# Patient Record
Sex: FEMALE | Race: WHITE | Employment: PART TIME | URBAN - NONMETROPOLITAN AREA
[De-identification: names, ages, dates, MRNs, and addresses within clinical notes are randomized per-mention and may not be internally consistent; named-entity substitution may affect disease eponyms.]

---

## 2019-07-13 ENCOUNTER — HOSPITAL ENCOUNTER (EMERGENCY)
Age: 39
Discharge: HOME | End: 2019-07-13
Payer: COMMERCIAL

## 2019-07-13 VITALS
DIASTOLIC BLOOD PRESSURE: 71 MMHG | TEMPERATURE: 99 F | SYSTOLIC BLOOD PRESSURE: 122 MMHG | HEART RATE: 81 BPM | RESPIRATION RATE: 18 BRPM | OXYGEN SATURATION: 97 %

## 2019-07-13 DIAGNOSIS — T07.XXXA ABRASIONS OF MULTIPLE SITES: Primary | ICD-10-CM

## 2019-07-13 PROCEDURE — 6370000000 HC RX 637 (ALT 250 FOR IP): Performed by: PHYSICIAN ASSISTANT

## 2019-07-13 PROCEDURE — 90715 TDAP VACCINE 7 YRS/> IM: CPT | Performed by: PHYSICIAN ASSISTANT

## 2019-07-13 PROCEDURE — 90471 IMMUNIZATION ADMIN: CPT | Performed by: PHYSICIAN ASSISTANT

## 2019-07-13 PROCEDURE — 99282 EMERGENCY DEPT VISIT SF MDM: CPT

## 2019-07-13 PROCEDURE — 6360000002 HC RX W HCPCS: Performed by: PHYSICIAN ASSISTANT

## 2019-07-13 RX ORDER — BACITRACIN, NEOMYCIN, POLYMYXIN B 400; 3.5; 5 [USP'U]/G; MG/G; [USP'U]/G
OINTMENT TOPICAL ONCE
Status: COMPLETED | OUTPATIENT
Start: 2019-07-13 | End: 2019-07-13

## 2019-07-13 RX ADMIN — TETANUS TOXOID, REDUCED DIPHTHERIA TOXOID AND ACELLULAR PERTUSSIS VACCINE, ADSORBED 0.5 ML: 5; 2.5; 8; 8; 2.5 SUSPENSION INTRAMUSCULAR at 18:55

## 2019-07-13 RX ADMIN — BACITRACIN, NEOMYCIN, POLYMYXIN B 1 G: 400; 3.5; 5 OINTMENT TOPICAL at 18:56

## 2019-07-13 ASSESSMENT — PAIN DESCRIPTION - ORIENTATION: ORIENTATION: RIGHT;LEFT

## 2019-07-13 ASSESSMENT — PAIN DESCRIPTION - PAIN TYPE: TYPE: ACUTE PAIN

## 2019-07-13 ASSESSMENT — PAIN SCALES - GENERAL: PAINLEVEL_OUTOF10: 2

## 2019-07-13 ASSESSMENT — PAIN DESCRIPTION - LOCATION: LOCATION: ARM

## 2019-07-13 ASSESSMENT — PAIN DESCRIPTION - DESCRIPTORS: DESCRIPTORS: BURNING;DISCOMFORT

## 2019-07-13 ASSESSMENT — PAIN DESCRIPTION - FREQUENCY: FREQUENCY: CONTINUOUS

## 2019-07-13 NOTE — ED NOTES
This writer had patient wash scratches with soap and water at the sink.   Pt states that she did not wash them earlier as this happened around noon     Maddy Will, [address] [facility]  07/13/19 7348

## 2019-07-27 ENCOUNTER — HOSPITAL ENCOUNTER (EMERGENCY)
Age: 39
Discharge: HOME | End: 2019-07-27
Payer: COMMERCIAL

## 2019-07-27 ENCOUNTER — APPOINTMENT (OUTPATIENT)
Dept: GENERAL RADIOLOGY | Age: 39
End: 2019-07-27
Payer: COMMERCIAL

## 2019-07-27 VITALS
WEIGHT: 215 LBS | TEMPERATURE: 98.2 F | SYSTOLIC BLOOD PRESSURE: 115 MMHG | DIASTOLIC BLOOD PRESSURE: 75 MMHG | HEART RATE: 62 BPM | BODY MASS INDEX: 33.74 KG/M2 | HEIGHT: 67 IN | RESPIRATION RATE: 20 BRPM | OXYGEN SATURATION: 100 %

## 2019-07-27 DIAGNOSIS — S63.641A SPRAIN OF METACARPOPHALANGEAL (MCP) JOINT OF RIGHT THUMB, INITIAL ENCOUNTER: Primary | ICD-10-CM

## 2019-07-27 PROCEDURE — 73140 X-RAY EXAM OF FINGER(S): CPT

## 2019-07-27 PROCEDURE — 99283 EMERGENCY DEPT VISIT LOW MDM: CPT

## 2019-07-27 RX ORDER — NAPROXEN 500 MG/1
500 TABLET ORAL 2 TIMES DAILY
Qty: 14 TABLET | Refills: 0 | Status: SHIPPED | OUTPATIENT
Start: 2019-07-27 | End: 2020-06-04

## 2019-07-27 SDOH — HEALTH STABILITY: MENTAL HEALTH: HOW OFTEN DO YOU HAVE A DRINK CONTAINING ALCOHOL?: NEVER

## 2019-07-27 ASSESSMENT — PAIN SCALES - GENERAL: PAINLEVEL_OUTOF10: 1

## 2019-07-27 ASSESSMENT — PAIN DESCRIPTION - PAIN TYPE: TYPE: ACUTE PAIN

## 2019-07-27 NOTE — ED PROVIDER NOTES
[facility] ED  EMERGENCY DEPARTMENT ENCOUNTER      Pt Name: Blanca Cortes  MRN: [id number]  [place] 1980  Date of evaluation: 7/27/2019  Provider: Amarjit Lombardi PA-C    CHIEF COMPLAINT       Chief Complaint   Patient presents with    Hand Injury     Right thumb injury at work today. HISTORY OF PRESENT ILLNESS    Blanca Cortes is a 35 y.o. female who presents to the emergency department from home planes of pain in her right thumb after injury at work today. 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 Denies distal or tingling. Triage notes and Nursing notes were reviewed by myself. Any discrepancies are addressed above. PAST MEDICAL HISTORY     Past Medical History:   Diagnosis Date    Acute relapsing multiple sclerosis ([facility] [address]        SURGICAL HISTORY     History reviewed. No pertinent surgical history. CURRENT MEDICATIONS       Discharge Medication List as of 7/27/2019  4:24 PM          ALLERGIES     Patient has no known allergies. FAMILY HISTORY     History reviewed. No pertinent family history.      SOCIAL HISTORY       Social History     Socioeconomic History    Marital status:      Spouse name: None    Number of children: None    Years of education: None    Highest education level: None   Occupational History    None   Social Needs    Financial resource strain: None    Food insecurity:     Worry: None     Inability: None    Transportation needs:     Medical: None     Non-medical: None   Tobacco Use    Smoking status: Never Smoker    Smokeless tobacco: Never Used   Substance and Sexual Activity    Alcohol use: Not Currently     Frequency: Never    Drug use: None    Sexual activity: None   Lifestyle    Physical activity:     Days per week: None     Minutes per session: None    Stress: None

## 2019-10-25 ENCOUNTER — HOSPITAL ENCOUNTER (OUTPATIENT)
Dept: OCCUPATIONAL THERAPY | Age: 39
Setting detail: THERAPIES SERIES
Discharge: HOME | End: 2019-10-25
Payer: COMMERCIAL

## 2019-10-25 PROCEDURE — 97165 OT EVAL LOW COMPLEX 30 MIN: CPT

## 2019-10-25 PROCEDURE — 97018 PARAFFIN BATH THERAPY: CPT

## 2019-10-25 PROCEDURE — G0283 ELEC STIM OTHER THAN WOUND: HCPCS

## 2019-10-25 PROCEDURE — 97110 THERAPEUTIC EXERCISES: CPT

## 2019-10-28 ENCOUNTER — HOSPITAL ENCOUNTER (OUTPATIENT)
Dept: OCCUPATIONAL THERAPY | Age: 39
Setting detail: THERAPIES SERIES
Discharge: HOME | End: 2019-10-28
Payer: COMMERCIAL

## 2019-10-28 PROCEDURE — 97035 APP MDLTY 1+ULTRASOUND EA 15: CPT

## 2019-10-28 PROCEDURE — 97140 MANUAL THERAPY 1/> REGIONS: CPT

## 2019-10-28 PROCEDURE — 97018 PARAFFIN BATH THERAPY: CPT

## 2019-10-30 ENCOUNTER — HOSPITAL ENCOUNTER (OUTPATIENT)
Dept: OCCUPATIONAL THERAPY | Age: 39
Setting detail: THERAPIES SERIES
Discharge: HOME | End: 2019-10-30
Payer: COMMERCIAL

## 2019-10-30 PROCEDURE — 97140 MANUAL THERAPY 1/> REGIONS: CPT

## 2019-10-30 PROCEDURE — 97018 PARAFFIN BATH THERAPY: CPT

## 2019-10-30 PROCEDURE — 97032 APPL MODALITY 1+ESTIM EA 15: CPT

## 2019-11-04 ENCOUNTER — HOSPITAL ENCOUNTER (OUTPATIENT)
Dept: OCCUPATIONAL THERAPY | Age: 39
Setting detail: THERAPIES SERIES
Discharge: HOME | End: 2019-11-04
Payer: COMMERCIAL

## 2019-11-04 PROCEDURE — 97035 APP MDLTY 1+ULTRASOUND EA 15: CPT

## 2019-11-04 PROCEDURE — 97018 PARAFFIN BATH THERAPY: CPT

## 2019-11-04 PROCEDURE — 97140 MANUAL THERAPY 1/> REGIONS: CPT

## 2019-11-06 ENCOUNTER — HOSPITAL ENCOUNTER (OUTPATIENT)
Dept: OCCUPATIONAL THERAPY | Age: 39
Setting detail: THERAPIES SERIES
Discharge: HOME | End: 2019-11-06
Payer: COMMERCIAL

## 2019-11-06 PROCEDURE — 97035 APP MDLTY 1+ULTRASOUND EA 15: CPT

## 2019-11-06 PROCEDURE — 97018 PARAFFIN BATH THERAPY: CPT

## 2019-11-06 PROCEDURE — 97110 THERAPEUTIC EXERCISES: CPT

## 2019-11-13 ENCOUNTER — HOSPITAL ENCOUNTER (OUTPATIENT)
Dept: OCCUPATIONAL THERAPY | Age: 39
Setting detail: THERAPIES SERIES
Discharge: HOME | End: 2019-11-13
Payer: COMMERCIAL

## 2019-11-15 ENCOUNTER — APPOINTMENT (OUTPATIENT)
Dept: OCCUPATIONAL THERAPY | Age: 39
End: 2019-11-15
Payer: COMMERCIAL

## 2019-11-18 ENCOUNTER — APPOINTMENT (OUTPATIENT)
Dept: OCCUPATIONAL THERAPY | Age: 39
End: 2019-11-18
Payer: COMMERCIAL

## 2019-11-20 ENCOUNTER — APPOINTMENT (OUTPATIENT)
Dept: OCCUPATIONAL THERAPY | Age: 39
End: 2019-11-20
Payer: COMMERCIAL

## 2019-12-13 ENCOUNTER — OFFICE VISIT (OUTPATIENT)
Dept: NEUROLOGY | Age: 39
End: 2019-12-13
Payer: COMMERCIAL

## 2019-12-13 VITALS
HEIGHT: 67 IN | BODY MASS INDEX: 35.09 KG/M2 | HEART RATE: 96 BPM | DIASTOLIC BLOOD PRESSURE: 80 MMHG | WEIGHT: 223.6 LBS | SYSTOLIC BLOOD PRESSURE: 116 MMHG

## 2019-12-13 DIAGNOSIS — H53.9 VISUAL DISTURBANCE: ICD-10-CM

## 2019-12-13 DIAGNOSIS — R20.0 RIGHT SIDED NUMBNESS: ICD-10-CM

## 2019-12-13 DIAGNOSIS — G35 MULTIPLE SCLEROSIS (HCC): Primary | ICD-10-CM

## 2019-12-13 PROCEDURE — G8484 FLU IMMUNIZE NO ADMIN: HCPCS | Performed by: PSYCHIATRY & NEUROLOGY

## 2019-12-13 PROCEDURE — G8427 DOCREV CUR MEDS BY ELIG CLIN: HCPCS | Performed by: PSYCHIATRY & NEUROLOGY

## 2019-12-13 PROCEDURE — G8417 CALC BMI ABV UP PARAM F/U: HCPCS | Performed by: PSYCHIATRY & NEUROLOGY

## 2019-12-13 PROCEDURE — 99204 OFFICE O/P NEW MOD 45 MIN: CPT | Performed by: PSYCHIATRY & NEUROLOGY

## 2019-12-13 PROCEDURE — 1036F TOBACCO NON-USER: CPT | Performed by: PSYCHIATRY & NEUROLOGY

## 2019-12-13 RX ORDER — PREDNISONE 20 MG/1
TABLET ORAL
Qty: 18 TABLET | Refills: 0 | Status: SHIPPED | OUTPATIENT
Start: 2019-12-13 | End: 2019-12-23

## 2019-12-13 RX ORDER — METHYLPREDNISOLONE SODIUM SUCCINATE 500 MG/8ML
INJECTION INTRAMUSCULAR; INTRAVENOUS
Qty: 1000 MG | Refills: 0 | Status: SHIPPED | OUTPATIENT
Start: 2019-12-13 | End: 2020-06-04

## 2019-12-13 ASSESSMENT — ENCOUNTER SYMPTOMS
GASTROINTESTINAL NEGATIVE: 1
ALLERGIC/IMMUNOLOGIC NEGATIVE: 1
EYES NEGATIVE: 1

## 2019-12-16 ENCOUNTER — HOSPITAL ENCOUNTER (OUTPATIENT)
Dept: INFUSION THERAPY | Age: 39
End: 2019-12-16
Payer: COMMERCIAL

## 2019-12-16 ENCOUNTER — TELEPHONE (OUTPATIENT)
Dept: NEUROLOGY | Age: 39
End: 2019-12-16

## 2019-12-16 DIAGNOSIS — G35 MULTIPLE SCLEROSIS (HCC): ICD-10-CM

## 2019-12-16 RX ORDER — SODIUM CHLORIDE 0.9 % (FLUSH) 0.9 %
10 SYRINGE (ML) INJECTION PRN
Status: CANCELLED | OUTPATIENT
Start: 2019-12-16

## 2019-12-16 RX ORDER — SODIUM CHLORIDE 0.9 % (FLUSH) 0.9 %
5 SYRINGE (ML) INJECTION PRN
Status: CANCELLED | OUTPATIENT
Start: 2019-12-16

## 2019-12-16 RX ORDER — SODIUM CHLORIDE 9 MG/ML
INJECTION, SOLUTION INTRAVENOUS CONTINUOUS
Status: CANCELLED
Start: 2019-12-16

## 2019-12-17 ENCOUNTER — TELEPHONE (OUTPATIENT)
Dept: NEUROLOGY | Age: 39
End: 2019-12-17

## 2019-12-17 ENCOUNTER — HOSPITAL ENCOUNTER (OUTPATIENT)
Dept: INFUSION THERAPY | Age: 39
End: 2019-12-17

## 2019-12-18 ENCOUNTER — HOSPITAL ENCOUNTER (OUTPATIENT)
Dept: INFUSION THERAPY | Age: 39
Discharge: HOME | End: 2019-12-18
Payer: COMMERCIAL

## 2019-12-18 VITALS
SYSTOLIC BLOOD PRESSURE: 109 MMHG | TEMPERATURE: 97.2 F | HEART RATE: 73 BPM | RESPIRATION RATE: 18 BRPM | DIASTOLIC BLOOD PRESSURE: 70 MMHG

## 2019-12-18 DIAGNOSIS — G35 MULTIPLE SCLEROSIS (HCC): Primary | ICD-10-CM

## 2019-12-18 PROCEDURE — 96365 THER/PROPH/DIAG IV INF INIT: CPT

## 2019-12-18 PROCEDURE — 2580000003 HC RX 258: Performed by: PSYCHIATRY & NEUROLOGY

## 2019-12-18 PROCEDURE — 6360000002 HC RX W HCPCS: Performed by: PSYCHIATRY & NEUROLOGY

## 2019-12-18 RX ORDER — SODIUM CHLORIDE 9 MG/ML
INJECTION, SOLUTION INTRAVENOUS CONTINUOUS
Status: DISCONTINUED | OUTPATIENT
Start: 2019-12-18 | End: 2019-12-19 | Stop reason: HOSPADM

## 2019-12-18 RX ORDER — SODIUM CHLORIDE 9 MG/ML
INJECTION, SOLUTION INTRAVENOUS CONTINUOUS
Status: CANCELLED
Start: 2019-12-19

## 2019-12-18 RX ORDER — SODIUM CHLORIDE 0.9 % (FLUSH) 0.9 %
10 SYRINGE (ML) INJECTION PRN
Status: DISCONTINUED | OUTPATIENT
Start: 2019-12-18 | End: 2019-12-19 | Stop reason: HOSPADM

## 2019-12-18 RX ORDER — SODIUM CHLORIDE 0.9 % (FLUSH) 0.9 %
5 SYRINGE (ML) INJECTION PRN
Status: CANCELLED | OUTPATIENT
Start: 2019-12-19

## 2019-12-18 RX ORDER — SODIUM CHLORIDE 0.9 % (FLUSH) 0.9 %
10 SYRINGE (ML) INJECTION PRN
Status: CANCELLED | OUTPATIENT
Start: 2019-12-19

## 2019-12-18 RX ORDER — ACETAMINOPHEN 500 MG
1000 TABLET ORAL EVERY 6 HOURS PRN
COMMUNITY

## 2019-12-18 RX ADMIN — Medication 10 ML: at 14:05

## 2019-12-18 RX ADMIN — Medication 10 ML: at 12:45

## 2019-12-18 RX ADMIN — METHYLPREDNISOLONE SODIUM SUCCINATE 1000 MG: 1 INJECTION, POWDER, LYOPHILIZED, FOR SOLUTION INTRAMUSCULAR; INTRAVENOUS at 12:47

## 2019-12-18 RX ADMIN — SODIUM CHLORIDE: 9 INJECTION, SOLUTION INTRAVENOUS at 12:45

## 2019-12-18 ASSESSMENT — PAIN DESCRIPTION - DESCRIPTORS: DESCRIPTORS: ACHING

## 2019-12-18 ASSESSMENT — PAIN SCALES - GENERAL: PAINLEVEL_OUTOF10: 3

## 2019-12-18 ASSESSMENT — PAIN DESCRIPTION - LOCATION: LOCATION: HEAD

## 2019-12-19 ENCOUNTER — HOSPITAL ENCOUNTER (OUTPATIENT)
Dept: INFUSION THERAPY | Age: 39
Discharge: HOME | End: 2019-12-19
Payer: COMMERCIAL

## 2019-12-19 VITALS
HEART RATE: 71 BPM | TEMPERATURE: 98.9 F | DIASTOLIC BLOOD PRESSURE: 60 MMHG | RESPIRATION RATE: 16 BRPM | SYSTOLIC BLOOD PRESSURE: 104 MMHG

## 2019-12-19 DIAGNOSIS — G35 MULTIPLE SCLEROSIS (HCC): Primary | ICD-10-CM

## 2019-12-19 PROCEDURE — 96365 THER/PROPH/DIAG IV INF INIT: CPT

## 2019-12-19 PROCEDURE — 2580000003 HC RX 258: Performed by: PSYCHIATRY & NEUROLOGY

## 2019-12-19 PROCEDURE — 6360000002 HC RX W HCPCS: Performed by: PSYCHIATRY & NEUROLOGY

## 2019-12-19 RX ORDER — SODIUM CHLORIDE 9 MG/ML
INJECTION, SOLUTION INTRAVENOUS CONTINUOUS
Status: DISCONTINUED | OUTPATIENT
Start: 2019-12-19 | End: 2019-12-20 | Stop reason: HOSPADM

## 2019-12-19 RX ORDER — SODIUM CHLORIDE 0.9 % (FLUSH) 0.9 %
10 SYRINGE (ML) INJECTION PRN
Status: CANCELLED | OUTPATIENT
Start: 2019-12-20

## 2019-12-19 RX ORDER — SODIUM CHLORIDE 9 MG/ML
INJECTION, SOLUTION INTRAVENOUS CONTINUOUS
Status: CANCELLED
Start: 2019-12-20

## 2019-12-19 RX ORDER — SODIUM CHLORIDE 0.9 % (FLUSH) 0.9 %
5 SYRINGE (ML) INJECTION PRN
Status: CANCELLED | OUTPATIENT
Start: 2019-12-20

## 2019-12-19 RX ORDER — SODIUM CHLORIDE 0.9 % (FLUSH) 0.9 %
10 SYRINGE (ML) INJECTION PRN
Status: DISCONTINUED | OUTPATIENT
Start: 2019-12-19 | End: 2019-12-20 | Stop reason: HOSPADM

## 2019-12-19 RX ADMIN — Medication 10 ML: at 14:12

## 2019-12-19 RX ADMIN — SODIUM CHLORIDE 1000 MG: 9 INJECTION, SOLUTION INTRAVENOUS at 12:57

## 2019-12-19 RX ADMIN — SODIUM CHLORIDE: 9 INJECTION, SOLUTION INTRAVENOUS at 12:53

## 2019-12-19 RX ADMIN — Medication 10 ML: at 12:35

## 2019-12-19 NOTE — PROGRESS NOTES
1410 Verbally reviewed discharge instructions for care and follow up. Previous print out of these instructions were given with prior treatment. Patient verbalized understanding of these instructions.

## 2019-12-20 ENCOUNTER — HOSPITAL ENCOUNTER (OUTPATIENT)
Dept: INFUSION THERAPY | Age: 39
Discharge: HOME | End: 2019-12-20
Payer: COMMERCIAL

## 2019-12-20 VITALS
HEART RATE: 60 BPM | SYSTOLIC BLOOD PRESSURE: 126 MMHG | RESPIRATION RATE: 20 BRPM | DIASTOLIC BLOOD PRESSURE: 80 MMHG | TEMPERATURE: 97.9 F

## 2019-12-20 DIAGNOSIS — G35 MULTIPLE SCLEROSIS (HCC): Primary | ICD-10-CM

## 2019-12-20 PROCEDURE — 6360000002 HC RX W HCPCS: Performed by: PSYCHIATRY & NEUROLOGY

## 2019-12-20 PROCEDURE — 96365 THER/PROPH/DIAG IV INF INIT: CPT

## 2019-12-20 PROCEDURE — 2580000003 HC RX 258: Performed by: PSYCHIATRY & NEUROLOGY

## 2019-12-20 RX ORDER — SODIUM CHLORIDE 9 MG/ML
INJECTION, SOLUTION INTRAVENOUS CONTINUOUS
Status: CANCELLED
Start: 2019-12-20

## 2019-12-20 RX ORDER — SODIUM CHLORIDE 0.9 % (FLUSH) 0.9 %
10 SYRINGE (ML) INJECTION PRN
Status: DISCONTINUED | OUTPATIENT
Start: 2019-12-20 | End: 2019-12-21 | Stop reason: HOSPADM

## 2019-12-20 RX ORDER — SODIUM CHLORIDE 9 MG/ML
INJECTION, SOLUTION INTRAVENOUS CONTINUOUS
Status: DISCONTINUED | OUTPATIENT
Start: 2019-12-20 | End: 2019-12-21 | Stop reason: HOSPADM

## 2019-12-20 RX ORDER — SODIUM CHLORIDE 0.9 % (FLUSH) 0.9 %
5 SYRINGE (ML) INJECTION PRN
Status: CANCELLED | OUTPATIENT
Start: 2019-12-20

## 2019-12-20 RX ORDER — SODIUM CHLORIDE 0.9 % (FLUSH) 0.9 %
10 SYRINGE (ML) INJECTION PRN
Status: CANCELLED | OUTPATIENT
Start: 2019-12-20

## 2019-12-20 RX ADMIN — SODIUM CHLORIDE: 9 INJECTION, SOLUTION INTRAVENOUS at 12:20

## 2019-12-20 RX ADMIN — Medication 10 ML: at 12:18

## 2019-12-20 RX ADMIN — SODIUM CHLORIDE 1000 MG: 9 INJECTION, SOLUTION INTRAVENOUS at 12:37

## 2020-01-24 ENCOUNTER — OFFICE VISIT (OUTPATIENT)
Dept: NEUROLOGY | Age: 40
End: 2020-01-24
Payer: COMMERCIAL

## 2020-01-24 VITALS
DIASTOLIC BLOOD PRESSURE: 92 MMHG | BODY MASS INDEX: 35.31 KG/M2 | HEART RATE: 104 BPM | WEIGHT: 225 LBS | HEIGHT: 67 IN | SYSTOLIC BLOOD PRESSURE: 128 MMHG

## 2020-01-24 PROCEDURE — G8417 CALC BMI ABV UP PARAM F/U: HCPCS | Performed by: PSYCHIATRY & NEUROLOGY

## 2020-01-24 PROCEDURE — 1036F TOBACCO NON-USER: CPT | Performed by: PSYCHIATRY & NEUROLOGY

## 2020-01-24 PROCEDURE — 99214 OFFICE O/P EST MOD 30 MIN: CPT | Performed by: PSYCHIATRY & NEUROLOGY

## 2020-01-24 PROCEDURE — G8484 FLU IMMUNIZE NO ADMIN: HCPCS | Performed by: PSYCHIATRY & NEUROLOGY

## 2020-01-24 PROCEDURE — G8427 DOCREV CUR MEDS BY ELIG CLIN: HCPCS | Performed by: PSYCHIATRY & NEUROLOGY

## 2020-01-24 RX ORDER — IBUPROFEN 200 MG
200 TABLET ORAL EVERY 6 HOURS PRN
COMMUNITY

## 2020-01-24 ASSESSMENT — ENCOUNTER SYMPTOMS
ALLERGIC/IMMUNOLOGIC NEGATIVE: 1
GASTROINTESTINAL NEGATIVE: 1
EYES NEGATIVE: 1
BACK PAIN: 1

## 2020-01-24 NOTE — PROGRESS NOTES
Spouse name: None    Number of children: None    Years of education: None    Highest education level: None   Occupational History    None   Social Needs    Financial resource strain: None    Food insecurity:     Worry: None     Inability: None    Transportation needs:     Medical: None     Non-medical: None   Tobacco Use    Smoking status: Former Smoker     Years: 15.00     Types: Cigarettes     Last attempt to quit: 2011     Years since quittin.1    Smokeless tobacco: Never Used   Substance and Sexual Activity    Alcohol use: Yes     Frequency: Never     Comment: occasionally    Drug use: Never    Sexual activity: None   Lifestyle    Physical activity:     Days per week: None     Minutes per session: None    Stress: None   Relationships    Social connections:     Talks on phone: None     Gets together: None     Attends Spiritism service: None     Active member of club or organization: None     Attends meetings of clubs or organizations: None     Relationship status: None    Intimate partner violence:     Fear of current or ex partner: None     Emotionally abused: None     Physically abused: None     Forced sexual activity: None   Other Topics Concern    None   Social History Narrative    None       Current Outpatient Medications   Medication Sig Dispense Refill    ibuprofen (ADVIL;MOTRIN) 200 MG tablet Take 200 mg by mouth every 6 hours as needed for Pain      acetaminophen (TYLENOL) 500 MG tablet Take 1,000 mg by mouth every 6 hours as needed for Pain      VITAMIN D PO Take 10,000 Units by mouth daily      Meloxicam (MOBIC PO) Take 25 mg by mouth daily      methylPREDNISolone sodium (SOLU-MEDROL) 500 MG injection 1 gram IVPB qd x 3 days (Patient not taking: Reported on 2020) 1000 mg 0    naproxen (NAPROSYN) 500 MG tablet Take 1 tablet by mouth 2 times daily (Patient not taking: Reported on 2019) 14 tablet 0     No current facility-administered medications for this Orders   1. Multiple sclerosis ([facility] [address]  MRI Brain W WO Contrast    MRI Cervical Spine W WO Contrast    Vitamin B12    Sedimentation rate, automated    Folate    TSH without Reflex    MASON Preston MD, Neurology, [place]   2. Right sided numbness  MRI Brain W WO Contrast    MRI Cervical Spine W WO Contrast   3. Visual disturbance  MRI Brain W WO Contrast    MRI Cervical Spine W WO Contrast   Patient has had improvement of right side numbness with IV solumedrol therapy.  Will make referral to [address] [facility] run by Dr Joanna Mcghee to undergo MRI of Head and cervical spine and bloodwork        Plan:      Orders Placed This Encounter   Procedures    MRI Brain W WO Contrast     Standing Status:   Future     Standing Expiration Date:   1/23/2021    MRI Cervical Spine W WO Contrast     Standing Status:   Future     Standing Expiration Date:   1/23/2021    Vitamin B12     Standing Status:   Future     Standing Expiration Date:   1/23/2021    Sedimentation rate, automated     Standing Status:   Future     Standing Expiration Date:   1/23/2021    Folate     Standing Status:   Future     Standing Expiration Date:   1/23/2021    TSH without Reflex     Standing Status:   Future     Standing Expiration Date:   1/23/2021    MASON     Standing Status:   Future     Standing Expiration Date:   1/23/2021   Neena Preston MD, Neurology, [place]     Referral Priority:   Routine     Referral Type:   Eval and Treat     Referral Reason:   Specialty Services Required     Referred to Provider:   Enid Roca MD     Requested Specialty:   Neurology     Number of Visits Requested:   1           Ruddy Dahl MD

## 2020-01-26 NOTE — COMMUNICATION BODY
Subjective:      Patient ID: Suzie Sams is a 38 y.o. female. HPI    Active problem  multiple sclerosis on no immunomodulating medication with excacerbation with left eye blurriness and right hemisensory loss to undergo MRI of Head and cervical spine , bloodwork along with proceed with IV solumedrol pulse therapy followed by prednisone taper . The condition is she reports there has been improvement of blurriness of left eye with mild residual . There has been also improvement in right side numbness with no more right arm numbness although has residual numbness from right trunk down right leg . There is no weakness or imbalance or bladder complaints . She did not have MRI studies or bloodwork . Patient has multiple sclerosis for the past 21 years recently moving to [place] from [facility] . MS presented with right hemisensory loss with mild weakness and dizziness diagnosed by MRI of not having solumedrol pulse therapy started on betaseron. She reports several exacerbations over 6 months manifesting as numbness staying on betaseron for 2 years until she became pregnant . 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 She was placed on avonex unable to tolerate do to flu symptoms changed to copaxone taking this for 3 to 4 months stopping this do to loss of insurance . She reports at that time 3 to 4 episodes of numbness at that time lasting from 1 to 4 weeks . She has been off since then immunodulating medication . Past Medical History:   Diagnosis Date    Acute relapsing multiple sclerosis ([facility] [address]        Past Surgical History:   Procedure Laterality Date     SECTION      times 3    KNEE SURGERY Left     cyst removal       History reviewed. No pertinent family history.     Social History     Socioeconomic History    Marital status:

## 2020-01-30 ENCOUNTER — HOSPITAL ENCOUNTER (OUTPATIENT)
Dept: MRI IMAGING | Age: 40
Discharge: HOME | End: 2020-02-01
Payer: COMMERCIAL

## 2020-01-30 ENCOUNTER — HOSPITAL ENCOUNTER (OUTPATIENT)
Dept: LAB | Age: 40
Discharge: HOME | End: 2020-01-30
Payer: COMMERCIAL

## 2020-01-30 LAB
SEDIMENTATION RATE, ERYTHROCYTE: 12 MM (ref 0–20)
TSH SERPL DL<=0.05 MIU/L-ACNC: 1.12 MIU/L (ref 0.3–5)

## 2020-01-30 PROCEDURE — 82746 ASSAY OF FOLIC ACID SERUM: CPT

## 2020-01-30 PROCEDURE — 36415 COLL VENOUS BLD VENIPUNCTURE: CPT

## 2020-01-30 PROCEDURE — A9579 GAD-BASE MR CONTRAST NOS,1ML: HCPCS | Performed by: PSYCHIATRY & NEUROLOGY

## 2020-01-30 PROCEDURE — 70553 MRI BRAIN STEM W/O & W/DYE: CPT

## 2020-01-30 PROCEDURE — 6360000004 HC RX CONTRAST MEDICATION: Performed by: PSYCHIATRY & NEUROLOGY

## 2020-01-30 PROCEDURE — 84443 ASSAY THYROID STIM HORMONE: CPT

## 2020-01-30 PROCEDURE — 86038 ANTINUCLEAR ANTIBODIES: CPT

## 2020-01-30 PROCEDURE — 85651 RBC SED RATE NONAUTOMATED: CPT

## 2020-01-30 PROCEDURE — 82607 VITAMIN B-12: CPT

## 2020-01-30 PROCEDURE — 72156 MRI NECK SPINE W/O & W/DYE: CPT

## 2020-01-30 RX ADMIN — GADOTERIDOL 19 ML: 279.3 INJECTION, SOLUTION INTRAVENOUS at 15:02

## 2020-01-31 LAB
ANTI-NUCLEAR ANTIBODY (ANA): NEGATIVE
FOLATE: 11 NG/ML
VITAMIN B-12: 625 PG/ML (ref 232–1245)

## 2020-02-10 NOTE — TELEPHONE ENCOUNTER
Kong Snyder called the office and left a message on my voicemail. She states that she has had nonstop headaches for the past few months and she is wondering if the MRI's showed anything. This was discussed with Dr. Stephania Basilio. He asked that Karissa Welsh know that her MRI brain looked the same, showing no difference. MRI cervical spine did show some arthritic changes but he felt this is of no consequence. Lab work looks good. He is suggesting a prednisone taper as well as starting on Amitriptyline 25 mg q hs. Call placed to Kong Snyder this morning and this information was given. Patient is agreeable to try medication. Pharmacy was confirmed. I asked that she call if there were any problems.

## 2020-02-11 RX ORDER — AMITRIPTYLINE HYDROCHLORIDE 25 MG/1
25 TABLET, FILM COATED ORAL NIGHTLY
Qty: 30 TABLET | Refills: 1 | Status: SHIPPED | OUTPATIENT
Start: 2020-02-11 | End: 2020-03-31

## 2020-02-11 RX ORDER — PREDNISONE 20 MG/1
TABLET ORAL
Qty: 18 TABLET | Refills: 0 | Status: SHIPPED | OUTPATIENT
Start: 2020-02-11 | End: 2020-06-04

## 2020-03-30 RX ORDER — CETIRIZINE HYDROCHLORIDE 10 MG/1
10 TABLET ORAL DAILY
COMMUNITY
End: 2020-08-06

## 2020-03-30 RX ORDER — BENZONATATE 100 MG/1
100 CAPSULE ORAL 3 TIMES DAILY PRN
COMMUNITY
End: 2020-06-04

## 2020-03-30 NOTE — PROGRESS NOTES
[facility]  Offices: [address], [facility], [address] [facility]  [address], [address], [address] [facility]  [address] [place] [address] [place], [address]  Phone: [phone]  Fax: [phone]    MD Jose Stafford MD Ahmed B. Ottie Oyster, MD Norvell Finely, MD Marsa Bushman, MD Harrison Roots, CNP    TELEHEALTH VISIT        3/30/2020      HISTORY OF PRESENT ILLNESS:       I had the pleasure of seeing Kevin Noyola, who returns for continuing neurologic care for relapsing remitting multiple sclerosis. This patient was previously seen by Dr. Radha Ocasio, I reviewed his prior notes in detail. DISEASE SUMMARY  Date of onset: 1998 (R sensory loss and dizziness)  Date of diagnosis of MS: 1998  Disease course at onset: Relapsing-Remitting  Current disease course: Relapsing-Remitting  Previous disease therapies: IVMP, Betaseron ( [id number], pregnancy), Avonex ( 2009, took for 3-4 mos, flu-like symptoms)  Current disease therapy: none  CSF: N/A  JCV serology result and date:  Vitamin D:  Smoking status:    Patient reports she was diagnosed in [address] [place] [address] after presenting with R hemisensory loss. She underwent an MRI which reportedly confimed a diagnosis of MS, did not have an LP. She was started on Betaseron but stopped it after getting pregnant. She was symptom free until 2009, and was not on any DMT until she experienced complete loss of vision on the R eye. She was diagnosed with ON, and treated with IV steroids, with partial resolution of symptoms. She was then started on Avonex but only took it for 3-4 mos due to severe flu-like symptoms. She never resumed any DMT and was relapse free until Dec 2019 when she experienced blurring of vision in both eyes and recurrence of R sided numbness in her leg. She then saw Dr. Radha Ocasoi, MRI brain and C spine showed nurmerous supratentorial white matter lesions with T1 black holes, no enhancement.  No prior imaging symmetry                                                             VIII - intact hearing                                                                             IX, X - symmetrical palate                                                                  XI - symmetrical shoulder shrug                                                       XII - tongue midline without atrophy      Motor function  Normal muscle bulk. Strength at least 4+/5 on all 4 extremities, no pronator drift      Sensory function Unable to test      Cerebellar Intact fine motor movement. No involuntary movements or tremors. No ataxia or dysmetria on finger to nose or heel to shin testing      Reflex function Unable to test      Gait                   normal base and arm swing              ASSESSMENT AND PLAN:       This is a 38 y.o. female who comes in for follow up for a history of RRMS that was diagnosed in 1998. She was on platforms therapies in the past, has not been on any DMT since 2009. She presented in December with bilateral BOV and R leg numbness, recent MRI brain and C spine showed no enhancement, no prior imaging available for comparison (patient recently moved her from [place]). 1. Discussed risks and benefit of restarting DMT with the patient. I think one of the orals would be a good fit, possibly Mayzent or Vumerity. Will mail out patient info packets for these, as well as AUbagio, for patient to review  2. Will check CBC, CMP, HIV, vit D, etc and other baseline screening labs today  3. She will also need an MRI of the T spine at baseline  4. Will stop amitriptyline as it has not helped with her headaches, and made her fatigue worse  5. Follow up in 2 weeks    At least 25 minutes was spent in patient care, care coordination, counseling and review of records for this patient. This is a telehealth visit that was performed with the originating site at Patient Location: home and Provider Location of [place], [place].  Verbal consent to participate in video visit was obtained. Pursuant to the emergency declaration under the [facility]note 2[facility], [facility] waiver authority and the [employer] Act, this Virtual Visit was conducted, with patient's consent, to reduce the patient's risk of exposure to COVID-19 and provide continuity of care for an established/new patient. Services were provided through a video synchronous discussion virtually to substitute for in-person clinic visit. I discussed with the patient the nature of our telehealth visits via interactive/real-time audio/video that:  - I would evaluate the patient and recommend diagnostics and treatments based on my assessment  - Our sessions are not being recorded and that personal health information is protected  - Our team would provide follow up care in person if/when the patient needs it. Marcos Currie MD  Neurology and Sleep Medicine  [facility].    Please note that this chart was generated using voice recognition Dragon dictation software. Although every effort was made to ensure the accuracy of this automated transcription, some errors in transcription may have occurred.

## 2020-03-31 ENCOUNTER — TELEMEDICINE (OUTPATIENT)
Dept: NEUROLOGY | Age: 40
End: 2020-03-31
Payer: COMMERCIAL

## 2020-03-31 PROCEDURE — G8427 DOCREV CUR MEDS BY ELIG CLIN: HCPCS | Performed by: PSYCHIATRY & NEUROLOGY

## 2020-03-31 PROCEDURE — 99214 OFFICE O/P EST MOD 30 MIN: CPT | Performed by: PSYCHIATRY & NEUROLOGY

## 2020-04-07 ENCOUNTER — TELEPHONE (OUTPATIENT)
Dept: NEUROLOGY | Age: 40
End: 2020-04-07

## 2020-04-22 RX ORDER — AZELASTINE 1 MG/ML
SPRAY, METERED NASAL
COMMUNITY
Start: 2020-03-31 | End: 2020-06-04

## 2020-04-22 RX ORDER — AMITRIPTYLINE HYDROCHLORIDE 10 MG/1
TABLET, FILM COATED ORAL
COMMUNITY
Start: 2020-03-06 | End: 2020-08-06

## 2020-04-22 NOTE — PROGRESS NOTES
on 1/24/2020) 1000 mg 0    naproxen (NAPROSYN) 500 MG tablet Take 1 tablet by mouth 2 times daily (Patient not taking: Reported on 3/30/2020) 14 tablet 0     No current facility-administered medications for this visit. ALLERGIES:   No Known Allergies                          REVIEW OF SYSTEMS  10 point Review of Systems was performed and was negative other than for those mentioned above. PHYSICAL EXAMINATION:                                         .                                                                                                    General Appearance:  Alert, cooperative, no signs of distress, appears stated age   Head:  Normocephalic, no signs of trauma   Eyes:  Conjunctiva/corneas clear;  eyelids intact   Ears:  Normal external ear and canals   Nose: Nares normal, no drainage    Throat: Lips and tongue normal; teeth normal;  gums normal   Extremities: Extremities normal, no cyanosis, no edema   Skin: Skin color, texture normal, no rashes, no lesions                                     NEUROLOGIC EXAMINATION      Mental status    Alert and oriented x 3; able to follow commands, speech and language intact; no hallucinations or delusions  Fund of information appropriate for level of education    Cranial nerves    II - grossly intact  III, IV, VI - extra-ocular muscles full: no nystagmus, no ptosis   V - normal facial sensation                                                               VII - normal facial symmetry                                                             VIII - intact hearing                                                                             IX, X - symmetrical palate                                                                  XI - symmetrical shoulder shrug                                                       XII - tongue midline without atrophy      Motor function  Normal muscle bulk.  Strength at least 4+/5 on all 4 extremities, no pronator drift Sensory function Unable to test      Cerebellar Intact fine motor movement. No involuntary movements or tremors. No ataxia or dysmetria on finger to nose or heel to shin testing      Reflex function Unable to test      Gait                   normal base and arm swing              ASSESSMENT AND PLAN:       This is a 38 y.o. female who comes in for follow up for a history of RRMS that was diagnosed in 1998. She was on platforms therapies in the past, has not been on any DMT since 2009. She presented in December with bilateral BOV and R leg numbness, recent MRI brain and C spine showed no enhancement. 1.  We will schedule her screening blood work and EKG at [facility] so she can start Mayzent  2. MRI T-spine currently pending, not being scheduled to the [facility] currently  3. Continue amitriptyline 25 mg at bedtime for now. Advised patient she can try going off it again once her ear infection resolves. 4.  Advised patient that maintenance. Increased risk of infection, will we will follow her lymphocyte counts very closely. Also advised her about the importance of social distancing, frequent handwashing and wearing a mask out of pocket. She expressed understanding. 5.  Follow-up in 10 to 12 weeks. Patient ID verified by me prior to start of this visit    This is a telehealth visit that was performed with the originating site at Patient Location: home and Provider Location of [facility]. Verbal consent to participate in video visit was obtained. Pursuant to the emergency declaration under the [facility] waiver authority and the [employer] and [employer] Act, this Virtual Visit was conducted, with patient's consent, to reduce the patient's risk of exposure to COVID-19 and provide continuity of care for an established/new patient.  Services were provided through a video synchronous discussion virtually to substitute for in-person clinic visit. I discussed with the patient the nature of our telehealth visits via interactive/real-time audio/video that:  - I would evaluate the patient and recommend diagnostics and treatments based on my assessment  - Our sessions are not being recorded and that personal health information is protected  - Our team would provide follow up care in person if/when the patient needs it. Becky Avila MD  Neurology and Sleep Medicine  [facility]    Please note that this chart was generated using voice recognition Dragon dictation software. Although every effort was made to ensure the accuracy of this automated transcription, some errors in transcription may have occurred.

## 2020-04-23 ENCOUNTER — TELEMEDICINE (OUTPATIENT)
Dept: NEUROLOGY | Age: 40
End: 2020-04-23
Payer: COMMERCIAL

## 2020-04-23 ENCOUNTER — TELEPHONE (OUTPATIENT)
Dept: NEUROLOGY | Age: 40
End: 2020-04-23

## 2020-04-23 PROCEDURE — 99214 OFFICE O/P EST MOD 30 MIN: CPT | Performed by: PSYCHIATRY & NEUROLOGY

## 2020-04-23 PROCEDURE — G8427 DOCREV CUR MEDS BY ELIG CLIN: HCPCS | Performed by: PSYCHIATRY & NEUROLOGY

## 2020-04-23 NOTE — TELEPHONE ENCOUNTER
Lisa Watson: needs labs and ekg done at [place], and [facility] start form filled out. Patients email needs to be on the form so they can do electronic consent. Her email is [email or web address]. This note was taken from a Skype message from Dr. Duncan Blow after a VV with patient Libby Ramirez on 4/23/2020.

## 2020-04-27 NOTE — TELEPHONE ENCOUNTER
Can you update me if Mayzent start form has been sent?  Thanks You can access the FollowMyHealth Patient Portal offered by [facility] by registering at the following website: [email or web address]. By joining [employer]’s FollowMyHealth portal, you will also be able to view your health information using other applications (apps) compatible with our system.

## 2020-04-29 NOTE — TELEPHONE ENCOUNTER
Dr. Anuel Choi start form is ready, it does need to be signed by you. Are you able to stop by to do this?

## 2020-06-04 ENCOUNTER — HOSPITAL ENCOUNTER (EMERGENCY)
Age: 40
Discharge: HOME | End: 2020-06-04
Attending: EMERGENCY MEDICINE
Payer: COMMERCIAL

## 2020-06-04 VITALS
OXYGEN SATURATION: 100 % | TEMPERATURE: 98 F | HEART RATE: 64 BPM | SYSTOLIC BLOOD PRESSURE: 113 MMHG | RESPIRATION RATE: 16 BRPM | DIASTOLIC BLOOD PRESSURE: 65 MMHG

## 2020-06-04 LAB
HBV SURFACE AB TITR SER: <3.5 MIU/ML
HEPATITIS B SURFACE ANTIGEN: NONREACTIVE
HEPATITIS C ANTIBODY: NONREACTIVE
HIV AG/AB: NONREACTIVE

## 2020-06-04 PROCEDURE — 86803 HEPATITIS C AB TEST: CPT

## 2020-06-04 PROCEDURE — 86317 IMMUNOASSAY INFECTIOUS AGENT: CPT

## 2020-06-04 PROCEDURE — 90471 IMMUNIZATION ADMIN: CPT | Performed by: EMERGENCY MEDICINE

## 2020-06-04 PROCEDURE — 99283 EMERGENCY DEPT VISIT LOW MDM: CPT

## 2020-06-04 PROCEDURE — 90715 TDAP VACCINE 7 YRS/> IM: CPT | Performed by: EMERGENCY MEDICINE

## 2020-06-04 PROCEDURE — 87340 HEPATITIS B SURFACE AG IA: CPT

## 2020-06-04 PROCEDURE — 87389 HIV-1 AG W/HIV-1&-2 AB AG IA: CPT

## 2020-06-04 PROCEDURE — 6360000002 HC RX W HCPCS: Performed by: EMERGENCY MEDICINE

## 2020-06-04 PROCEDURE — 6370000000 HC RX 637 (ALT 250 FOR IP): Performed by: EMERGENCY MEDICINE

## 2020-06-04 PROCEDURE — 36415 COLL VENOUS BLD VENIPUNCTURE: CPT

## 2020-06-04 RX ORDER — ACETAMINOPHEN 500 MG
1000 TABLET ORAL ONCE
Status: COMPLETED | OUTPATIENT
Start: 2020-06-04 | End: 2020-06-04

## 2020-06-04 RX ORDER — NAPROXEN 375 MG/1
375 TABLET ORAL 2 TIMES DAILY WITH MEALS
Qty: 30 TABLET | Refills: 0 | Status: SHIPPED | OUTPATIENT
Start: 2020-06-04 | End: 2020-08-06

## 2020-06-04 RX ORDER — CYCLOBENZAPRINE HCL 10 MG
10 TABLET ORAL NIGHTLY PRN
Qty: 10 TABLET | Refills: 0 | Status: SHIPPED | OUTPATIENT
Start: 2020-06-04 | End: 2020-06-14

## 2020-06-04 RX ADMIN — TETANUS TOXOID, REDUCED DIPHTHERIA TOXOID AND ACELLULAR PERTUSSIS VACCINE, ADSORBED 0.5 ML: 5; 2.5; 8; 8; 2.5 SUSPENSION INTRAMUSCULAR at 16:50

## 2020-06-04 RX ADMIN — ACETAMINOPHEN 1000 MG: 500 TABLET, FILM COATED ORAL at 16:51

## 2020-06-04 ASSESSMENT — PAIN DESCRIPTION - PROGRESSION: CLINICAL_PROGRESSION: GRADUALLY WORSENING

## 2020-06-04 ASSESSMENT — PAIN SCALES - GENERAL
PAINLEVEL_OUTOF10: 2
PAINLEVEL_OUTOF10: 5
PAINLEVEL_OUTOF10: 4

## 2020-06-04 ASSESSMENT — PAIN DESCRIPTION - LOCATION: LOCATION: HEAD

## 2020-06-04 ASSESSMENT — PAIN DESCRIPTION - ORIENTATION: ORIENTATION: RIGHT

## 2020-06-04 ASSESSMENT — PAIN DESCRIPTION - PAIN TYPE: TYPE: ACUTE PAIN

## 2020-06-04 ASSESSMENT — PAIN DESCRIPTION - ONSET: ONSET: GRADUAL

## 2020-06-04 ASSESSMENT — PAIN DESCRIPTION - FREQUENCY: FREQUENCY: CONTINUOUS

## 2020-06-04 ASSESSMENT — PAIN DESCRIPTION - DESCRIPTORS: DESCRIPTORS: THROBBING
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
## 2020-06-16 ENCOUNTER — TELEPHONE (OUTPATIENT)
Dept: NEUROLOGY | Age: 40
End: 2020-06-16

## 2020-06-16 NOTE — TELEPHONE ENCOUNTER
Dr. Bernadine Arora has recommended pt to go on [address] [facility] for her MS.  the pt. was given orders for EKG and blood work and the start form was faxed in [place], [place]. Pt has not had the labs done as of yet or the EKG. Thanh needs a PA done for her insurance.

## 2020-06-22 ENCOUNTER — TELEPHONE (OUTPATIENT)
Dept: NEUROLOGY | Age: 40
End: 2020-06-22

## 2020-07-07 ENCOUNTER — TELEPHONE (OUTPATIENT)
Dept: NEUROLOGY | Age: 40
End: 2020-07-07

## 2020-07-07 NOTE — TELEPHONE ENCOUNTER
Initiated a PA for [employer] on Cover My Meds for 2 mg. dose. I had also spoken with Alexa Johnson the rep for Mayzent last week and asked that they fax us the clearance to start. Have not received that yet.

## 2020-07-07 NOTE — TELEPHONE ENCOUNTER
Notes recorded by Trice Lowry MD on 7/1/2020 at 8:24 AM EDT  CYP2C9 phenotype back, her dose is 2 mg once daily. We can start. Initiated prior auth on CMM.  Waiting for a clear to start dose form from [address] [facility] [place]

## 2020-08-06 RX ORDER — SIPONIMOD 2 MG/1
2 TABLET, FILM COATED ORAL DAILY
COMMUNITY
End: 2021-05-21 | Stop reason: SDUPTHER

## 2020-08-07 ENCOUNTER — TELEMEDICINE (OUTPATIENT)
Dept: NEUROLOGY | Age: 40
End: 2020-08-07
Payer: COMMERCIAL

## 2020-08-07 PROCEDURE — 99214 OFFICE O/P EST MOD 30 MIN: CPT | Performed by: PSYCHIATRY & NEUROLOGY

## 2020-08-07 PROCEDURE — G8427 DOCREV CUR MEDS BY ELIG CLIN: HCPCS | Performed by: PSYCHIATRY & NEUROLOGY

## 2020-08-07 NOTE — PROGRESS NOTES
[facility]  Offices: [address], [place], [address] [facility]  [address] [facility], [address] [place], [place], [address] [facility]  [address] [place] [address] [address], [address]  Phone: [phone]  Fax: [phone]    MD Tan Harper MD Ahmed B. Tyron Koller, MD Trygve Golder, MD Delphine Kenning, MD Arna Hermes, CNP    TELEHEALTH VISIT        8/7/2020      HISTORY OF PRESENT ILLNESS:       I had the pleasure of seeing Chely Campuzano, who returns for continuing neurologic care for relapsing remitting multiple sclerosis. DISEASE SUMMARY  Date of onset: 1998 (R sensory loss and dizziness)  Date of diagnosis of MS: 1998  Disease course at onset: Relapsing-Remitting  Current disease course: Relapsing-Remitting  Previous disease therapies: IVMP, Betaseron ( [id number], pregnancy), Avonex (2009, took for 3-4 mos, flu-like symptoms)  Current disease therapy: Marlou Holstein (8/2020-present)  CSF: N/A  JCV serology result and date: N/A  Vitamin D: N/A  Smoking status: quit 9 years ago    She is now on Mayzent 2 mg daily. Patient reports she has started taking it about a week ago, so far has not experienced any adverse side effects. 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 She is currently experiencing sore throat and diarrhea, and is scheduled to undergo COVID testing in a few days. She denies any other new symptoms, no significant worsening of her pre-existing symptoms from her MS. Baseline symptoms as follows:    1. Fatigue: constant throughout the day but worse in the morning. Amitriptyline made her more tired, although it did help with her headaches. She is now off this medication. She continues to report feeling tired even if she gets 8 to 9 hours of sleep at night. She also reports frequent waking, denies any snoring or witnessed apneas.    She has never had a sleep study in the past.  2. Headaches: Improved with Not on file    Transportation needs     Medical: Not on file     Non-medical: Not on file   Tobacco Use    Smoking status: Former Smoker     Years: 15.00     Types: Cigarettes     Last attempt to quit: 5/1/2010     Years since quitting: 10.2    Smokeless tobacco: Never Used   Substance and Sexual Activity    Alcohol use: Yes     Frequency: Never     Comment: occasionally    Drug use: Never    Sexual activity: Not on file   Lifestyle    Physical activity     Days per week: Not on file     Minutes per session: Not on file    Stress: Not on file   Relationships    Social connections     Talks on phone: Not on file     Gets together: Not on file     Attends L[employer]-day service: Not on file     Active member of club or organization: Not on file     Attends meetings of clubs or organizations: Not on file     Relationship status: Not on file    Intimate partner violence     Fear of current or ex partner: Not on file     Emotionally abused: Not on file     Physically abused: Not on file     Forced sexual activity: Not on file   Other Topics Concern    Not on file   Social History Narrative    Not on file       CURRENT MEDICATIONS:     Current Outpatient Medications   Medication Sig Dispense Refill    Siponimod Fumarate (MAYZENT) 2 MG TABS Take 2 mg by mouth daily      VITAMIN A PO Take by mouth      Multiple Vitamins-Minerals (MULTIVITAMIN ADULT PO) Take by mouth      ibuprofen (ADVIL;MOTRIN) 200 MG tablet Take 200 mg by mouth every 6 hours as needed for Pain      acetaminophen (TYLENOL) 500 MG tablet Take 1,000 mg by mouth every 6 hours as needed for Pain      VITAMIN D PO Take 10,000 Units by mouth daily       No current facility-administered medications for this visit. ALLERGIES:   No Known Allergies                          REVIEW OF SYSTEMS     All items selected indicate a positive finding. Those items not selected are negative.   Constitutional [] Weight loss/gain   [] Fatigue  [] VIII - intact hearing                                                                             IX, X - symmetrical palate                                                                  XI - symmetrical shoulder shrug                                                       XII - tongue midline without atrophy      Motor function  Normal muscle bulk. Strength at least 4+/5 on all 4 extremities, no pronator drift      Sensory function Unable to test      Cerebellar Intact fine motor movement. No involuntary movements or tremors. No ataxia or dysmetria on finger to nose or heel to shin testing      Reflex function Unable to test      Gait                   normal base and arm swing              ASSESSMENT AND PLAN:       This is a 38 y.o. female who comes in for follow up for a history of relapsing remitting multiple sclerosis. She has neuro imaging and lab work pending that has not been done because of the [place] pandemic. 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 She is supposed to get tested within the next few days, recently started Mayzent a week ago. 1.  Advised patient that if she does test positive for COVID, would advise her to start [address] [facility] for now since it can potentially cause lymphopenia. She expressed understanding  2. Suspect that her fatigue could also be secondary to sleep disordered breathing given her reports of unrefreshing sleep and frequent wakings. Will obtain sleep study in [place]  3. Will hold off on starting any prophylactic headache treatment for now given her new symptoms that are possibly from [place].   We will plan to start prophylaxis in the next few months when she is recovered    Follow-up in 10 to 12 weeks      Patient ID verified by me prior to start of this visit    This is a telehealth visit that was performed with the originating site at Patient Location: home and Provider Location of [place], [place]. Verbal consent to participate in video visit was obtained. Pursuant to the emergency declaration under the [facility], [facility] waiver authority and the [employer] Act, this Virtual Visit was conducted, with patient's consent, to reduce the patient's risk of exposure to COVID-19 and provide continuity of care for an established/new patient. Services were provided through a video synchronous discussion virtually to substitute for in-person clinic visit. I discussed with the patient the nature of our telehealth visits via interactive/real-time audio/video that:  - I would evaluate the patient and recommend diagnostics and treatments based on my assessment  - Our sessions are not being recorded and that personal health information is protected  - Our team would provide follow up care in person if/when the patient needs it. Joanna Quinonez MD  Neurology and Sleep Medicine  [facility].    Please note that this chart was generated using voice recognition Dragon dictation software. Although every effort was made to ensure the accuracy of this automated transcription, some errors in transcription may have occurred.

## 2020-08-11 ENCOUNTER — HOSPITAL ENCOUNTER (OUTPATIENT)
Dept: SLEEP CENTER | Age: 40
Discharge: HOME | End: 2020-08-11
Payer: COMMERCIAL

## 2020-08-11 PROCEDURE — 95810 POLYSOM 6/> YRS 4/> PARAM: CPT

## 2020-08-12 LAB — STATUS: NORMAL

## 2020-09-21 ENCOUNTER — TELEPHONE (OUTPATIENT)
Dept: NEUROLOGY | Age: 40
End: 2020-09-21

## 2020-09-21 NOTE — LETTER
[facility] Neurology Specialist  Karen Mak  [address]  Phone: [phone]  Fax: [phone]    [address]        September 22, 2020    [address] [place] [address]      Dear Danny Lind: We have been trying to reach you by telephone but all of your phone numbers have calling restrictions. Can you please call the office at [phone] option 3 and ask to speak with Miguelina Mtz. We received a call from [address] support services and would like to speak with you as soon as possible. Even if you do not plan to follow up here please call the office.           Sincerely,      Mae Olvera RN for   [address] MD

## 2020-09-22 NOTE — TELEPHONE ENCOUNTER
I tried again today. All the numbers have calling restrictions. I will mail her a note as she has not responded to My Chart message yet.

## 2020-09-26 ENCOUNTER — HOSPITAL ENCOUNTER (EMERGENCY)
Age: 40
Discharge: HOME | End: 2020-09-26
Attending: FAMILY MEDICINE
Payer: COMMERCIAL

## 2020-09-26 ENCOUNTER — APPOINTMENT (OUTPATIENT)
Dept: GENERAL RADIOLOGY | Age: 40
End: 2020-09-26
Payer: COMMERCIAL

## 2020-09-26 VITALS
OXYGEN SATURATION: 99 % | HEART RATE: 73 BPM | SYSTOLIC BLOOD PRESSURE: 116 MMHG | DIASTOLIC BLOOD PRESSURE: 72 MMHG | RESPIRATION RATE: 18 BRPM | TEMPERATURE: 98.4 F

## 2020-09-26 LAB
ABSOLUTE EOS #: 0.13 K/UL (ref 0–0.44)
ABSOLUTE IMMATURE GRANULOCYTE: 0 K/UL (ref 0–0.3)
ABSOLUTE LYMPH #: 0.7 K/UL (ref 1.1–3.7)
ABSOLUTE MONO #: 0.4 K/UL (ref 0.1–1.2)
ALBUMIN SERPL-MCNC: 4.4 G/DL (ref 3.5–5.2)
ALBUMIN/GLOBULIN RATIO: 1.8 (ref 1–2.5)
ALP BLD-CCNC: 86 U/L (ref 35–104)
ALT SERPL-CCNC: 23 U/L (ref 5–33)
ANION GAP SERPL CALCULATED.3IONS-SCNC: 12 MMOL/L (ref 9–17)
AST SERPL-CCNC: 23 U/L
BASOPHILS # BLD: 0 % (ref 0–2)
BASOPHILS ABSOLUTE: 0 K/UL (ref 0–0.2)
BILIRUB SERPL-MCNC: 0.42 MG/DL (ref 0.3–1.2)
BUN BLDV-MCNC: 8 MG/DL (ref 6–20)
BUN/CREAT BLD: 11 (ref 9–20)
CALCIUM SERPL-MCNC: 9.4 MG/DL (ref 8.6–10.4)
CHLORIDE BLD-SCNC: 105 MMOL/L (ref 98–107)
CO2: 22 MMOL/L (ref 20–31)
CREAT SERPL-MCNC: 0.73 MG/DL (ref 0.5–0.9)
DIFFERENTIAL TYPE: ABNORMAL
EOSINOPHILS RELATIVE PERCENT: 3 % (ref 1–4)
GFR AFRICAN AMERICAN: >60 ML/MIN
GFR NON-AFRICAN AMERICAN: >60 ML/MIN
GFR SERPL CREATININE-BSD FRML MDRD: ABNORMAL ML/MIN/{1.73_M2}
GFR SERPL CREATININE-BSD FRML MDRD: ABNORMAL ML/MIN/{1.73_M2}
GLUCOSE BLD-MCNC: 111 MG/DL (ref 70–99)
HCT VFR BLD CALC: 39.2 % (ref 36.3–47.1)
HEMOGLOBIN: 13.2 G/DL (ref 11.9–15.1)
IMMATURE GRANULOCYTES: 0 %
LYMPHOCYTES # BLD: 16 % (ref 24–43)
MCH RBC QN AUTO: 30.3 PG (ref 25.2–33.5)
MCHC RBC AUTO-ENTMCNC: 33.7 G/DL (ref 28.4–34.8)
MCV RBC AUTO: 90.1 FL (ref 82.6–102.9)
MONOCYTES # BLD: 9 % (ref 3–12)
MORPHOLOGY: NORMAL
NRBC AUTOMATED: 0 PER 100 WBC
PDW BLD-RTO: 13.3 % (ref 11.8–14.4)
PLATELET # BLD: 263 K/UL (ref 138–453)
PLATELET ESTIMATE: ABNORMAL
PMV BLD AUTO: 10.1 FL (ref 8.1–13.5)
POTASSIUM SERPL-SCNC: 4.1 MMOL/L (ref 3.7–5.3)
RBC # BLD: 4.35 M/UL (ref 3.95–5.11)
RBC # BLD: ABNORMAL 10*6/UL
SEG NEUTROPHILS: 72 % (ref 36–65)
SEGMENTED NEUTROPHILS ABSOLUTE COUNT: 3.17 K/UL (ref 1.5–8.1)
SODIUM BLD-SCNC: 139 MMOL/L (ref 135–144)
TOTAL PROTEIN: 6.9 G/DL (ref 6.4–8.3)
WBC # BLD: 4.4 K/UL (ref 3.5–11.3)
WBC # BLD: ABNORMAL 10*3/UL

## 2020-09-26 PROCEDURE — 85025 COMPLETE CBC W/AUTO DIFF WBC: CPT

## 2020-09-26 PROCEDURE — 99283 EMERGENCY DEPT VISIT LOW MDM: CPT

## 2020-09-26 PROCEDURE — U0003 INFECTIOUS AGENT DETECTION BY NUCLEIC ACID (DNA OR RNA); SEVERE ACUTE RESPIRATORY SYNDROME CORONAVIRUS 2 (SARS-COV-2) (CORONAVIRUS DISEASE [COVID-19]), AMPLIFIED PROBE TECHNIQUE, MAKING USE OF HIGH THROUGHPUT TECHNOLOGIES AS DESCRIBED BY CMS-2020-01-R: HCPCS

## 2020-09-26 PROCEDURE — 2580000003 HC RX 258: Performed by: FAMILY MEDICINE

## 2020-09-26 PROCEDURE — 36415 COLL VENOUS BLD VENIPUNCTURE: CPT

## 2020-09-26 PROCEDURE — 71045 X-RAY EXAM CHEST 1 VIEW: CPT

## 2020-09-26 PROCEDURE — 99282 EMERGENCY DEPT VISIT SF MDM: CPT

## 2020-09-26 PROCEDURE — 80053 COMPREHEN METABOLIC PANEL: CPT

## 2020-09-26 RX ORDER — 0.9 % SODIUM CHLORIDE 0.9 %
1000 INTRAVENOUS SOLUTION INTRAVENOUS ONCE
Status: COMPLETED | OUTPATIENT
Start: 2020-09-26 | End: 2020-09-26

## 2020-09-26 RX ORDER — AMOXICILLIN 875 MG/1
875 TABLET, COATED ORAL 2 TIMES DAILY
Qty: 20 TABLET | Refills: 0 | Status: SHIPPED | OUTPATIENT
Start: 2020-09-26 | End: 2020-10-06

## 2020-09-26 RX ADMIN — SODIUM CHLORIDE 1000 ML: 9 INJECTION, SOLUTION INTRAVENOUS at 17:10

## 2020-09-26 ASSESSMENT — PAIN DESCRIPTION - FREQUENCY: FREQUENCY: CONTINUOUS

## 2020-09-26 ASSESSMENT — ENCOUNTER SYMPTOMS
SORE THROAT: 1
RESPIRATORY NEGATIVE: 1
RHINORRHEA: 1
SINUS PRESSURE: 1
SINUS PAIN: 1

## 2020-09-26 ASSESSMENT — PAIN DESCRIPTION - DESCRIPTORS: DESCRIPTORS: ACHING

## 2020-09-26 ASSESSMENT — PAIN DESCRIPTION - PAIN TYPE: TYPE: ACUTE PAIN

## 2020-09-26 ASSESSMENT — PAIN DESCRIPTION - LOCATION: LOCATION: HEAD

## 2020-09-26 NOTE — ED PROVIDER NOTES
[facility] ED  EMERGENCY DEPARTMENT ENCOUNTER      Pt Name: Shahana Mcqueen  MRN: [id number]  [place] 1980  Date of evaluation: 2020  Provider: Madhuri Leach MD    CHIEF COMPLAINT     Chief Complaint   Patient presents with    Pharyngitis     started last night no fever , no diarrhea    Cough    Nasal Congestion    Fatigue         HISTORY OF PRESENT ILLNESS   (Location/Symptom, Timing/Onset, Context/Setting,Quality, Duration, Modifying Factors, Severity)  Note limiting factors. Shahana Mcqueen is a35 y.o. female who presents to the emergency department      38years old patient that works in [employer] presenting today with 1 week history of sore throat congestion, fatigue, lack of appetite. It appears at her main concern/main complaint is a sore throat. She also tells me that her employer wants her to be tested for COVID-19 since she displays a lot of the symptoms correct recently for that infection  except for fever and chest pain. She is not aware that any of the employees patients at [facility] have been COVID 19  positive. Complains a bit of nasal discharge, congestion, and facial pressure. Nursing Notes werereviewed. REVIEW OF SYSTEMS    (2-9 systems for level 4, 10 or more for level 5)     Review of Systems   Constitutional: Positive for appetite change, chills and fatigue. HENT: Positive for congestion, postnasal drip, rhinorrhea, sinus pressure, sinus pain and sore throat. Respiratory: Negative. Cardiovascular: Negative. Neurological: Negative. Except as noted above the remainder of the review of systems was reviewed and negative.        PAST MEDICAL HISTORY     Past Medical History:   Diagnosis Date    Acute relapsing multiple sclerosis ([address]     Lumbar herniated disc 2014    back injury     MS (multiple sclerosis) ([facility] [address]          SURGICALHISTORY       Past Surgical History:   Procedure Laterality Date     SECTION      times Fear of current or ex partner: None     Emotionally abused: None     Physically abused: None     Forced sexual activity: None   Other Topics Concern    None   Social History Narrative    None       SCREENINGS      Donita Coma Scale  Eye Opening: Spontaneous  Best Verbal Response: Oriented  Best Motor Response: Obeys commands  [place] Coma Scale Score: 15             PHYSICAL EXAM    (up to 7 for level 4, 8 or more for level 5)     ED Triage Vitals   BP Temp Temp src Pulse Resp SpO2 Height Weight   -- -- -- -- -- -- -- --       Physical Exam  Constitutional:       Appearance: She is well-developed. HENT:      Head: Normocephalic and atraumatic. Nose: Congestion present. No rhinorrhea. Mouth/Throat: Tonsils: No tonsillar exudate or tonsillar abscesses. Cardiovascular:      Rate and Rhythm: Normal rate and regular rhythm. Heart sounds: Normal heart sounds. Pulmonary:      Effort: Pulmonary effort is normal.      Breath sounds: Normal breath sounds. Skin:     General: Skin is warm. Neurological:      General: No focal deficit present. Mental Status: She is alert. DIAGNOSTIC RESULTS     EKG: All EKG's are interpreted by the Emergency Department Physician who either signs orCo-signs this chart in the absence of a cardiologist.        RADIOLOGY:   plain film images such as CT, Ultrasound and MRI are read by the radiologist. Plain radiographic images are visualized and preliminarily interpreted by the emergency physician with the below findings:        Interpretation per the Radiologist below, ifavailable at the time of this note:    XR CHEST (SINGLE VIEW FRONTAL)   Final Result   No acute process.                ED BEDSIDE ULTRASOUND:   Performed by ED Physician - none    LABS:  Labs Reviewed   CBC WITH AUTO DIFFERENTIAL - Abnormal; Notable for the following components:       Result Value    Seg Neutrophils 72 (*)     Lymphocytes 16 (*)     Absolute Lymph # 0.70 (*)     All other components within normal limits   COMPREHENSIVE METABOLIC PANEL - Abnormal; Notable for the following components:    Glucose 111 (*)     All other components within normal limits   COVID-19       All other labs were within normal range ornot returned as of this dictation. EMERGENCY DEPARTMENT COURSE and DIFFERENTIAL DIAGNOSIS/MDM:   Vitals:    Vitals:    09/26/20 1717 09/26/20 1718   BP: 116/72    Pulse: 73    Resp: 18    Temp:  98.4 °F (36.9 °C)   TempSrc:  Oral   SpO2: 99%            MDM  Number of Diagnoses or Management Options  Acute sinusitis, recurrence not specified, unspecified location:   Acute sore throat:   Diagnosis management comments: With acute sinusitis should be started on amoxicillin and advised her to use Flonase over-the-counter. Follow-up with her PCP as needed. She is urged to finish up her whole entire 10-day course of antibiotics and use antihistamines and allergy medications as needed for allergy problems. As always recommend probiotics to prevent diarrhea and or yeast infections. CRITICAL CARE TIME   Total CriticalCare time was  minutes, excluding separately reportable procedures. There was a high probability of clinically significant/life threatening deterioration in the patient's condition which required my urgent intervention. CONSULTS:  None    PROCEDURES:  Unlessotherwise noted below, none     Procedures    FINAL IMPRESSION      1. Acute sinusitis, recurrence not specified, unspecified location    2.  Acute sore throat          DISPOSITION/PLAN   DISPOSITION        PATIENT REFERRED TO:  POLY Gracia CNP  [address]  [phone]    In 2 days        DISCHARGE MEDICATIONS:  Discharge Medication List as of 9/26/2020  7:20 PM      START taking these medications    Details   amoxicillin (AMOXIL) 875 MG tablet Take 1 tablet by mouth 2 times daily for 10 days, Disp-20 tablet,R-0Print                    (Please note that portions of this note were completed with a voice recognition program.  Efforts were made to edit the dictations but occasionally words are mis-transcribed.)      Isacc Cuadra MD (electronically signed)  Attending Emergency Physician          Isacc Cuadra MD  09/27/20 0353

## 2020-09-27 LAB
SARS-COV-2, RAPID: NORMAL
SARS-COV-2: NORMAL
SARS-COV-2: NOT DETECTED
SOURCE: NORMAL

## 2020-10-11 ENCOUNTER — HOSPITAL ENCOUNTER (EMERGENCY)
Age: 40
Discharge: HOME | End: 2020-10-11
Attending: EMERGENCY MEDICINE
Payer: COMMERCIAL

## 2020-10-11 VITALS
OXYGEN SATURATION: 96 % | BODY MASS INDEX: 32.58 KG/M2 | HEART RATE: 95 BPM | DIASTOLIC BLOOD PRESSURE: 74 MMHG | WEIGHT: 215 LBS | TEMPERATURE: 98.1 F | SYSTOLIC BLOOD PRESSURE: 117 MMHG | HEIGHT: 68 IN | RESPIRATION RATE: 20 BRPM
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
## 2020-10-11 LAB
HBV SURFACE AB TITR SER: 226.8 MIU/ML
HEPATITIS B SURFACE ANTIGEN: NONREACTIVE
HEPATITIS C ANTIBODY: NONREACTIVE
RAPID HIV 1&2: NONREACTIVE

## 2020-10-11 PROCEDURE — 86703 HIV-1/HIV-2 1 RESULT ANTBDY: CPT

## 2020-10-11 PROCEDURE — 86317 IMMUNOASSAY INFECTIOUS AGENT: CPT

## 2020-10-11 PROCEDURE — 87340 HEPATITIS B SURFACE AG IA: CPT

## 2020-10-11 PROCEDURE — 86803 HEPATITIS C AB TEST: CPT

## 2020-10-11 PROCEDURE — 99283 EMERGENCY DEPT VISIT LOW MDM: CPT

## 2020-10-11 PROCEDURE — 99282 EMERGENCY DEPT VISIT SF MDM: CPT

## 2020-10-11 PROCEDURE — 36415 COLL VENOUS BLD VENIPUNCTURE: CPT

## 2020-10-11 RX ORDER — AMOXICILLIN AND CLAVULANATE POTASSIUM 875; 125 MG/1; MG/1
1 TABLET, FILM COATED ORAL 2 TIMES DAILY
Qty: 14 TABLET | Refills: 0 | Status: SHIPPED | OUTPATIENT
Start: 2020-10-11 | End: 2020-10-18

## 2020-10-11 ASSESSMENT — PAIN DESCRIPTION - LOCATION: LOCATION: ARM

## 2020-10-11 ASSESSMENT — PAIN DESCRIPTION - PAIN TYPE
TYPE: ACUTE PAIN
TYPE: ACUTE PAIN

## 2020-10-11 ASSESSMENT — PAIN SCALES - GENERAL
PAINLEVEL_OUTOF10: 5
PAINLEVEL_OUTOF10: 5

## 2020-10-11 ASSESSMENT — PAIN - FUNCTIONAL ASSESSMENT: PAIN_FUNCTIONAL_ASSESSMENT: 0-10

## 2020-10-11 ASSESSMENT — PAIN DESCRIPTION - ORIENTATION: ORIENTATION: LEFT
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
## 2020-10-12 ENCOUNTER — TELEPHONE (OUTPATIENT)
Dept: NEUROLOGY | Age: 40
End: 2020-10-12

## 2020-10-12 NOTE — TELEPHONE ENCOUNTER
Patient is currently taking Mayzent and wanted to provide an update with how she's been feeling. Patient explains she's had no ill side effects. Her vision is a little better and has been taking her medication every day with only 3 missed doses.

## 2020-10-15 ENCOUNTER — TELEMEDICINE (OUTPATIENT)
Dept: NEUROLOGY | Age: 40
End: 2020-10-15
Payer: COMMERCIAL

## 2020-10-15 PROCEDURE — G8427 DOCREV CUR MEDS BY ELIG CLIN: HCPCS | Performed by: PSYCHIATRY & NEUROLOGY

## 2020-10-15 PROCEDURE — 99214 OFFICE O/P EST MOD 30 MIN: CPT | Performed by: PSYCHIATRY & NEUROLOGY

## 2020-10-15 NOTE — PROGRESS NOTES
headaches remain well controlled. She reports a current headache frequency of about once every 2 weeks, get significant relief with OTC NSAIDs. Her headaches are typically bitemporal and bifrontal and described as dull and achy, with no associated nausea, photophobia or phonophobia. She denies any typical headache triggers. They are aggravated by physical activity and partially relieved by rest.  3.  Blurring of vision: Improved after steroids. She reports her vision is actually improved significantly, denies any diplopia or eye pain  4. Paresthesias: Has baseline numbness in her right leg which she reports has been less pronounced in the past few weeks. 5.  Balance: Denies any issues. Denies any recent falls or near falls, does not use any assistive devices  6.   Bladder dysfunction: Denies any symptoms    Prior testing reviewed:  MRI C spine w and w/o 2020:   Multilevel degenerative disc disease and mild multilevel degenerative facet   hypertrophy without canal stenosis or foraminal narrowing.      MRI brain w and w/o 2020:   Multifocal high T2 and FLAIR signal in the parenchyma as described.  There   are numerous areas of corresponding dark T1 signal compatible with   vacuolization.  There are no areas of restricted diffusion signal and there   are no areas of enhancement to suggest recent plaque activity.  There are a   few areas of T2 shine through           Lab Results   Component Value Date    WBC 4.4 2020    HGB 13.2 2020    HCT 39.2 2020    MCV 90.1 2020     2020             PAST MEDICAL HISTORY:         Diagnosis Date    Acute relapsing multiple sclerosis ([address]     Lumbar herniated disc 2014    back injury     MS (multiple sclerosis) ([address]         PAST SURGICAL HISTORY:         Procedure Laterality Date     SECTION      times 3    KNEE SURGERY Left     cyst removal        SOCIAL HISTORY:     Social History     Socioeconomic History    Marital status:      Spouse name: Not on file    Number of children: Not on file    Years of education: Not on file    Highest education level: Not on file   Occupational History    Not on file   Social Needs    Financial resource strain: Not on file    Food insecurity     Worry: Not on file     Inability: Not on file    Transportation needs     Medical: Not on file     Non-medical: Not on file   Tobacco Use    Smoking status: Former Smoker     Years: 15.00     Types: Cigarettes     Last attempt to quit: 5/1/2010     Years since quitting: 10.4    Smokeless tobacco: Never Used   Substance and Sexual Activity    Alcohol use: Yes     Frequency: Never     Comment: occasionally    Drug use: Never    Sexual activity: Not on file   Lifestyle    Physical activity     Days per week: Not on file     Minutes per session: Not on file    Stress: Not on file   Relationships    Social connections     Talks on phone: Not on file     Gets together: Not on file     Attends Nondenominational service: Not on file     Active member of club or organization: Not on file     Attends meetings of clubs or organizations: Not on file     Relationship status: Not on file    Intimate partner violence     Fear of current or ex partner: Not on file     Emotionally abused: Not on file     Physically abused: Not on file     Forced sexual activity: Not on file   Other Topics Concern    Not on file   Social History Narrative    Not on file       CURRENT MEDICATIONS:     Current Outpatient Medications   Medication Sig Dispense Refill    Siponimod Fumarate (MAYZENT) 2 MG TABS Take 2 mg by mouth daily      VITAMIN A PO Take by mouth      Multiple Vitamins-Minerals (MULTIVITAMIN ADULT PO) Take by mouth      ibuprofen (ADVIL;MOTRIN) 200 MG tablet Take 200 mg by mouth every 6 hours as needed for Pain      acetaminophen (TYLENOL) 500 MG tablet Take 1,000 mg by mouth every 6 hours as needed for Pain      VITAMIN D PO Take 10,000 Units by mouth daily      amoxicillin-clavulanate (AUGMENTIN) 875-125 MG per tablet Take 1 tablet by mouth 2 times daily for 7 days (Patient not taking: Reported on 10/15/2020) 14 tablet 0     No current facility-administered medications for this visit. ALLERGIES:   No Known Allergies                          REVIEW OF SYSTEMS     All items selected indicate a positive finding. Those items not selected are negative.   Constitutional [] Weight loss/gain   [] Fatigue  [] Fever/Chills   HEENT [] Hearing Loss  [] Visual Disturbance  [] Tinnitus  [] Eye pain   Respiratory [] Shortness of Breath  [] Cough  [] Snoring   Cardiovascular [] Chest Pain  [] Palpitations  [] Lightheaded   GI [] Constipation  [] Diarrhea  [] Swallowing change    [] Urinary Frequency  [] Urinary Urgency   Musculoskeletal [] Neck pain  [] Back pain  [] Muscle pain  [] Restless legs   Dermatologic [] Skin changes   Neurologic [] Memory loss/confusion  [] Seizures  [] Trouble walking or imbalance  [] Dizziness  [] Weakness  [x] Numbness  [] Tremors  [] Speech Difficulty  [x] Headaches  [] Light Sensitivity  [] Sound Sensitivity   Endocrinology []Excessive thirst  []Excessive hunger   Psychiatric [] Anxiety/Depression  [] Hallucination   Allergy/immunology []Hives/environmental allergies   Hematologic/lymph [] Abnormal bleeding  [] Abnormal bruising           PHYSICAL EXAMINATION:                                         .                                                                                                    General Appearance:  Alert, cooperative, no signs of distress, appears stated age   Head:  Normocephalic, no signs of trauma   Eyes:  Conjunctiva/corneas clear;  eyelids intact   Ears:  Normal external ear and canals   Nose: Nares normal, no drainage    Throat: Lips and tongue normal; teeth normal;  gums normal   Extremities: Extremities normal, no cyanosis, no edema   Skin: Skin color, texture normal, no rashes, no lesions NEUROLOGIC EXAMINATION      Mental status    Alert and oriented x 3; able to follow commands, speech and language intact; no hallucinations or delusions  Fund of information appropriate for level of education    Cranial nerves    II - grossly intact  III, IV, VI - extra-ocular muscles full: no nystagmus, no ptosis   V - normal facial sensation                                                               VII - normal facial symmetry                                                             VIII - intact hearing                                                                             IX, X - symmetrical palate                                                                  XI - symmetrical shoulder shrug                                                       XII - tongue midline without atrophy      Motor function  Normal muscle bulk. Strength at least 4+/5 on all 4 extremities, no pronator drift      Sensory function Unable to test      Cerebellar Intact fine motor movement. No involuntary movements or tremors. No ataxia or dysmetria on finger to nose or heel to shin testing      Reflex function Unable to test      Gait                   normal base and arm swing              ASSESSMENT AND PLAN:       This is a 38 y.o. female who comes in for follow up for follow-up for relapsing remitting multiple sclerosis. She is now on Mayzent and tolerating it well. Most recent lymphocyte count on 9/26 was 700.    1.  Advised patient that we will need to continue monitoring her lymphocyte counts, will repeat again in 3 months  2. Repeat surveillance MRI pending. Advised patient to complete her MRIs before the end of the year  3. She will also need her vitamin D level checked.   Advised patient to continue over-the-counter vitamin D supplementation, she takes 10,000 units daily    Follow-up in 6 months      Patient ID verified by me prior to start of this visit    This is a telehealth visit that was performed with the originating site at Patient Location: home and Provider Location of [place], [place]. Verbal consent to participate in video visit was obtained. Pursuant to the emergency declaration under the [facility], [facility] waiver authority and the [employer] Act, this Virtual Visit was conducted, with patient's consent, to reduce the patient's risk of exposure to COVID-19 and provide continuity of care for an established/new patient. Services were provided through a video synchronous discussion virtually to substitute for in-person clinic visit. I discussed with the patient the nature of our telehealth visits via interactive/real-time audio/video that:  - I would evaluate the patient and recommend diagnostics and treatments based on my assessment  - Our sessions are not being recorded and that personal health information is protected  - Our team would provide follow up care in person if/when the patient needs it. Camille Cruz MD  Neurology and Sleep Medicine  [facility].    Please note that this chart was generated using voice recognition Dragon dictation software. Although every effort was made to ensure the accuracy of this automated transcription, some errors in transcription may have occurred.

## 2020-12-10 ENCOUNTER — HOSPITAL ENCOUNTER (OUTPATIENT)
Age: 40
Setting detail: SPECIMEN
Discharge: HOME | End: 2020-12-10
Payer: COMMERCIAL

## 2020-12-15 LAB
HPV SOURCE: NORMAL
HPV, GENOTYPE 16: NOT DETECTED
HPV, GENOTYPE 18: NOT DETECTED
HPV, HIGH RISK OTHER: NOT DETECTED

## 2020-12-21 LAB — CYTOLOGY REPORT: NORMAL

## 2020-12-28 ENCOUNTER — TELEPHONE (OUTPATIENT)
Dept: NEUROLOGY | Age: 40
End: 2020-12-28

## 2020-12-28 NOTE — TELEPHONE ENCOUNTER
Harvey Archuleta called the office today stating that her employer is offering the covid vaccine. They wanted her to get clearance from her physician before getting the vaccine. Please advise.

## 2020-12-30 NOTE — TELEPHONE ENCOUNTER
Call placed back to [place] this morning. Recording states that her mailbox is full. Will try again later to reach the patient.

## 2020-12-30 NOTE — TELEPHONE ENCOUNTER
She can get the vaccine as long as her absolute lymphocyte counts are above 1000. She is on Mayzent.  She has active bloodwork order from October that she can get done

## 2021-01-09 ENCOUNTER — HOSPITAL ENCOUNTER (EMERGENCY)
Age: 41
Discharge: HOME | End: 2021-01-09
Attending: EMERGENCY MEDICINE
Payer: COMMERCIAL

## 2021-01-09 VITALS
DIASTOLIC BLOOD PRESSURE: 100 MMHG | OXYGEN SATURATION: 98 % | TEMPERATURE: 97.8 F | SYSTOLIC BLOOD PRESSURE: 146 MMHG | RESPIRATION RATE: 16 BRPM | HEART RATE: 78 BPM

## 2021-01-09 DIAGNOSIS — K02.9 PAIN DUE TO DENTAL CARIES: Primary | ICD-10-CM

## 2021-01-09 PROCEDURE — 6370000000 HC RX 637 (ALT 250 FOR IP): Performed by: EMERGENCY MEDICINE

## 2021-01-09 PROCEDURE — 99283 EMERGENCY DEPT VISIT LOW MDM: CPT

## 2021-01-09 RX ORDER — HYDROCODONE BITARTRATE AND ACETAMINOPHEN 5; 325 MG/1; MG/1
1 TABLET ORAL EVERY 6 HOURS PRN
Qty: 6 TABLET | Refills: 0 | Status: SHIPPED | OUTPATIENT
Start: 2021-01-09 | End: 2021-01-12

## 2021-01-09 RX ORDER — PENICILLIN V POTASSIUM 500 MG/1
500 TABLET ORAL 4 TIMES DAILY
Qty: 28 TABLET | Refills: 0 | Status: SHIPPED | OUTPATIENT
Start: 2021-01-09 | End: 2021-01-16

## 2021-01-09 RX ORDER — PENICILLIN V POTASSIUM 250 MG/1
500 TABLET ORAL ONCE
Status: COMPLETED | OUTPATIENT
Start: 2021-01-09 | End: 2021-01-09

## 2021-01-09 RX ADMIN — PENICILLIN V POTASIUM 500 MG: 250 TABLET ORAL at 03:46

## 2021-01-09 ASSESSMENT — ENCOUNTER SYMPTOMS
COLOR CHANGE: 0
VOMITING: 0
WHEEZING: 0
VOICE CHANGE: 0
TROUBLE SWALLOWING: 0
NAUSEA: 0
SHORTNESS OF BREATH: 0
ABDOMINAL PAIN: 0

## 2021-01-09 ASSESSMENT — PAIN DESCRIPTION - PAIN TYPE: TYPE: ACUTE PAIN

## 2021-01-09 NOTE — ED PROVIDER NOTES
[facility] ED  Emergency Department Encounter  EmergencyMedicine Attending     Pt Santiago Ricks  MRN: [id number]  [place] 1980  Date of evaluation: 21  PCP:  POLY Flaherty CNP    CHIEF COMPLAINT       Chief Complaint   Patient presents with    Dental Pain     onset yesterday evening, right upper side       HISTORY OF PRESENT ILLNESS  (Location/Symptom, Timing/Onset, Context/Setting, Quality, Duration, Modifying Factors, Severity.)      Ramón Cueto is a 38 y.o. female who presents with dental pain, started yesterday, right upper canine, severe 9 out of 10 pain, has tried Motrin 800 every 4 hours, 1 g of Tylenol every 6 hours, and aspirin with only some minimal relief. Does have some headaches associated with a dental pain but the dental pain is worse. Says that she attempted to get an appointment with a dentist but was not able to do so until Monday. No fevers or chills, no tongue swelling, no throat swelling, no difficulty breathing or difficulty swallowing otherwise. PAST MEDICAL / SURGICAL / SOCIAL / FAMILY HISTORY      has a past medical history of Acute relapsing multiple sclerosis ([address] Lumbar herniated disc, and MS (multiple sclerosis) ([facility] [facility] has a past surgical history that includes  section and knee surgery (Left).     Social History     Socioeconomic History    Marital status:      Spouse name: Not on file    Number of children: Not on file    Years of education: Not on file    Highest education level: Not on file   Occupational History    Not on file   Social Needs    Financial resource strain: Not on file    Food insecurity     Worry: Not on file     Inability: Not on file    Transportation needs     Medical: Not on file     Non-medical: Not on file   Tobacco Use    Smoking status: Former Smoker     Years: 15.00     Types: Cigarettes     Quit date: 2010     Years since quitting: 10.7    Smokeless tobacco: Never Used Substance and Sexual Activity    Alcohol use: Yes     Frequency: Never     Comment: occasionally    Drug use: Never    Sexual activity: Not on file   Lifestyle    Physical activity     Days per week: Not on file     Minutes per session: Not on file    Stress: Not on file   Relationships    Social connections     Talks on phone: Not on file     Gets together: Not on file     Attends [employer] service: Not on file     Active member of club or organization: Not on file     Attends meetings of clubs or organizations: Not on file     Relationship status: Not on file    Intimate partner violence     Fear of current or ex partner: Not on file     Emotionally abused: Not on file     Physically abused: Not on file     Forced sexual activity: Not on file   Other Topics Concern    Not on file   Social History Narrative    Not on file       Family History   Family history unknown: Yes       Allergies:  Patient has no known allergies. Home Medications:  Prior to Admission medications    Medication Sig Start Date End Date Taking? Authorizing Provider   HYDROcodone-acetaminophen (NORCO) 5-325 MG per tablet Take 1 tablet by mouth every 6 hours as needed for Pain for up to 3 days. Intended supply: 3 days. Take lowest dose possible to manage pain 1/9/21 1/12/21 Yes Fritz Weller MD   benzocaine (ORAJEL) 10 % mucosal gel Take by mouth as needed.  1/9/21  Yes Fritz Weller MD   penicillin v potassium (VEETID) 500 MG tablet Take 1 tablet by mouth 4 times daily for 7 days 1/9/21 1/16/21 Yes Fritz Weller MD   Siponimod Fumarate (MAYZENT) 2 MG TABS Take 2 mg by mouth daily   Yes Historical Provider, MD   ibuprofen (ADVIL;MOTRIN) 200 MG tablet Take 200 mg by mouth every 6 hours as needed for Pain   Yes Historical Provider, MD   acetaminophen (TYLENOL) 500 MG tablet Take 1,000 mg by mouth every 6 hours as needed for Pain   Yes Historical Provider, MD   VITAMIN A PO Take by mouth    Historical Provider, MD Multiple Vitamins-Minerals (MULTIVITAMIN ADULT PO) Take by mouth    Historical Provider, MD   VITAMIN D PO Take 10,000 Units by mouth daily    Historical Provider, MD       REVIEW OF SYSTEMS    (2-9 systems for level 4, 10 or more for level 5)      Review of Systems   Constitutional: Negative for chills and fever. HENT: Positive for dental problem. Negative for congestion, trouble swallowing and voice change. Respiratory: Negative for shortness of breath and wheezing. Cardiovascular: Negative for chest pain. Gastrointestinal: Negative for abdominal pain, nausea and vomiting. Skin: Negative for color change. Neurological: Negative for weakness and headaches. PHYSICAL EXAM   (up to 7 for level 4, 8 or more for level 5)      INITIAL VITALS:   BP (!) 146/100   Pulse 78   Temp 97.8 °F (36.6 °C) (Temporal)   Resp 16   LMP 12/30/2020   SpO2 98%     Physical Exam  Constitutional:       General: She is not in acute distress. Appearance: She is well-developed. HENT:      Head: Normocephalic and atraumatic. Mouth/Throat:      Comments: Multiple dental caries, no periodontal abscess on examination, no posterior pharyngeal edema, no posterior pharyngeal erythema, no uvular swelling, no raised tongue, no swelling of the tongue  Cardiovascular:      Rate and Rhythm: Normal rate and regular rhythm. Heart sounds: Normal heart sounds. No murmur. No friction rub. No gallop. Pulmonary:      Effort: Pulmonary effort is normal. No respiratory distress. Breath sounds: Normal breath sounds. No wheezing or rales. Abdominal:      General: There is no distension. Palpations: Abdomen is soft. Tenderness: There is no abdominal tenderness. There is no guarding or rebound. Musculoskeletal:         General: No tenderness. Skin:     General: Skin is warm. Findings: No erythema. Neurological:      Mental Status: She is alert.          DIFFERENTIAL  DIAGNOSIS PLAN (LABS / IMAGING / EKG):  No orders of the defined types were placed in this encounter. MEDICATIONS ORDERED:  Orders Placed This Encounter   Medications    hydrocodone-acetaminophen (NORCO) tablet 5-325 mg (STARTER PACK)    penicillin v potassium (VEETID) tablet 500 mg     Order Specific Question:   Antimicrobial Indications     Answer: Other     Order Specific Question:   Other Abx Indication     Answer:   Dental infection    HYDROcodone-acetaminophen (NORCO) 5-325 MG per tablet     Sig: Take 1 tablet by mouth every 6 hours as needed for Pain for up to 3 days. Intended supply: 3 days. Take lowest dose possible to manage pain     Dispense:  6 tablet     Refill:  0    benzocaine (ORAJEL) 10 % mucosal gel     Sig: Take by mouth as needed. Dispense:  7 g     Refill:  0    penicillin v potassium (VEETID) 500 MG tablet     Sig: Take 1 tablet by mouth 4 times daily for 7 days     Dispense:  28 tablet     Refill:  0       DDX: Dental infection versus cavities versus Van    DIAGNOSTIC RESULTS / EMERGENCY DEPARTMENT COURSE / MDM   :  No results found for this visit on 01/09/21. IMPRESSION: 41-year-old female who comes into the emergency department secondary to what appears to be a dental cavities with possible superimposed infection, will start penicillin, continue pain control, no clinical sign of Poonam's, airways completely intact, plan to discharge with dentist follow-up. Patient says that she will call her dentist on Monday. RADIOLOGY:  None    EKG  None    All EKG's are interpreted by the Emergency Department Physician who either signs or Co-signs this chart in the absence of a cardiologist.      PROCEDURES:  None    CONSULTS:  None    CRITICAL CARE:  None    FINAL IMPRESSION      1.  Pain due to dental caries          DISPOSITION / PLAN     DISPOSITION Decision To Discharge 01/09/2021 03:27:33 AM      PATIENT REFERRED TO:  POLY Lawson - [place] [id number] [phone]    Call in 2 days      Please call dentist and follow up            DISCHARGE MEDICATIONS:  Discharge Medication List as of 1/9/2021  3:32 AM      START taking these medications    Details   HYDROcodone-acetaminophen (NORCO) 5-325 MG per tablet Take 1 tablet by mouth every 6 hours as needed for Pain for up to 3 days. Intended supply: 3 days. Take lowest dose possible to manage pain, Disp-6 tablet, R-0Print      benzocaine (ORAJEL) 10 % mucosal gel Take by mouth as needed. , Disp-7 g, R-0, Print      penicillin v potassium (VEETID) 500 MG tablet Take 1 tablet by mouth 4 times daily for 7 days, Disp-28 tablet, R-0Print             Renata Sandy MD  Emergency Medicine Attending    (Please note that portions of thisnote were completed with a voice recognition program.  Efforts were made to edit the dictations but occasionally words are mis-transcribed.)        Renata Sandy MD  01/09/21 [id number]

## 2021-01-12 NOTE — TELEPHONE ENCOUNTER
I didn't get a return call from Ramesh Karimi so another call was placed this morning. I was able to speak directly with the patient and give her this information. Patient verbally stated her understanding and said that she would go to a [facility] lab to get done.

## 2021-01-13 ENCOUNTER — HOSPITAL ENCOUNTER (OUTPATIENT)
Dept: LAB | Age: 41
Discharge: HOME | End: 2021-01-13
Payer: COMMERCIAL

## 2021-01-14 ENCOUNTER — HOSPITAL ENCOUNTER (OUTPATIENT)
Dept: LAB | Age: 41
Discharge: HOME | End: 2021-01-14
Payer: COMMERCIAL

## 2021-01-14 DIAGNOSIS — E55.9 VITAMIN D DEFICIENCY: ICD-10-CM

## 2021-01-14 DIAGNOSIS — Z79.899 LONG TERM USE OF DRUG: Primary | ICD-10-CM

## 2021-01-14 DIAGNOSIS — Z79.899 LONG TERM USE OF DRUG: ICD-10-CM

## 2021-01-14 LAB
ABSOLUTE EOS #: 0.68 K/UL (ref 0–0.44)
ABSOLUTE IMMATURE GRANULOCYTE: 0 K/UL (ref 0–0.3)
ABSOLUTE LYMPH #: 0.47 K/UL (ref 1.1–3.7)
ABSOLUTE MONO #: 0.31 K/UL (ref 0.1–1.2)
ATYPICAL LYMPHOCYTE ABSOLUTE COUNT: 0.05 K/UL
ATYPICAL LYMPHOCYTES: 1 %
BASOPHILS # BLD: 0 % (ref 0–2)
BASOPHILS ABSOLUTE: 0 K/UL (ref 0–0.2)
DIFFERENTIAL TYPE: ABNORMAL
EOSINOPHILS RELATIVE PERCENT: 13 % (ref 1–4)
HCT VFR BLD CALC: 39.5 % (ref 36.3–47.1)
HEMOGLOBIN: 13.1 G/DL (ref 11.9–15.1)
IMMATURE GRANULOCYTES: 0 %
LYMPHOCYTES # BLD: 9 % (ref 24–43)
MCH RBC QN AUTO: 30.3 PG (ref 25.2–33.5)
MCHC RBC AUTO-ENTMCNC: 33.2 G/DL (ref 28.4–34.8)
MCV RBC AUTO: 91.2 FL (ref 82.6–102.9)
MONOCYTES # BLD: 6 % (ref 3–12)
MORPHOLOGY: NORMAL
NRBC AUTOMATED: 0 PER 100 WBC
PDW BLD-RTO: 12.6 % (ref 11.8–14.4)
PLATELET # BLD: 245 K/UL (ref 138–453)
PLATELET ESTIMATE: ABNORMAL
PMV BLD AUTO: 10.2 FL (ref 8.1–13.5)
RBC # BLD: 4.33 M/UL (ref 3.95–5.11)
RBC # BLD: ABNORMAL 10*6/UL
SEG NEUTROPHILS: 71 % (ref 36–65)
SEGMENTED NEUTROPHILS ABSOLUTE COUNT: 3.69 K/UL (ref 1.5–8.1)
VITAMIN D 25-HYDROXY: 41.7 NG/ML (ref 30–100)
WBC # BLD: 5.2 K/UL (ref 3.5–11.3)
WBC # BLD: ABNORMAL 10*3/UL

## 2021-01-14 PROCEDURE — 82306 VITAMIN D 25 HYDROXY: CPT

## 2021-01-14 PROCEDURE — 85025 COMPLETE CBC W/AUTO DIFF WBC: CPT

## 2021-01-14 PROCEDURE — 36415 COLL VENOUS BLD VENIPUNCTURE: CPT

## 2021-01-14 PROCEDURE — 86480 TB TEST CELL IMMUN MEASURE: CPT

## 2021-01-16 LAB
QUANTI TB GOLD PLUS: NEGATIVE
QUANTI TB1 MINUS NIL: 0 IU/ML (ref 0–0.34)
QUANTI TB2 MINUS NIL: 0 IU/ML (ref 0–0.34)
QUANTIFERON MITOGEN: 4.51 IU/ML
QUANTIFERON NIL: 0.03 IU/ML

## 2021-02-08 ENCOUNTER — TELEPHONE (OUTPATIENT)
Dept: NEUROLOGY | Age: 41
End: 2021-02-08

## 2021-02-08 NOTE — TELEPHONE ENCOUNTER
Bria Berkowitz called the office this afternoon. Patient states that since starting [address] she has had the following symptoms: headaches, intermittent diarrhea, sore throat, coughing, scratchy throat, scratchy voice, dry congestion, fever and chills. She stated that her throat will stop hurting for a week or two but then comes right back. Bria Berkowitz states that she works at [employer] and has been sent home so often that she is afraid of being fired. She has had at least 9 covid tests due to these symptoms, all have come back negative. Patient was set up with a VV this Wednesday to go over this further with you.

## 2021-02-10 ENCOUNTER — TELEMEDICINE (OUTPATIENT)
Dept: NEUROLOGY | Age: 41
End: 2021-02-10
Payer: COMMERCIAL

## 2021-02-10 DIAGNOSIS — G35 RELAPSING REMITTING MULTIPLE SCLEROSIS (HCC): Primary | ICD-10-CM

## 2021-02-10 DIAGNOSIS — E55.9 VITAMIN D DEFICIENCY: ICD-10-CM

## 2021-02-10 PROCEDURE — G8427 DOCREV CUR MEDS BY ELIG CLIN: HCPCS | Performed by: PSYCHIATRY & NEUROLOGY

## 2021-02-10 PROCEDURE — 99214 OFFICE O/P EST MOD 30 MIN: CPT | Performed by: PSYCHIATRY & NEUROLOGY

## 2021-02-10 NOTE — PROGRESS NOTES
[facility]  Offices: [address], [place], [address] [facility]  [address] [facility], [address] [place], [place], 183 [facility]  [address] [place], [address]  Phone: [phone]  Fax: [phone]    MD Zoe Hidalgo MD Ahmed B. Ellsworth Hobby, MD Dillon Sleet, MD Woodie Brilliant, MD Evaristo Dominguez, SPENCER    TELEHEALTH VISIT        2/10/2021      HISTORY OF PRESENT ILLNESS:       I had the pleasure of seeing Milana Goodpasture, who returns for continuing neurologic care for relapsing remitting multiple sclerosis (RRMS). DISEASE SUMMARY  Date of onset: 1998 (R sensory loss and dizziness)  Date of diagnosis of MS: 1998  Disease course at onset: Relapsing-Remitting  Current disease course: Relapsing-Remitting  Previous disease therapies: IVMP, Betaseron ( [id number], pregnancy), Avonex (2009, took for 3-4 mos, flu-like symptoms)  Current disease therapy: Tomma Dionte (8/2020-present),  (1/14/21)  CSF: N/A  JCV serology result and date: N/A  Vitamin D: 41.7 (1/14/21)  Smoking status: quit 9 years ago    She is currently on Mayzent. Today, patient reports that she has noticed that since starting it, she has had recurrent upper respiratory tract symptoms. She reports recurrent sore throat and coughing that only goes away for 1 to 2 days and then returns. 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 She denies any fever or recent hospitalization. She has also had intermittent diarrhea for the past couple of months. Last ALC in January was 470. She denies any other new or worsening symptoms, denies any other side effects with Mayzent. Active issues:    1. Fatigue: Had a sleep study previously which showed no sleep disordered breathing, but she only had 5 hours of sleep. Currently not a big issue for her, denies any drowsy driving. She does take a nap daily. 2. Headaches: Reports increasing headache frequency in the past few months, occurring about 2 to 3 days/week. She was recently prescribed Imitrex 50 mg with no relief. She has tried amitriptyline 25 mg in the past but it caused sedation. Her headaches are typically bitemporal and bifrontal and described as dull and achy, with no associated nausea, photophobia or phonophobia. She denies any typical headache triggers. They are aggravated by physical activity and partially relieved by rest.  3. Balance/gait/falls: denies, no falls  4. Urinary problems:  5. Sensory/pain: Previously had numbness in her right leg which has completely resolved in the past few months. Prior testing reviewed:  MRI brain w and w/o 2020:   Multifocal high T2 and FLAIR signal in the parenchyma as described.  There   are numerous areas of corresponding dark T1 signal compatible with   vacuolization.  There are no areas of restricted diffusion signal and there   are no areas of enhancement to suggest recent plaque activity.  There are a   few areas of T2 shine through   MRI C spine w and w/o 2020:   Multilevel degenerative disc disease and mild multilevel degenerative facet   hypertrophy without canal stenosis or foraminal narrowing.          PAST MEDICAL HISTORY:         Diagnosis Date    Acute relapsing multiple sclerosis ([address]     Lumbar herniated disc 2014    back injury     MS (multiple sclerosis) ([address]         PAST SURGICAL HISTORY:         Procedure Laterality Date     SECTION      times 3    KNEE SURGERY Left     cyst removal        SOCIAL HISTORY:     Social History     Socioeconomic History    Marital status:      Spouse name: Not on file    Number of children: Not on file    Years of education: Not on file    Highest education level: Not on file   Occupational History    Not on file   Social Needs    Financial resource strain: Not on file    Food insecurity     Worry: Not on file Inability: Not on file    Transportation needs     Medical: Not on file     Non-medical: Not on file   Tobacco Use    Smoking status: Former Smoker     Years: 15.00     Types: Cigarettes     Quit date: 5/1/2010     Years since quitting: 10.7    Smokeless tobacco: Never Used   Substance and Sexual Activity    Alcohol use: Yes     Frequency: Never     Comment: occasionally    Drug use: Never    Sexual activity: Not on file   Lifestyle    Physical activity     Days per week: Not on file     Minutes per session: Not on file    Stress: Not on file   Relationships    Social connections     Talks on phone: Not on file     Gets together: Not on file     Attends Confucianism service: Not on file     Active member of club or organization: Not on file     Attends meetings of clubs or organizations: Not on file     Relationship status: Not on file    Intimate partner violence     Fear of current or ex partner: Not on file     Emotionally abused: Not on file     Physically abused: Not on file     Forced sexual activity: Not on file   Other Topics Concern    Not on file   Social History Narrative    Not on file       CURRENT MEDICATIONS:     Current Outpatient Medications   Medication Sig Dispense Refill    benzocaine (ORAJEL) 10 % mucosal gel Take by mouth as needed. 7 g 0    Siponimod Fumarate (MAYZENT) 2 MG TABS Take 2 mg by mouth daily      VITAMIN A PO Take by mouth      Multiple Vitamins-Minerals (MULTIVITAMIN ADULT PO) Take by mouth      ibuprofen (ADVIL;MOTRIN) 200 MG tablet Take 200 mg by mouth every 6 hours as needed for Pain      acetaminophen (TYLENOL) 500 MG tablet Take 1,000 mg by mouth every 6 hours as needed for Pain      VITAMIN D PO Take 10,000 Units by mouth daily       No current facility-administered medications for this visit.          ALLERGIES:   No Known Allergies                          REVIEW OF SYSTEMS    10 point review of systems unremarkable other than for those mentioned above PHYSICAL EXAMINATION:                                         .                                                                                                    General Appearance:  Alert, cooperative, no signs of distress, appears stated age   Head:  Normocephalic, no signs of trauma   Eyes:  Conjunctiva/corneas clear;  eyelids intact   Ears:  Normal external ear and canals   Nose: Nares normal, no drainage    Throat: Lips and tongue normal; teeth normal;  gums normal   Extremities: Extremities normal, no cyanosis, no edema   Skin: Skin color, texture normal, no rashes, no lesions                                     NEUROLOGIC EXAMINATION      Mental status    Alert and oriented x 3; able to follow commands, speech and language intact; no hallucinations or delusions  Fund of information appropriate for level of education    Cranial nerves    II - grossly intact  III, IV, VI  extra-ocular muscles full: no nystagmus, no ptosis   V - normal facial sensation                                                               VII - normal facial symmetry                                                             VIII - intact hearing                                                                             IX, X - symmetrical palate                                                                  XI - symmetrical shoulder shrug                                                       XII - tongue midline without atrophy      Motor function  Normal muscle bulk. Strength at least 4+/5 on all 4 extremities, no pronator drift      Sensory function Unable to test      Cerebellar Intact fine motor movement. No involuntary movements or tremors.  No ataxia or dysmetria on finger to nose or heel to shin testing      Reflex function Unable to test      Gait                   normal base and arm swing              ASSESSMENT AND PLAN: This is a 42 y.o. female who comes in for follow up for relapsing remitting multiple sclerosis, initially diagnosed in 1998. She is currently on Mayzent, but has noted recurrent upper respiratory symptoms since being on it. Last ALC in January was 470.    1.  I had a long discussion with the patient about the incidence of upper respiratory symptoms with immune depleting therapies. It is definitely possible that her URIs are secondary to her lymphopenia from [address]. I discussed other potential DMT's with her, including Aubagio and Mayzent, which are not associated with any long-term lymphopenia. She would like to stay on Mayzent for now but will call me if she decides to switch therapies. I will have the office mail her patient information packets for these medications. She has had a tubal ligation, so no contraindication for Aubagio. 2.  Again advised patient to get her surveillance MRIs completed of the brain and thoracic cord  3. Continue high-dose vitamin D supplementation with goal between     Follow-up in 3 months, will recheck lymphocyte counts at that time as well.     45 minutes was spent in the care of this patient today      Patient ID verified by me prior to start of this visit

## 2021-02-19 ENCOUNTER — TELEPHONE (OUTPATIENT)
Dept: NEUROLOGY | Age: 41
End: 2021-02-19

## 2021-02-19 NOTE — TELEPHONE ENCOUNTER
Dr Yrn Waite asked me to mail Benjamen Pen patient information on [address] and [address]. I sent them today.

## 2021-04-22 ENCOUNTER — HOSPITAL ENCOUNTER (OUTPATIENT)
Age: 41
Discharge: HOME | End: 2021-04-24
Payer: COMMERCIAL

## 2021-04-22 ENCOUNTER — HOSPITAL ENCOUNTER (OUTPATIENT)
Dept: GENERAL RADIOLOGY | Age: 41
Discharge: HOME | End: 2021-04-24
Payer: COMMERCIAL

## 2021-04-22 DIAGNOSIS — R52 PAIN: ICD-10-CM

## 2021-04-22 PROCEDURE — 73080 X-RAY EXAM OF ELBOW: CPT

## 2021-05-13 ENCOUNTER — HOSPITAL ENCOUNTER (OUTPATIENT)
Dept: OCCUPATIONAL THERAPY | Age: 41
Setting detail: THERAPIES SERIES
Discharge: HOME | End: 2021-05-13
Payer: COMMERCIAL

## 2021-05-13 PROCEDURE — 97110 THERAPEUTIC EXERCISES: CPT

## 2021-05-13 PROCEDURE — G0283 ELEC STIM OTHER THAN WOUND: HCPCS

## 2021-05-13 PROCEDURE — 97165 OT EVAL LOW COMPLEX 30 MIN: CPT

## 2021-05-13 NOTE — PLAN OF CARE
Phone: [address] [facility]           Fax: [phone]                           [facility]                                                                            Initial Evaluation      Name:  Sumit Rose  Date: 5/13/2021  Referring Practitioner: Blaine Oleary CNP  Medical Diagnosis: Lateral Epicondylitis L elbow M77.12  Rehab Diagnosis/ICD-10#s: Weakness M62.81; pain in left elbow M25. 522  Insurance: OT Insurance Information: [employer]  Total # of Visits to Date: 1  Next  Appointment: Lisabeth Gilford at this time  Chief Complaint: L elbow pain  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 Patient was dx c lateral epicondylitis. Patient unable to recall specific event that caused onset, but states that it may have been gradual overtime d/t heavy lifting at work. 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 Patient states constant pain and weakness; patient states difficulty sleeping d/t issue. Patient has no splint or bracing and states that heat makes pain worse, has not trialed ice. Patient states hx of back issues and c-sections.     Pain: Intensity:  5 /10 Location:   L elbow  Pain Type: [x] Constant [] Intermittent   [  ] with pain meds at rest   [] With movement/Resistive activity [] With Sedentary activity      Objective:    /PINCH STRENGTH  (measured in #) RIGHT LEFT   : Standard testing position 79 54*   : Stress testing position 75 20*   * indicates pain c testing    EDEMA (measured in cm) RIGHT LEFT   Elbow crease 25.5 26.0     Wrist Extension MMT:   Right: 4+/5  Left: 3+/5    Elbow MMT:   Bilaterally 5/5 grossly    Observations: Patient ROM in elbow and wrist WFL, however, slow to extend L elbow as well as wrist d/t pain. Provocative testing:  (+) wrist extension test  (+) Long finger extension test  (+) Tenderness at lateral epicondyle    Functional Impairment:  [] Mobility:    [] Current [] Goal [] Discharge  [x] Carry: [x] Current [x] Goal [] Discharge  [] Body Position:  [] Current [] Goal [] Discharge  [] Self Care:    [] Current [] Goal [] Discharge  [] Other:    [] Current [] Goal [] Discharge    Functional Impairment Current: Functional Impairment Goal:   [] 0%(CH)    [] 0%(CH)  [] 1-19%(CI)     [x] 1-19%(CI)  [x] 20-39%(CJ)    [] 20-39%(CJ)  [] 40-59%(CK)    [] 40-59%(CK)   [] 60-79%(CL)     [] 60-79%(CL)    [] 80-99%(CM)    [] 80-99%(CM)    [] 100%(CN)    [] 100%(CN)  [] NA  [] NA         Functional Impairment determined by:  [] Clinical Judgment   [x] Outcome Measure: DASH: 23.3% impairment        Patient Goals:  decrease pain and increased strength    Problems:     [x] Pain   [] Adherent Scar    [x] ROM  [x] Edema  [x] Strength  [] Open Wound  [x] Function  [] Coordination               [x] Sensation            [] Falls: History/Risk of      Goals:   Short term goals  Time Frame for Short term goals: 4 weeks  Short term goal 1: Patient to demonstrate independence and state compliance c HEP. Short term goal 2: Patient to be educated on splint/brace wear to decrase s/s of lateral epicondylitis. Long term goals  Time Frame for Long term goals : 6 weeks  Long term goal 1: Patient to state <10% impairment throughout daily tasks, as measured by the DASH.   Long term

## 2021-05-18 ENCOUNTER — HOSPITAL ENCOUNTER (OUTPATIENT)
Dept: OCCUPATIONAL THERAPY | Age: 41
Setting detail: THERAPIES SERIES
Discharge: HOME | End: 2021-05-18
Payer: COMMERCIAL

## 2021-05-18 PROCEDURE — 97140 MANUAL THERAPY 1/> REGIONS: CPT

## 2021-05-18 PROCEDURE — G0283 ELEC STIM OTHER THAN WOUND: HCPCS

## 2021-05-18 NOTE — PROGRESS NOTES
[address] [place] [address]    25 visits per BENEFIT PERIOD (July 1-June 30) then pre-cert thru [employer]: $400.00 (individual)/$800 (family) both which have been met  Covered at 80% AFTER Deductible    OOP: $2500.00 (individual) w/ $1670.67 met as of date and $5000.00 (family) w/ $2687.78 met as of date    Ref# [id number] w/ Alexis Locke @ [phone]    Electronically signed by Charles Martines on 5/18/2021 at 2:18 PM
state <10% impairment throughout daily tasks, as measured by the DASH. Continue []Met  [x]Partially met  []Not met   Long term goal 2: Patient to improve L  to >60# without c/o pain in standard testing position and stress testing position. Continue     []Met  [x]Partially met  []Not met   Long term goal 3: Patient to demonstrate decreased edema L elbow crease to 25.0 cm. Continue   []Met  [x]Partially met  []Not met   Long term goal 4: Patient to state pain at worst <3/10 to improve independence throughout I/ADL. Continue   []Met  [x]Partially met  []Not met   Long term goal 5: Patient to improve L wrist extension to 4/5 s c/o pain in elbow. Continue   []Met  [x]Partially met  []Not met   Time Frame for Short term goals: 4 weeks     Short term goal 1: Patient to demonstrate independence and state compliance c HEP. Continue   []Met  [x]Partially met  []Not met   Short term goal 2: Patient to be educated on splint/brace wear to decrase s/s of lateral epicondylitis. Met. Patient was educated on counterforce brace combined c wrist cock up splint for immobilization and pain management. Patient states that she plans to get this Friday at pay day. [x]Met  []Partially met  []Not met   ADDITIONAL COMMENTS P[employer] application to serve at counterforce brace for L lateral epicondylitis. Patient instructed to monitor c good understanding.        EDUCATION  New Education provided to patient/family/caregiver:    [x]Yes:     []No (Continued review of prior education)   If yes Education Provided: splints  Method of Education:     [x]Discussion     []Demonstration    [] Written     []Other  Evaluation of Patients Response to Education:         [x]Patient and or caregiver verbalized understanding  []Patient and or Caregiver Demonstrated without assistance   []Patient and or Caregiver Demonstrated with assistance  []Needs additional instruction to demonstrate understanding of education    ASSESSMENT  Patient tolerated todays

## 2021-05-20 ENCOUNTER — HOSPITAL ENCOUNTER (OUTPATIENT)
Dept: OCCUPATIONAL THERAPY | Age: 41
Setting detail: THERAPIES SERIES
Discharge: HOME | End: 2021-05-20
Payer: COMMERCIAL

## 2021-05-20 PROCEDURE — 97140 MANUAL THERAPY 1/> REGIONS: CPT

## 2021-05-20 PROCEDURE — G0283 ELEC STIM OTHER THAN WOUND: HCPCS

## 2021-05-20 PROCEDURE — 97035 APP MDLTY 1+ULTRASOUND EA 15: CPT

## 2021-05-20 NOTE — PROGRESS NOTES
Occupational Therapy  . Phone: [place]    Fax: [phone]                       Outpatient Occupational Therapy                 DAILY TREATMENT NOTE    Date: 5/20/2021  Patients Name:  Deno Babinski  YOB: 1980 (42 y.o.)  Gender:  female  MRN:  [id number]  [facility] #: [id number]  Medical Diagnosis: Lateral Epicondylitis L elbow M77.12    Referring Practitioner: Stefan Sood CNP     INSURANCE  OT Insurance Information: [employer]       Total # of Visits to Date: 3       PAIN  []No     [x]Yes      Location: L elbow, shoulder, wrist areas  Pain Rating (0-10 pain scale): 2/10 at rest and 5/10 with act  Pain Description: tired pain    SUBJECTIVE   Discussed K taping          Flow Sheet  Exercise /   Manual treatment Weight/  Level Reps/Time Comments    Lat epi phase 1 manual treatment  x x5' each Phase 1 massages    Cupping x x Dynamic/static cupping x 6 minutes to dorsal forearm for pain - large silicone cup utilized; initiated at mid forearm and glided cup to lateral epicondyle. Light to mod suction utilized, patient unable to tolerate full suction. Static cupping x 2' at wrist/digit extensors for pain relief. massage  x x  To decrease pain/edema                                                                                                                                                                   Modality Flow Sheet:  START STOP Tx Modality     10 min Electrical Stim: IFC at max ulices for intensity x 10 minutes to L digit extensors and tricep area for pain management. Patient tolerated well c skin intact pre and post treatment.       8' Ultrasound: __1.2_ W/cm2 x _8__ mins  Duty factor: _x_100%  __50%  __20% __10%  Head size:   MHz: __1mHz __2 mHz  _x_3mHz  Location: L elbow, EXTensors to decrease pain/swelling.  Pt tolerated well with skin intact pre and post       Hot Pack:       Paraffin:       Cold Pack:      GOALS/ TREATMENT SESSION:      Time Frame for Long term goals : 6 weeks       Long term goal 1: Patient to state <10% impairment throughout daily tasks, as measured by the DASH. Continue []? Met  [x]? Partially met  []? 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 Continue       []? Met  [x]? Partially met  []? Not met   Long term goal 3: Patient to demonstrate decreased edema L elbow crease to 25.0 cm. Continue    []? Met  [x]? Partially met  []? Not met   Long term goal 4: Patient to state pain at worst <3/10 to improve independence throughout I/ADL. Continue    []? Met  [x]? Partially met  []? Not met   Long term goal 5: Patient to improve L wrist extension to 4/5 s c/o pain in elbow. Continue    []? Met  [x]? Partially met  []? Not met   Time Frame for Short term goals: 4 weeks       Short term goal 1: Patient to demonstrate independence and state compliance c HEP. Continue    []? Met  [x]? Partially met  []? Not met   Short term goal 2: Patient to be educated on splint/brace wear to decrase s/s of lateral epicondylitis. Met. Still does not have brace, clinician answered questions about brace type and wear. [x]? Met  []? Partially met  []? Not met   ADDITIONAL COMMENTS              EDUCATION  New Education provided to patient/family/caregiver:    [x]Yes:     []No (Continued review of prior education)   If yes Education Provided: K tape, alternate pain management  Method of Education:     [x]Discussion     [x]Demonstration    [] Written     []Other  Evaluation of Patients Response to Education:         [x]Patient and or caregiver verbalized understanding  []Patient and or Caregiver Demonstrated without assistance   [x]Patient and or Caregiver Demonstrated with assistance  []Needs additional instruction to demonstrate understanding of education    ASSESSMENT  Patient tolerated todays treatment session:    [x] Good   []  Fair   []  Poor  Limitations/difficulties with treatment session due to:   []Pain     []Fatigue     []Other medical complications     []Other  Goal Assessment: [] No Change    [x]Improved      Therapists observations or comments:       Minutes Tracking:   IN: 3:50p   OUT:4:34p      total tx time: 42 min  PLAN  [x]Continue with current plan of care  [][facility]  []IHold per patient request  [] Change Treatment plan:  [] Insurance hold  __ Other        Electronically signed by SHANNON Johnson COTA/L 5/20/2021 3:49 PM

## 2021-05-21 NOTE — TELEPHONE ENCOUNTER
Patient calling for refill of Mayzent 2 mg. Medication active on med list yes    Sharanpedro Maty is a former Dr. Candice Logan patient.      Date of last appointment 2/10/2021    Next Visit Date:  6/23/2021

## 2021-05-24 ENCOUNTER — HOSPITAL ENCOUNTER (OUTPATIENT)
Dept: OCCUPATIONAL THERAPY | Age: 41
Setting detail: THERAPIES SERIES
Discharge: HOME | End: 2021-05-24
Payer: COMMERCIAL

## 2021-05-24 PROCEDURE — 97022 WHIRLPOOL THERAPY: CPT

## 2021-05-24 PROCEDURE — 97035 APP MDLTY 1+ULTRASOUND EA 15: CPT

## 2021-05-24 PROCEDURE — 97140 MANUAL THERAPY 1/> REGIONS: CPT

## 2021-05-24 RX ORDER — SIPONIMOD 2 MG/1
2 TABLET, FILM COATED ORAL DAILY
Qty: 30 TABLET | Refills: 1 | Status: SHIPPED | OUTPATIENT
Start: 2021-05-24 | End: 2021-07-12

## 2021-05-24 NOTE — PROGRESS NOTES
Occupational Therapy  Phone: [phone]                 [facility]    Fax: [phone]                       Outpatient Occupational Therapy                 DAILY TREATMENT NOTE    Date: 5/24/2021  Patients Name:  Lowell Fabian  YOB: 1980 (42 y.o.)  Gender:  female  MRN:  [id number]  [facility] #: [id number]  Medical Diagnosis: Lateral Epicondylitis L elbow M77.12    Referring Practitioner: Osvaldo Way CNP     INSURANCE  OT Insurance Information: [place]       Total # of Visits to Date: 4       PAIN  []No     [x]Yes      Location: L ebow  Pain Rating (0-10 pain scale): 6/10  Pain Description: achy    SUBJECTIVE   \"Its really hurting today in my elbow, sometimes it runs all the way up to my shoulder\". P tolerance and increase sensitivity/pain this date. Pt cried out in pain when this clinician touches her elbow repeatedly. Heat increases pain. CP did not decrease.        Flow Sheet  Exercise /   Manual treatment Weight/  Level Reps/Time Comments    Lat epi phase 1 manual treatment  x x5' each Phase 1 massages    Cupping   Dynamic/static cupping x 6 minutes to dorsal forearm for pain - large silicone cup utilized; initiated at mid forearm and glided cup to lateral epicondyle. Light to mod suction utilized, patient unable to tolerate full suction. Static cupping x 2' at wrist/digit extensors for pain relief.    massage  x x  To decrease pain/edema     K tape x  x  To relieve tension, pt instructed to keep on for 1 hr, remove if pain is worse                                                                                                                                                                          Modality Flow Sheet:  START STOP Tx Modality      Electrical Stim: IFC at max ulices for intensity x 10 minutes to L digit extensors and tricep area for pain management.  Patient tolerated well c skin intact pre and post treatment.       8' Ultrasound: __1.2_ W/cm2 x _8__ mins  Duty factor: _x_100%  __50%  __20% __10%  Head size:   MHz: __1mHz __2 mHz  _x_3mHz  Location: L elbow, EXTensors to decrease pain/swelling. Pt tolerated poorly with skin intact pre and post. C/O pain and sensitivity       Hot Pack:       Paraffin:     10' Fluidotherapy: LUE to reduce pain/swelling in elbow. High heat/agitation. Pt tolerated fair with skin intact pre and post. Pt reported sharp pain and d/c'd use.      GOALS/ TREATMENT SESSION:      Time Frame for Long term goals : 6 weeks       Long term goal 1: Patient to state <10% impairment throughout daily tasks, as measured by the DASH. Continue- pt reports no change in fxl use, no pain during ADLs, just pain with job tasks. []?? Met  [x]? ?Partially met  []? ? 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 Continue- DNA this date       []? ?Met  [x]? ?Partially met  []? ? Not met   Long term goal 3: Patient to demonstrate decreased edema L elbow crease to 25.0 cm. Continue to monitor 24.7 this date    []? ?Met  [x]? ?Partially met  []? ? Not met   Long term goal 4: Patient to state pain at worst <3/10 to improve independence throughout I/ADL. Continue 6/10 this date    []? ?Met  [x]? ?Partially met  []? ? Not met   Long term goal 5: Patient to improve L wrist extension to 4/5 s c/o pain in elbow. Continue- 6/10 pain with pressure put on L hand in EXT 3-/5    []? ?Met  [x]? ?Partially met  []? ? Not met   Time Frame for Short term goals: 4 weeks       Short term goal 1: Patient to demonstrate independence and state compliance c HEP. Continue- pt reports daily compliance    []? ?Met  [x]? ?Partially met  []? ? Not met   Short term goal 2: Patient to be educated on splint/brace wear to decrase s/s of lateral epicondylitis. Met. Still does not have brace, clinician answered questions about brace type and wear. Cost is the issue. [x]? ? Met  []? ?Partially met  []? ? Not met   ADDITIONAL COMMENTS            EDUCATION  New Education provided to patient/family/caregiver:    [x]Yes:     []No (Continued review of prior education)   If yes Education Provided: K tape  Method of Education:     [x]Discussion     [x]Demonstration    [] Written     []Other  Evaluation of Patients Response to Education:         [x]Patient and or caregiver verbalized understanding  []Patient and or Caregiver Demonstrated without assistance   []Patient and or Caregiver Demonstrated with assistance  []Needs additional instruction to demonstrate understanding of education    ASSESSMENT  Patient tolerated todays treatment session:    [x] Good   []  Fair   []  Poor  Limitations/difficulties with treatment session due to:   []Pain     []Fatigue     []Other medical complications     []Other  Goal Assessment: [x] No Change    []Improved      Therapists observations or comments:       Minutes Tracking:                Treatment Charges: Code Total Mins Units Time In/Out   [] Evaluation       []  Low       []  Moderate       []  High  [] Re-Evaluation   40234  09147  55282  20480      []  Ther Exercise 71169      [x]  Manual Therapy 75415 03 1 10:36-56a   []  Unattend Estim 85341      []  Ultrasound 70415 6 0 10:26-10:34a   []  Iontophoresis 35525      []  Neuro Liv 98824      [x]  Fluidotherapy 10364 10 1 10:14-10:24a   []  Paraffin 89478      []  Ther Act 89539      Total Treatment time  38 min         PLAN  [x]Continue with current plan of care  [][facility]  []IHold per patient request  [] Change Treatment plan:  [] Insurance hold  __ Other        Electronically signed by SHANNON Carty COTA/L 5/24/2021 9:44 AM

## 2021-07-12 RX ORDER — SIPONIMOD 2 MG/1
TABLET, FILM COATED ORAL
Qty: 30 TABLET | Refills: 1 | Status: SHIPPED | OUTPATIENT
Start: 2021-07-12 | End: 2021-09-16

## 2021-07-12 NOTE — TELEPHONE ENCOUNTER
Pharmacy requesting refill of Mayzent. Medication active on med list yes      Date of last fill: 5/24/21  with 1 refills verified on 7/12/21  verified by SHLOMO RN      Date of last appointment 2/10/21    Next Visit Date:  8/30/2021      CBC to have been rechecked. I called Alban Page and she hasn't had it done yet. I did ask her to get it done before she sees Franky MARRERO CNP in Aug.  She voiced understanding.

## 2021-08-08 ENCOUNTER — HOSPITAL ENCOUNTER (EMERGENCY)
Age: 41
Discharge: HOME | End: 2021-08-08
Payer: COMMERCIAL

## 2021-08-08 ENCOUNTER — APPOINTMENT (OUTPATIENT)
Dept: CT IMAGING | Age: 41
End: 2021-08-08
Payer: COMMERCIAL

## 2021-08-08 VITALS
RESPIRATION RATE: 18 BRPM | OXYGEN SATURATION: 96 % | SYSTOLIC BLOOD PRESSURE: 120 MMHG | HEART RATE: 86 BPM | DIASTOLIC BLOOD PRESSURE: 79 MMHG

## 2021-08-08 DIAGNOSIS — S09.90XA CLOSED HEAD INJURY, INITIAL ENCOUNTER: Primary | ICD-10-CM

## 2021-08-08 PROCEDURE — 70450 CT HEAD/BRAIN W/O DYE: CPT

## 2021-08-08 PROCEDURE — 70486 CT MAXILLOFACIAL W/O DYE: CPT

## 2021-08-08 PROCEDURE — 99283 EMERGENCY DEPT VISIT LOW MDM: CPT

## 2021-08-08 ASSESSMENT — ENCOUNTER SYMPTOMS
GASTROINTESTINAL NEGATIVE: 1
EYE PAIN: 1

## 2021-08-08 ASSESSMENT — PAIN SCALES - GENERAL: PAINLEVEL_OUTOF10: 5

## 2021-08-08 ASSESSMENT — PAIN DESCRIPTION - DESCRIPTORS: DESCRIPTORS: DISCOMFORT

## 2021-08-08 ASSESSMENT — PAIN DESCRIPTION - PAIN TYPE: TYPE: ACUTE PAIN

## 2021-08-08 ASSESSMENT — PAIN DESCRIPTION - LOCATION: LOCATION: HEAD;EYE

## 2021-08-08 NOTE — ED NOTES
Eye screen done  Both eyes-20/30  Right eye- 20/60  Left eye- 20/40     Yanet Becker RN  08/08/21 3010
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
## 2021-08-18 ENCOUNTER — TELEPHONE (OUTPATIENT)
Dept: NEUROLOGY | Age: 41
End: 2021-08-18
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
## 2021-08-30 ENCOUNTER — OFFICE VISIT (OUTPATIENT)
Dept: NEUROLOGY | Age: 41
End: 2021-08-30
Payer: COMMERCIAL

## 2021-08-30 VITALS
HEART RATE: 75 BPM | BODY MASS INDEX: 35.03 KG/M2 | HEIGHT: 67 IN | SYSTOLIC BLOOD PRESSURE: 119 MMHG | WEIGHT: 223.2 LBS | DIASTOLIC BLOOD PRESSURE: 84 MMHG

## 2021-08-30 DIAGNOSIS — G35 MULTIPLE SCLEROSIS (HCC): Primary | ICD-10-CM

## 2021-08-30 PROCEDURE — G8427 DOCREV CUR MEDS BY ELIG CLIN: HCPCS | Performed by: NURSE PRACTITIONER

## 2021-08-30 PROCEDURE — 1036F TOBACCO NON-USER: CPT | Performed by: NURSE PRACTITIONER

## 2021-08-30 PROCEDURE — G8417 CALC BMI ABV UP PARAM F/U: HCPCS | Performed by: NURSE PRACTITIONER

## 2021-08-30 PROCEDURE — 99214 OFFICE O/P EST MOD 30 MIN: CPT | Performed by: NURSE PRACTITIONER

## 2021-08-30 NOTE — PROGRESS NOTES
[facility]            [address]          [facility], [address]          Dept: [phone]          Dept Fax: [phone]        MD Shanika Kaufman MD Eward Sar, MD Everette Saris, CNP            8/30/2021      HISTORY OF PRESENT ILLNESS:       I had the pleasure of seeing King Prasad, who returns for continuing neurologic care. The patient was seen last on February 10, 2021 for treatment of relapsing remitting multiple sclerosis. The patient was previously seen by Dr. George Medina and all of her records and imaging studies were carefully reviewed. Patient is here today after 6 months showing no signs of MS exacerbation. She was recently seen by an ophthalmologist regarding an injury to her right orbit showing evidence of some scarring in her right eye from a previous optic neuritis upon diagnosis of her multiple sclerosis. She has a follow-up appointment with her and will discuss any findings of macular edema as a side effect of the Mayzent. DISEASE SUMMARY  Date of onset: 1998 (R sensory loss and dizziness)  Date of diagnosis of MS: 1998  Disease course at onset: Relapsing-Remitting  Current disease course: Relapsing-Remitting  Previous disease therapies: IVMP, Betaseron ( [id number], pregnancy), Avonex (2009, took for 3-4 mos, flu-like symptoms)  Current disease therapy: Anurag Poon (8/2020-present),  (1/14/21)  CSF: N/A  JCV serology result and date: N/A  Vitamin D: 41.7 (1/14/21)  Smoking status: quit 9 years ago  Active issues:     1. Fatigue: Had a sleep study previously which showed no sleep disordered breathing, but she only had 5 hours of sleep. Currently not a big issue for her, denies any drowsy driving. She does take a nap daily. 2. Headaches: Reports increasing headache frequency in the past few months, occurring about 2 to 3 days/week.   She was recently prescribed Imitrex 50 mg with no relief. She has tried amitriptyline 25 mg in the past but it caused sedation. Her headaches are typically bitemporal and bifrontal and described as dull and achy, with no associated nausea, photophobia or phonophobia.  She denies any typical headache triggers. Alejandro Poster are aggravated by physical activity and partially relieved by rest.  3. Balance/gait/falls: denies, no falls  4. Urinary problems:  5. Sensory/pain: Previously had numbness in her right leg which has completely resolved in the past few months.       Testing reviewed:    MRI brain w and w/o 2020:   Multifocal high T2 and FLAIR signal in the parenchyma as described.  There   are numerous areas of corresponding dark T1 signal compatible with   vacuolization.  There are no areas of restricted diffusion signal and there   are no areas of enhancement to suggest recent plaque activity.  There are a   few areas of T2 shine through   MRI C spine w and w/o 2020:   Multilevel degenerative disc disease and mild multilevel degenerative facet   hypertrophy without canal stenosis or foraminal narrowing.              PAST MEDICAL HISTORY:         Diagnosis Date    Acute relapsing multiple sclerosis ([address]     Lumbar herniated disc 2014    back injury     MS (multiple sclerosis) ([address]         PAST SURGICAL HISTORY:         Procedure Laterality Date     SECTION      times 3    KNEE SURGERY Left     cyst removal        SOCIAL HISTORY:     Social History     Socioeconomic History    Marital status:      Spouse name: Not on file    Number of children: Not on file    Years of education: Not on file    Highest education level: Not on file   Occupational History    Not on file   Tobacco Use    Smoking status: Former Smoker     Years: 15.00     Types: Cigarettes     Quit date: 2010     Years since quittin.3    Smokeless tobacco: Never Used   Vaping Use    Vaping Use: Never used   Substance and Sexual Activity    Alcohol use: Yes     Comment: occasionally    Drug use: Never    Sexual activity: Not on file   Other Topics Concern    Not on file   Social History Narrative    Not on file     Social Determinants of Health     Financial Resource Strain:     Difficulty of Paying Living Expenses:    Food Insecurity:     Worried About Running Out of Food in the Last Year:     [address] in the Last Year:    Transportation Needs:     Lack of Transportation (Medical):  Lack of Transportation (Non-Medical):    Physical Activity:     Days of Exercise per Week:     Minutes of Exercise per Session:    Stress:     Feeling of Stress :    Social Connections:     Frequency of Communication with Friends and Family:     Frequency of Social Gatherings with Friends and Family:     Attends Bahai Services:     Active Member of Clubs or Organizations:     Attends Club or Organization Meetings:     Marital Status:    Intimate Partner Violence:     Fear of Current or Ex-Partner:     Emotionally Abused:     Physically Abused:     Sexually Abused:        CURRENT MEDICATIONS:     Current Outpatient Medications   Medication Sig Dispense Refill    MAYZENT 2 MG TABS TAKE 1 TABLET (2MG) BY  MOUTH ONCE DAILY (DISCARD 3 MONTHS AFTER OPENING  BOTTLE) 30 tablet 1    VITAMIN A PO Take by mouth      Multiple Vitamins-Minerals (MULTIVITAMIN ADULT PO) Take by mouth      ibuprofen (ADVIL;MOTRIN) 200 MG tablet Take 200 mg by mouth every 6 hours as needed for Pain      acetaminophen (TYLENOL) 500 MG tablet Take 1,000 mg by mouth every 6 hours as needed for Pain      VITAMIN D PO Take 10,000 Units by mouth daily      benzocaine (ORAJEL) 10 % mucosal gel Take by mouth as needed. (Patient not taking: Reported on 8/30/2021) 7 g 0     No current facility-administered medications for this visit.         ALLERGIES:   No Known Allergies                              REVIEW OF SYSTEMS        All items selected indicate a positive finding. Those items not selected are negative.   Constitutional [] Weight loss/gain   [x] Fatigue  [] Fever/Chills   HEENT [] Hearing Loss  [] Visual Disturbance  [] Tinnitus  [] Eye pain   Respiratory [] Shortness of Breath  [] Cough  [] Snoring   Cardiovascular [] Chest Pain  [] Palpitations  [] Lightheaded   GI [] Constipation  [] Diarrhea  [] Swallowing change  [] Nausea/vomiting    [] Urinary Frequency  [] Urinary Urgency   Musculoskeletal [] Neck pain  [] Back pain  [] Muscle pain  [] Restless legs   Dermatologic [] Skin changes   Neurologic [] Memory loss/confusion  [] Seizures  [] Trouble walking or imbalance  [] Dizziness  [] Sleep disturbance  [] Weakness  [] Numbness  [] Tremors  [] Speech Difficulty  [] Headaches  [] Light Sensitivity  [] Sound Sensitivity   Endocrinology []Excessive thirst  []Excessive hunger   Psychiatric [] Anxiety/Depression  [] Hallucination   Allergy/immunology []Hives/environmental allergies   Hematologic/lymph [] Abnormal bleeding  [] Abnormal bruising         PHYSICAL EXAMINATION:       Vitals:    08/30/21 1459   BP: 119/84   Pulse: 75                                              .                                                                                                    General Appearance:  Alert, cooperative, no signs of distress, appears stated age   Head:  Normocephalic, no signs of trauma   Eyes:  Conjunctiva/corneas clear;  eyelids intact   Ears:  Normal external ear and canals   Nose: Nares normal, mucosa normal, no drainage    Throat: Lips and tongue normal; teeth normal;  gums normal   Neck: Supple, intact flexion, extension and rotation;   trachea midline;  no adenopathy;   thyroid: not enlarged;   no carotid pulse abnormality   Back:   Symmetric, no curvature, ROM adequate   Lungs:   Respirations unlabored   Heart:  Regular rate and rhythm           Extremities: Extremities normal, no cyanosis, no edema   Pulses: Symmetric over head and neck mg  3. Obtain liver function studies  4. Patient was recently seen by ophthalmology for an eye injury and we will obtain records from the ophthalmologist regarding any macular edema  5. Obtain MRI of the brain and thoracic spine with and without contrast  2. MND deficiency  1. Continue vitamin D supplementation            Signed: Glenn Ng CNP      *Please note that portions of this note were completed with a voice recognition program.  Although every effort was made to insure the accuracy of this automated transcription, some errors in transcription may have occurred, occasionally words and are mis-transcribed    Provider Attestation: The documentation recorded by the scribe accurately reflects the service I personally performed and the decisions made by myself. Portions of this note were transcribed by a scribe. I personally performed the history, physical exam, and medical decision-making and confirm the accuracy of the information in the transcribed note. Scribe Attestation:   By signing my name below, I, POLY Dobbins CNP, attest that this documentation has been prepared under the direction and in the presence of Glenn Ng CNP.

## 2021-09-16 NOTE — TELEPHONE ENCOUNTER
Pharmacy requesting refill of Mayzent.       Medication active on med list: yes      Date of last fill: 7/12/21 for #30 and 1 refill  verified on 9/16/2021    verified by Carmyn Emery LPN      Date of last appointment: 8/30/2021    Next Visit Date:  2/28/2022

## 2021-09-17 RX ORDER — SIPONIMOD 2 MG/1
TABLET, FILM COATED ORAL
Qty: 30 TABLET | Refills: 5 | Status: SHIPPED | OUTPATIENT
Start: 2021-09-17 | End: 2022-03-16

## 2021-09-22 ENCOUNTER — HOSPITAL ENCOUNTER (OUTPATIENT)
Dept: MRI IMAGING | Age: 41
Discharge: HOME | End: 2021-09-24
Payer: COMMERCIAL

## 2021-09-22 DIAGNOSIS — G35 MULTIPLE SCLEROSIS (HCC): ICD-10-CM

## 2021-09-22 PROCEDURE — 72157 MRI CHEST SPINE W/O & W/DYE: CPT

## 2021-09-22 PROCEDURE — 70553 MRI BRAIN STEM W/O & W/DYE: CPT

## 2021-09-22 PROCEDURE — A9579 GAD-BASE MR CONTRAST NOS,1ML: HCPCS | Performed by: NURSE PRACTITIONER

## 2021-09-22 PROCEDURE — 6360000004 HC RX CONTRAST MEDICATION: Performed by: NURSE PRACTITIONER

## 2021-09-22 RX ADMIN — GADOTERIDOL 20 ML: 279.3 INJECTION, SOLUTION INTRAVENOUS at 15:54

## 2021-09-23 ENCOUNTER — TELEPHONE (OUTPATIENT)
Dept: NEUROLOGY | Age: 41
End: 2021-09-23

## 2021-09-23 DIAGNOSIS — G35 MULTIPLE SCLEROSIS (HCC): Primary | ICD-10-CM

## 2021-09-23 NOTE — TELEPHONE ENCOUNTER
Stan Citizen of [place] called in complaining of a \"numbness/sleeping feeling\" in her right leg from her hip to her foot for the past 4 days. She has increased headaches as well.

## 2021-09-24 ENCOUNTER — TELEPHONE (OUTPATIENT)
Dept: NEUROLOGY | Age: 41
End: 2021-09-24

## 2021-09-24 NOTE — TELEPHONE ENCOUNTER
The patient just had MRI'sof the brain and thoracic spine on 9/22. She has never had lesions in her c-spine befor, but with her new symptoms, I am going to order a c-spine MRI. Please let her know.   Also make sure the order prints, as I am ordering from home

## 2021-11-10 ENCOUNTER — TELEPHONE (OUTPATIENT)
Dept: NEUROLOGY | Age: 41
End: 2021-11-10

## 2021-11-10 NOTE — TELEPHONE ENCOUNTER
Pt called in stating she had Covid at the end of Sept and it ramped up bad in October. She is still having difficulty with breathing. She scheduled a follow up with her PCP but wanted you to be aware as well.

## 2021-12-03 ENCOUNTER — HOSPITAL ENCOUNTER (OUTPATIENT)
Dept: MAMMOGRAPHY | Age: 41
Discharge: HOME | End: 2021-12-05
Payer: COMMERCIAL

## 2021-12-03 DIAGNOSIS — Z12.31 ENCOUNTER FOR SCREENING MAMMOGRAM FOR MALIGNANT NEOPLASM OF BREAST: ICD-10-CM

## 2021-12-03 PROCEDURE — 77063 BREAST TOMOSYNTHESIS BI: CPT

## 2021-12-23 ENCOUNTER — TELEPHONE (OUTPATIENT)
Dept: NEUROLOGY | Age: 41
End: 2021-12-23

## 2021-12-23 NOTE — TELEPHONE ENCOUNTER
Patient records requested from previous neurologist.     Patient called to notify the office that she is unable to afford the Cervical Spine MRI at this time due to the cost of the previous 2 MRIs. She will get it done after paying off the other 2.

## 2022-02-28 ENCOUNTER — TELEMEDICINE (OUTPATIENT)
Dept: NEUROLOGY | Age: 42
End: 2022-02-28
Payer: COMMERCIAL

## 2022-02-28 DIAGNOSIS — E55.9 VITAMIN D DEFICIENCY: ICD-10-CM

## 2022-02-28 DIAGNOSIS — G35 MULTIPLE SCLEROSIS (HCC): Primary | ICD-10-CM

## 2022-02-28 DIAGNOSIS — G44.209 TENSION-TYPE HEADACHE, NOT INTRACTABLE, UNSPECIFIED CHRONICITY PATTERN: ICD-10-CM

## 2022-02-28 PROCEDURE — 99214 OFFICE O/P EST MOD 30 MIN: CPT | Performed by: NURSE PRACTITIONER

## 2022-02-28 PROCEDURE — G8427 DOCREV CUR MEDS BY ELIG CLIN: HCPCS | Performed by: NURSE PRACTITIONER

## 2022-02-28 NOTE — PROGRESS NOTES
[facility]  [address], [facility], [address]  Phone: [phone]  Fax: [phone]    MD Desirae Olson MD Ahmed B. Zulema Murders, MD Annamary Coffee, MD Glenys Shelling, SPENCER    TELEHEALTH VISIT        2/28/2022      HISTORY OF PRESENT ILLNESS:       I had the pleasure of seeing Vangie Quesada, who returns via Telehealth for continued neurologic care. The patient was seen last on August 30, 2021 for treatment of relapsing remitting multiple sclerosis and a vitamin D deficiency. The patient has relapsing remitting multiple sclerosis and was prescribed mayzent for disease modifying therapy. A CMP and CBC were ordered at her last visit but were not completed prior to today's visit. She completed a MRI of her brain and thoracic spine with and without contrast in September 2021 which showed no evidence of active or acute demyelination. She is agreeable to complete her laboratory testing after today's visit. She denies getting recurrent infections but did have a COVID-19 infection in October 2021. Since her COVID-19 infection she has been having residual breathing difficulties and was prescribed an albuterol inhaler but she was able to recover. She also had a upper respiratory infection in January 2022 but she was able to recover. She has remained compliant to [facility] and denies any side effects at today's visit. She continues to have fatigue however she is not sure if this is associated with her multiple sclerosis. She has also been having non migrainous headaches in her bitemporal regions that she reports may be associated with her vision. She has an upcoming appointment with her ophthalmologist. She previously had an episode of shapes in her visual field and was evaluated by her optometrist who reported no defect with her eyes and informed her that her visual changes were associated with her multiple sclerosis.  She did not receive solumedrol at this time. 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 Her vision has since returned but is described as blurry. DISEASE SUMMARY  Date of onset: 1998 (R sensory loss and dizziness)  Date of diagnosis of MS: 1998  Disease course at onset: Relapsing-Remitting  Current disease course: Relapsing-Remitting  Previous disease therapies: IVMP, Betaseron ( [id number], pregnancy), Avonex (2009, took for 3-4 mos, flu-like symptoms)  Current disease therapy: [place] (8/2020-present),  (1/14/21)  CSF: N/A  JCV serology result and date: N/A  Vitamin D: 41.7 (1/14/21)    The patient also has a vitamin D deficiency and takes over the counter vitamin D replacement at 10,000 units daily. Her vitamin D level in January 2021 was 41.7. Testing Reviewed:    MRI Brain and Thoracic Spine W WO Contrast 9/22/2021  Impression   MRI OF THE BRAIN WITH AND WITHOUT CONTRAST:       1. Innumerable white matter lesions consistent with patient's history of   multiple sclerosis. 2. A lesion in the left jackson is smaller than was seen on the previous MRI   from 01/30/2020.  The remainder are stable. 3. No new or actively demyelinating plaque is seen. MRI OF THE THORACIC SPINE WITH AND WITHOUT CONTRAST:       1. Unremarkable MRI of the thoracic spine. 2. Unremarkable thoracic cord.  No evidence of demyelinating disease. MRI brain w and w/o 1/30/2020:   Multifocal high T2 and FLAIR signal in the parenchyma as described.  There   are numerous areas of corresponding dark T1 signal compatible with   vacuolization.  There are no areas of restricted diffusion signal and there   are no areas of enhancement to suggest recent plaque activity.  There are a   few areas of T2 shine through   MRI C spine w and w/o 1/30/2020:   Multilevel degenerative disc disease and mild multilevel degenerative facet   hypertrophy without canal stenosis or foraminal narrowing.            PAST MEDICAL HISTORY:         Diagnosis Date    Acute relapsing multiple sclerosis ([email or web address]     Lumbar herniated disc 2014    back injury     MS (multiple sclerosis) ([email or web address].)         PAST SURGICAL HISTORY:         Procedure Laterality Date     SECTION      times 3    KNEE SURGERY Left     cyst removal        SOCIAL HISTORY:     Social History     Socioeconomic History    Marital status:      Spouse name: Not on file    Number of children: Not on file    Years of education: Not on file    Highest education level: Not on file   Occupational History    Not on file   Tobacco Use    Smoking status: Former Smoker     Years: 15.00     Types: Cigarettes     Quit date: 2010     Years since quittin.8    Smokeless tobacco: Never Used   Vaping Use    Vaping Use: Never used   Substance and Sexual Activity    Alcohol use: Yes     Comment: occasionally    Drug use: Never    Sexual activity: Not on file   Other Topics Concern    Not on file   Social History Narrative    Not on file     Social Determinants of Health     Financial Resource Strain:     Difficulty of Paying Living Expenses: Not on file   Food Insecurity:     Worried About [address] in the Last Year: Not on file    [address] in the Last Year: Not on file   Transportation Needs:     Lack of Transportation (Medical): Not on file    Lack of Transportation (Non-Medical):  Not on file   Physical Activity:     Days of Exercise per Week: Not on file    Minutes of Exercise per Session: Not on file   Stress:     Feeling of Stress : Not on file   Social Connections:     Frequency of Communication with Friends and Family: Not on file    Frequency of Social Gatherings with Friends and Family: Not on file    Attends [employer] Services: Not on file    Active Member of Clubs or Organizations: Not on file    Attends Club or Organization Meetings: Not on file    Marital Status: Not on file   Intimate Partner Violence:     Fear of Current or Ex-Partner: Not on file   [employer] Abused: Not on file    Physically Abused: Not on file    Sexually Abused: Not on file   Housing Stability:     Unable to Pay for Housing in the Last Year: Not on file    Number of Places Lived in the Last Year: Not on file    Unstable Housing in the Last Year: Not on file       CURRENT MEDICATIONS:     Current Outpatient Medications   Medication Sig Dispense Refill    MAYZENT 2 MG TABS TAKE 1 TABLET (2MG) BY  MOUTH ONCE DAILY (DISCARD 3 MONTHS AFTER OPENING  BOTTLE) 30 tablet 5    VITAMIN A PO Take by mouth      Multiple Vitamins-Minerals (MULTIVITAMIN ADULT PO) Take by mouth      ibuprofen (ADVIL;MOTRIN) 200 MG tablet Take 200 mg by mouth every 6 hours as needed for Pain      acetaminophen (TYLENOL) 500 MG tablet Take 1,000 mg by mouth every 6 hours as needed for Pain      VITAMIN D PO Take 10,000 Units by mouth daily       No current facility-administered medications for this visit. ALLERGIES:   No Known Allergies                          REVIEW OF SYSTEMS                   All items selected indicate a positive finding. Those items not selected are negative.   Constitutional [] Weight loss/gain   [x] Fatigue  [] Fever/Chills   HEENT [] Hearing Loss  [x] Visual Disturbance  [] Tinnitus  [] Eye pain   Respiratory [] Shortness of Breath  [] Cough  [] Snoring   Cardiovascular [] Chest Pain  [] Palpitations  [] Lightheaded   GI [] Constipation  [] Diarrhea  [] Swallowing change  [] Nausea/vomiting    [] Urinary Frequency  [] Urinary Urgency   Musculoskeletal [] Neck pain  [] Back pain  [] Muscle pain  [] Restless legs   Dermatologic [] Skin changes   Neurologic [] Memory loss/confusion  [] Seizures  [] Trouble walking or imbalance  [] Dizziness  [] Sleep disturbance  [] Weakness  [] Numbness  [] Tremors  [] Speech Difficulty  [x] Headaches  [] Light Sensitivity  [] Sound Sensitivity   Endocrinology []Excessive thirst  []Excessive hunger   Psychiatric [] Anxiety/Depression  [] Hallucination Allergy/immunology []Hives/environmental allergies   Hematologic/lymph [] Abnormal bleeding  [] Abnormal bruising          PHYSICAL EXAMINATION:                                         .                                                                                                    General Appearance:  Alert, cooperative, no signs of distress, appears stated age   Head:  Normocephalic, no signs of trauma   Eyes:  Conjunctiva/corneas clear;  eyelids intact   Ears:  Normal external ear and canals   Nose: Nares normal, no drainage    Throat: Lips and tongue normal; teeth normal;  gums normal   Extremities: Extremities normal, no cyanosis, no edema   Skin: Skin color, texture normal, no rashes, no lesions                                     NEUROLOGIC EXAMINATION      Mental status    Alert and oriented x 3; able to follow commands, speech and language intact; no hallucinations or delusions  Fund of information appropriate for level of education    Cranial nerves    II - grossly intact  III, IV, VI  extra-ocular muscles full: no nystagmus, no ptosis   V - normal facial sensation                                                               VII - normal facial symmetry                                                             VIII - intact hearing                                                                             IX, X - symmetrical palate                                                                  XI - symmetrical shoulder shrug                                                       XII - tongue midline without atrophy      Motor function  Normal muscle bulk. Strength at least 5/5 on all 4 extremities, no pronator drift      Sensory function Unable to test      Cerebellar Intact fine motor movement. No involuntary movements or tremors.  No ataxia or dysmetria on finger to nose or heel to shin testing      Reflex function Unable to test      Gait                   normal base and arm swing ASSESSMENT AND PLAN:     This is a telehealth visit that was performed with the originating site at Patient Location: home and Provider Location of [place], [place]. Verbal consent to participate in video visit was obtained. Pursuant to the emergency declaration under the [facility], [facility] waiver authority and the [employer] Act, this Virtual Visit was conducted, with patient's consent, to reduce the patient's risk of exposure to COVID-19 and provide continuity of care for an established/new patient. Services were provided through a video synchronous discussion virtually to substitute for in-person clinic visit. I discussed with the patient the nature of our telehealth visits via interactive/real-time audio/video that:  - I would evaluate the patient and recommend diagnostics and treatments based on my assessment  - Our sessions are not being recorded and that personal health information is protected  - Our team would provide follow up care in person if/when the patient needs it. In summary, your patient, Tyree Gardner exhibits the following, with associated plan:      1. Relapsing remitting multiple sclerosis with last exacerbation in December 2019. She has also been having visual changes which is concerning for possible visual migraines however she does not typically get a headache with the visual changes. 1. The patient's last absolute lymphocyte count was in January 2021. I advised that we need to get that done as soon as possible because lymphopenia is a side effect of her current disease modifying therapy. 2. Continue Mayzent 2 mg  3. Obtain previously ordered CBC and CMP  4. Continue to follow with opthalmology  5. MRI of the brain and thoracic spine with and without contrast in September 2021 showed no evidence of acute or active demyelination  2. Vitamin D deficiency  1.  Continue vitamin D supplementation  2. Obtain a vitamin D level      Signed: POLY Bhardwaj-CNP    [address]    Please note that this chart was generated using voice recognition Dragon dictation software. Although every effort was made to ensure the accuracy of this automated transcription, some errors in transcription may have occurred. Provider Attestation: The documentation recorded by the scribe accurately reflects the service I personally performed and the decisions made by myself. Portions of this note were transcribed by a scribe. I personally performed the history, physical exam, and medical decision-making and confirm the accuracy of the information in the transcribed note. Scribe Attestation:   By signing my name below, Kellysamira Manati, attest that this documentation has been prepared under the direction and in the presence of Becky Fields CNP.

## 2022-03-03 ENCOUNTER — HOSPITAL ENCOUNTER (OUTPATIENT)
Age: 42
Discharge: HOME | End: 2022-03-03
Payer: COMMERCIAL

## 2022-03-03 DIAGNOSIS — G35 MULTIPLE SCLEROSIS (HCC): ICD-10-CM

## 2022-03-03 LAB
ABSOLUTE EOS #: 0.22 K/UL (ref 0–0.44)
ABSOLUTE IMMATURE GRANULOCYTE: 0 K/UL (ref 0–0.3)
ABSOLUTE LYMPH #: 0.44 K/UL (ref 1.1–3.7)
ABSOLUTE MONO #: 0.5 K/UL (ref 0.1–1.2)
ALBUMIN SERPL-MCNC: 4.5 G/DL (ref 3.5–5.2)
ALBUMIN/GLOBULIN RATIO: 2.1 (ref 1–2.5)
ALP BLD-CCNC: 113 U/L (ref 35–104)
ALT SERPL-CCNC: 48 U/L (ref 5–33)
ANION GAP SERPL CALCULATED.3IONS-SCNC: 10 MMOL/L (ref 9–17)
AST SERPL-CCNC: 32 U/L
BASOPHILS # BLD: 0 % (ref 0–2)
BASOPHILS ABSOLUTE: 0 K/UL (ref 0–0.2)
BILIRUB SERPL-MCNC: 0.46 MG/DL (ref 0.3–1.2)
BUN BLDV-MCNC: 15 MG/DL (ref 6–20)
BUN/CREAT BLD: 19 (ref 9–20)
CALCIUM SERPL-MCNC: 9.7 MG/DL (ref 8.6–10.4)
CHLORIDE BLD-SCNC: 103 MMOL/L (ref 98–107)
CO2: 26 MMOL/L (ref 20–31)
CREAT SERPL-MCNC: 0.77 MG/DL (ref 0.5–0.9)
EOSINOPHILS RELATIVE PERCENT: 4 % (ref 1–4)
GFR AFRICAN AMERICAN: >60 ML/MIN
GFR NON-AFRICAN AMERICAN: >60 ML/MIN
GFR SERPL CREATININE-BSD FRML MDRD: ABNORMAL ML/MIN/{1.73_M2}
GFR SERPL CREATININE-BSD FRML MDRD: ABNORMAL ML/MIN/{1.73_M2}
GLUCOSE BLD-MCNC: 98 MG/DL (ref 70–99)
HCT VFR BLD CALC: 38.9 % (ref 36.3–47.1)
HEMOGLOBIN: 13.3 G/DL (ref 11.9–15.1)
IMMATURE GRANULOCYTES: 0 %
LYMPHOCYTES # BLD: 8 % (ref 24–43)
MCH RBC QN AUTO: 31.1 PG (ref 25.2–33.5)
MCHC RBC AUTO-ENTMCNC: 34.2 G/DL (ref 28.4–34.8)
MCV RBC AUTO: 91.1 FL (ref 82.6–102.9)
MONOCYTES # BLD: 9 % (ref 3–12)
MORPHOLOGY: NORMAL
NRBC AUTOMATED: 0 PER 100 WBC
PDW BLD-RTO: 13 % (ref 11.8–14.4)
PLATELET # BLD: 262 K/UL (ref 138–453)
PMV BLD AUTO: 9.9 FL (ref 8.1–13.5)
POTASSIUM SERPL-SCNC: 3.9 MMOL/L (ref 3.7–5.3)
RBC # BLD: 4.27 M/UL (ref 3.95–5.11)
SEG NEUTROPHILS: 79 % (ref 36–65)
SEGMENTED NEUTROPHILS ABSOLUTE COUNT: 4.34 K/UL (ref 1.5–8.1)
SODIUM BLD-SCNC: 139 MMOL/L (ref 135–144)
TOTAL PROTEIN: 6.6 G/DL (ref 6.4–8.3)
VITAMIN D 25-HYDROXY: 59.2 NG/ML
WBC # BLD: 5.5 K/UL (ref 3.5–11.3)

## 2022-03-03 PROCEDURE — 85025 COMPLETE CBC W/AUTO DIFF WBC: CPT

## 2022-03-03 PROCEDURE — 80053 COMPREHEN METABOLIC PANEL: CPT

## 2022-03-03 PROCEDURE — 82306 VITAMIN D 25 HYDROXY: CPT

## 2022-03-03 PROCEDURE — 36415 COLL VENOUS BLD VENIPUNCTURE: CPT

## 2022-03-08 DIAGNOSIS — G35 MULTIPLE SCLEROSIS (HCC): Primary | ICD-10-CM

## 2022-03-16 ENCOUNTER — TELEPHONE (OUTPATIENT)
Dept: NEUROLOGY | Age: 42
End: 2022-03-16

## 2022-03-16 NOTE — TELEPHONE ENCOUNTER
Pt called in stating that last night she was plugging something in and there was a huge spark. She got an shock on her right hand and there were black marks on her hand and in between her first and middle finger. She said she was able to wash off the black marks but is having pain in her fingers, her palm and up to her shoulder. She said she called her PCP and was instructed to go to ER for eval. She wanted to call here for advice. I instructed her to go to ER as well, she should have gone last night after the incident. She said that she needed to work today and wasn't sure about going. She didn't think it necessary. She is wondering what your opinion is. Please advise, thanks.

## 2022-03-30 RX ORDER — SIPONIMOD 2 MG/1
TABLET, FILM COATED ORAL
Qty: 30 TABLET | Refills: 5 | Status: SHIPPED | OUTPATIENT
Start: 2022-03-30 | End: 2022-09-26

## 2022-03-30 NOTE — TELEPHONE ENCOUNTER
Pharmacy requesting refill of Mayzent.       Medication active on med list yes      Date of last fill: 9/17/21  verified on 3/30/2022   verified by [facility] LPN      Date of last appointment 2/28/22    Next Visit Date:  9/6/2022

## 2022-05-05 ENCOUNTER — TELEPHONE (OUTPATIENT)
Dept: NEUROLOGY | Age: 42
End: 2022-05-05

## 2022-05-05 ENCOUNTER — HOSPITAL ENCOUNTER (OUTPATIENT)
Age: 42
Discharge: HOME | End: 2022-05-05
Payer: COMMERCIAL

## 2022-05-05 ENCOUNTER — HOSPITAL ENCOUNTER (OUTPATIENT)
Age: 42
Discharge: HOME | End: 2022-05-07
Payer: COMMERCIAL

## 2022-05-05 ENCOUNTER — HOSPITAL ENCOUNTER (OUTPATIENT)
Dept: GENERAL RADIOLOGY | Age: 42
Discharge: HOME | End: 2022-05-07
Payer: COMMERCIAL

## 2022-05-05 DIAGNOSIS — R10.9 STOMACH ACHE: ICD-10-CM

## 2022-05-05 DIAGNOSIS — G35 MULTIPLE SCLEROSIS (HCC): ICD-10-CM

## 2022-05-05 DIAGNOSIS — G35 MULTIPLE SCLEROSIS (HCC): Primary | ICD-10-CM

## 2022-05-05 LAB
ABSOLUTE EOS #: 0.38 K/UL (ref 0–0.44)
ABSOLUTE IMMATURE GRANULOCYTE: 0 K/UL (ref 0–0.3)
ABSOLUTE LYMPH #: 0.34 K/UL (ref 1.1–3.7)
ABSOLUTE MONO #: 0.3 K/UL (ref 0.1–1.2)
ALBUMIN SERPL-MCNC: 4.3 G/DL (ref 3.5–5.2)
ALBUMIN/GLOBULIN RATIO: 1.6 (ref 1–2.5)
ALP BLD-CCNC: 109 U/L (ref 35–104)
ALT SERPL-CCNC: 32 U/L (ref 5–33)
ANION GAP SERPL CALCULATED.3IONS-SCNC: 11 MMOL/L (ref 9–17)
AST SERPL-CCNC: 24 U/L
BASOPHILS # BLD: 0 % (ref 0–2)
BASOPHILS ABSOLUTE: 0 K/UL (ref 0–0.2)
BILIRUB SERPL-MCNC: 0.36 MG/DL (ref 0.3–1.2)
BUN BLDV-MCNC: 10 MG/DL (ref 6–20)
BUN/CREAT BLD: 15 (ref 9–20)
CALCIUM SERPL-MCNC: 9.9 MG/DL (ref 8.6–10.4)
CHLORIDE BLD-SCNC: 102 MMOL/L (ref 98–107)
CHOLESTEROL/HDL RATIO: 5.1
CHOLESTEROL: 226 MG/DL
CO2: 25 MMOL/L (ref 20–31)
CREAT SERPL-MCNC: 0.66 MG/DL (ref 0.5–0.9)
EOSINOPHILS RELATIVE PERCENT: 10 % (ref 1–4)
GFR AFRICAN AMERICAN: >60 ML/MIN
GFR NON-AFRICAN AMERICAN: >60 ML/MIN
GFR SERPL CREATININE-BSD FRML MDRD: ABNORMAL ML/MIN/{1.73_M2}
GFR SERPL CREATININE-BSD FRML MDRD: ABNORMAL ML/MIN/{1.73_M2}
GLUCOSE BLD-MCNC: 75 MG/DL (ref 70–99)
HCT VFR BLD CALC: 39.6 % (ref 36.3–47.1)
HDLC SERPL-MCNC: 44 MG/DL
HEMOGLOBIN: 13.6 G/DL (ref 11.9–15.1)
IMMATURE GRANULOCYTES: 0 %
LDL CHOLESTEROL: 142 MG/DL (ref 0–130)
LYMPHOCYTES # BLD: 9 % (ref 24–43)
MCH RBC QN AUTO: 30.8 PG (ref 25.2–33.5)
MCHC RBC AUTO-ENTMCNC: 34.3 G/DL (ref 28.4–34.8)
MCV RBC AUTO: 89.8 FL (ref 82.6–102.9)
MONOCYTES # BLD: 8 % (ref 3–12)
MORPHOLOGY: NORMAL
NRBC AUTOMATED: 0 PER 100 WBC
PDW BLD-RTO: 12.5 % (ref 11.8–14.4)
PLATELET # BLD: 287 K/UL (ref 138–453)
PMV BLD AUTO: 10.5 FL (ref 8.1–13.5)
POTASSIUM SERPL-SCNC: 4.1 MMOL/L (ref 3.7–5.3)
RBC # BLD: 4.41 M/UL (ref 3.95–5.11)
SEG NEUTROPHILS: 73 % (ref 36–65)
SEGMENTED NEUTROPHILS ABSOLUTE COUNT: 2.78 K/UL (ref 1.5–8.1)
SODIUM BLD-SCNC: 138 MMOL/L (ref 135–144)
TOTAL PROTEIN: 7 G/DL (ref 6.4–8.3)
TRIGL SERPL-MCNC: 201 MG/DL
TROPONIN, HIGH SENSITIVITY: 6 NG/L (ref 0–14)
TSH SERPL DL<=0.05 MIU/L-ACNC: 1.33 UIU/ML (ref 0.3–5)
WBC # BLD: 3.8 K/UL (ref 3.5–11.3)

## 2022-05-05 PROCEDURE — 82306 VITAMIN D 25 HYDROXY: CPT

## 2022-05-05 PROCEDURE — 84443 ASSAY THYROID STIM HORMONE: CPT

## 2022-05-05 PROCEDURE — 74018 RADEX ABDOMEN 1 VIEW: CPT

## 2022-05-05 PROCEDURE — 85025 COMPLETE CBC W/AUTO DIFF WBC: CPT

## 2022-05-05 PROCEDURE — 82746 ASSAY OF FOLIC ACID SERUM: CPT

## 2022-05-05 PROCEDURE — 36415 COLL VENOUS BLD VENIPUNCTURE: CPT

## 2022-05-05 PROCEDURE — 84484 ASSAY OF TROPONIN QUANT: CPT

## 2022-05-05 PROCEDURE — 80053 COMPREHEN METABOLIC PANEL: CPT

## 2022-05-05 PROCEDURE — 80061 LIPID PANEL: CPT

## 2022-05-05 PROCEDURE — 82607 VITAMIN B-12: CPT

## 2022-05-05 NOTE — TELEPHONE ENCOUNTER
Patient called stating that her blurry vision, headaches, and fatigue are getting worse. She said that she currently only takes ibuprofen for her headaches. Please advise. She also wanted to make it known that she is going in for labs and an x-ray due to pain on right side of abdomen. This was ordered by her PCP.

## 2022-05-06 LAB
FOLATE: 11.9 NG/ML
VITAMIN B-12: 451 PG/ML (ref 232–1245)
VITAMIN D 25-HYDROXY: 77.2 NG/ML

## 2022-05-12 ENCOUNTER — TELEPHONE (OUTPATIENT)
Dept: NEUROLOGY | Age: 42
End: 2022-05-12

## 2022-05-15 NOTE — TELEPHONE ENCOUNTER
Please find out if her labs include a urine specimen.   If not, I would like to order a routine urinalysis with reflex to culture

## 2022-05-20 NOTE — TELEPHONE ENCOUNTER
Form completed and faxed. Patient notified and a copy has been given to the patient via [address]

## 2022-06-30 ENCOUNTER — HOSPITAL ENCOUNTER (OUTPATIENT)
Age: 42
Discharge: HOME | End: 2022-06-30
Payer: COMMERCIAL

## 2022-06-30 PROCEDURE — 81001 URINALYSIS AUTO W/SCOPE: CPT

## 2022-07-01 ENCOUNTER — HOSPITAL ENCOUNTER (OUTPATIENT)
Age: 42
Setting detail: SPECIMEN
Discharge: HOME | End: 2022-07-01

## 2022-07-01 DIAGNOSIS — G35 MULTIPLE SCLEROSIS (HCC): ICD-10-CM

## 2022-07-01 LAB
-: ABNORMAL
BACTERIA: ABNORMAL
BILIRUBIN URINE: NEGATIVE
COLOR: YELLOW
EPITHELIAL CELLS UA: ABNORMAL /HPF (ref 0–25)
GLUCOSE URINE: NEGATIVE
KETONES, URINE: NEGATIVE
LEUKOCYTE ESTERASE, URINE: ABNORMAL
NITRITE, URINE: POSITIVE
PH UA: 6 (ref 5–9)
PROTEIN UA: NEGATIVE
RBC UA: ABNORMAL /HPF (ref 0–2)
SPECIFIC GRAVITY UA: <1.005 (ref 1.01–1.02)
TURBIDITY: CLEAR
URINE HGB: NEGATIVE
UROBILINOGEN, URINE: NORMAL
WBC UA: ABNORMAL /HPF (ref 0–5)

## 2022-07-05 RX ORDER — SULFAMETHOXAZOLE AND TRIMETHOPRIM 800; 160 MG/1; MG/1
1 TABLET ORAL 2 TIMES DAILY
Qty: 14 TABLET | Refills: 0 | Status: SHIPPED | OUTPATIENT
Start: 2022-07-05 | End: 2022-07-12

## 2022-08-29 ENCOUNTER — TELEPHONE (OUTPATIENT)
Dept: NEUROLOGY | Age: 42
End: 2022-08-29

## 2022-08-29 DIAGNOSIS — G35 MULTIPLE SCLEROSIS EXACERBATION (HCC): Primary | ICD-10-CM

## 2022-08-29 NOTE — TELEPHONE ENCOUNTER
Patient notified of STAT MRI ordered today. Patient calling scheduling to see if she can get it scheduled ASAP.

## 2022-08-29 NOTE — TELEPHONE ENCOUNTER
Patient called in today and said she is having numbness and pain on her lower body. She said that when she is standing she feels burning and pulling in her calf's and heels. She said that when she is sitting she feels tightness and numbness, its mainly in her left knee. She stated that this started last night for the numbness and the pain has been going. She said she is still experiencing it. She said that it hurts but not all the time. She also stated that she said that she has been having headaches on her right temple. She said that she takes tylenol for her headaches. She rated her pain at an 5. She said that it feels like stabbing and dull pain sometimes. She said that it varies on her movement. She said that she is also fatigue.

## 2022-08-31 ENCOUNTER — HOSPITAL ENCOUNTER (OUTPATIENT)
Dept: MRI IMAGING | Age: 42
Discharge: HOME | End: 2022-09-02
Payer: COMMERCIAL

## 2022-08-31 DIAGNOSIS — G35 MULTIPLE SCLEROSIS EXACERBATION (HCC): ICD-10-CM

## 2022-08-31 PROCEDURE — 6360000004 HC RX CONTRAST MEDICATION: Performed by: NURSE PRACTITIONER

## 2022-08-31 PROCEDURE — A9579 GAD-BASE MR CONTRAST NOS,1ML: HCPCS | Performed by: NURSE PRACTITIONER

## 2022-08-31 PROCEDURE — 70553 MRI BRAIN STEM W/O & W/DYE: CPT

## 2022-08-31 RX ADMIN — GADOTERIDOL 20 ML: 279.3 INJECTION, SOLUTION INTRAVENOUS at 10:36

## 2022-09-04 ENCOUNTER — HOSPITAL ENCOUNTER (EMERGENCY)
Age: 42
Discharge: LEFT BEFORE DISCHARGE | End: 2022-09-04
Payer: COMMERCIAL

## 2022-09-04 ENCOUNTER — APPOINTMENT (OUTPATIENT)
Dept: CT IMAGING | Age: 42
End: 2022-09-04
Payer: COMMERCIAL

## 2022-09-04 VITALS
RESPIRATION RATE: 12 BRPM | DIASTOLIC BLOOD PRESSURE: 90 MMHG | OXYGEN SATURATION: 98 % | HEART RATE: 82 BPM | SYSTOLIC BLOOD PRESSURE: 117 MMHG | TEMPERATURE: 98.2 F

## 2022-09-04 PROCEDURE — 4500000002 HC ER NO CHARGE

## 2022-09-04 PROCEDURE — 70450 CT HEAD/BRAIN W/O DYE: CPT

## 2022-09-04 PROCEDURE — 72125 CT NECK SPINE W/O DYE: CPT

## 2022-09-04 ASSESSMENT — PAIN DESCRIPTION - LOCATION: LOCATION: JAW

## 2022-09-04 ASSESSMENT — PAIN - FUNCTIONAL ASSESSMENT: PAIN_FUNCTIONAL_ASSESSMENT: 0-10

## 2022-09-04 ASSESSMENT — PAIN SCALES - GENERAL: PAINLEVEL_OUTOF10: 5

## 2022-09-06 ENCOUNTER — TELEMEDICINE (OUTPATIENT)
Dept: NEUROLOGY | Age: 42
End: 2022-09-06
Payer: COMMERCIAL

## 2022-09-06 DIAGNOSIS — E55.9 VITAMIN D DEFICIENCY: ICD-10-CM

## 2022-09-06 DIAGNOSIS — G35 MULTIPLE SCLEROSIS (HCC): Primary | ICD-10-CM

## 2022-09-06 PROCEDURE — 1036F TOBACCO NON-USER: CPT | Performed by: NURSE PRACTITIONER

## 2022-09-06 PROCEDURE — G8427 DOCREV CUR MEDS BY ELIG CLIN: HCPCS | Performed by: NURSE PRACTITIONER

## 2022-09-06 PROCEDURE — 99214 OFFICE O/P EST MOD 30 MIN: CPT | Performed by: NURSE PRACTITIONER

## 2022-09-06 PROCEDURE — G8417 CALC BMI ABV UP PARAM F/U: HCPCS | Performed by: NURSE PRACTITIONER

## 2022-09-06 NOTE — PROGRESS NOTES
[facility]  [address], [place], [address] [facility]  Phone: [phone]  Fax: [phone]    MD Shanika Alvarenga MD Major Lars, MD Mardi Billet, SPENCER    TELEHEALTH VISIT        9/6/2022      HISTORY OF PRESENT ILLNESS:       I had the pleasure of seeing Elan Dent, who returns via Telehealth for continued neurologic care. The patient was seen last on February 28, 2022 for treatment of relapsing remitting multiple sclerosis and a vitamin D deficiency . The patient has relapsing remitting multiple sclerosis and was prescribed mayzent for disease modifying therapy. A CBC and CMP were completed in June 2022 with an ALT of 32, AST of 24 and an absolute lymphocyte count of 0.34. The patient contacted the office in July 2022 with a suspected UTI which was confirmed by urinalysis and she was prescribed bactrim twice daily for 7 days. She also contacted the office on August 29, 2022 stating she was having numbness and pain in her bilateral lower extremities primarily on the left which was concerning for a relapse. A STAT MRI of the brain was completed on August 31, 2022 with no evidence for acute or active demyelination. She reports today that she has continued having numbness in her bilateral lower extremity which is worsened on the left. She notes that she also has worsened weakness of the left lower extremity which has caused her to fall. The numbness began in her right lower extremity however the right leg has improved to a minimal intensity.      DISEASE SUMMARY  Date of onset: 1998 (R sensory loss and dizziness)  Date of diagnosis of MS: 1998  Disease course at onset: Relapsing-Remitting  Current disease course: Relapsing-Remitting  Previous disease therapies: IVMP, Betaseron ( [id number], pregnancy), Avonex (2009, took for 3-4 mos, flu-like symptoms), copaxone  Current disease therapy: Marvene Piute (8/2020-present),  (1/14/21)  CSF: N/A  JCV serology result and date: N/A  Vitamin D: 41.7 (21)    The patient also has a vitamin D deficiency and takes over the counter vitamin D replacement at 10,000 units daily. Her most recent vitamin D level completed in May 2022 was normal at 77.2. Testing Reviewed:    MRI Brain W WO Contrast 2022  Impression   No acute intracranial abnormality. No mass effect or abnormal intracranial   enhancement. Patchy areas of T2/FLAIR hyperintensity in the white matter, likely related   to reported history of demyelinating disease. No new lesion or associated   postcontrast enhancement to suggest active demyelination. Labs Completed 2022  ALT 5 - 33 U/L 32  48 High     AST <32 U/L 24       Absolute Lymph # 1.10 - 3.70 k/uL 0.34 Low    .   Color, UA Yellow Yellow    Turbidity UA Clear Clear    Glucose, Ur NEGATIVE NEGATIVE    Bilirubin Urine NEGATIVE NEGATIVE    Ketones, Urine NEGATIVE NEGATIVE    Specific Gravity, UA 1.010 - 1.020 <1.005 Low     Urine Hgb NEGATIVE NEGATIVE    pH, UA 5.0 - 9.0 6.0    Protein, UA NEGATIVE NEGATIVE    Urobilinogen, Urine Normal Normal    Nitrite, Urine NEGATIVE POSITIVE Abnormal     Leukocyte Esterase, Urine NEGATIVE TRACE Abnormal       WBC, UA 0 - 5 /HPF 0 TO 2    RBC, UA 0 - 2 /HPF 0 TO 2    Epithelial Cells UA 0 - 25 /HPF 0 TO 2    Bacteria, UA None 4+ Abnormal       Labs Completed 2022  Vit D, 25-Hydroxy >29.9 ng/mL 77.2        PAST MEDICAL HISTORY:         Diagnosis Date    Acute relapsing multiple sclerosis (HCC)     Lumbar herniated disc 2014    back injury     MS (multiple sclerosis) ([facility] [address]         PAST SURGICAL HISTORY:         Procedure Laterality Date     SECTION      times 3    KNEE SURGERY Left     cyst removal        SOCIAL HISTORY:     Social History     Socioeconomic History    Marital status:      Spouse name: Not on file    Number of children: Not on file    Years of education: Not on file    Highest education level: Not on Urinary Urgency   Musculoskeletal [] Neck pain  [] Back pain  [] Muscle pain  [] Restless legs   Dermatologic [] Skin changes   Neurologic [] Memory loss/confusion  [] Seizures  [] Trouble walking or imbalance  [] Dizziness  [] Sleep disturbance  [] Weakness  [x] Numbness  [] Tremors  [] Speech Difficulty  [] Headaches  [] Light Sensitivity  [] Sound Sensitivity   Endocrinology []Excessive thirst  []Excessive hunger   Psychiatric [] Anxiety/Depression  [] Hallucination   Allergy/immunology []Hives/environmental allergies   Hematologic/lymph [] Abnormal bleeding  [] Abnormal bruising          PHYSICAL EXAMINATION:                                         .                                                                                                    General Appearance:  Alert, cooperative, no signs of distress, appears stated age   Head:  Normocephalic, no signs of trauma   Eyes:  Conjunctiva/corneas clear;  eyelids intact   Ears:  Normal external ear and canals   Nose: Nares normal, no drainage    Throat: Lips and tongue normal; teeth normal;  gums normal   Extremities: Extremities normal, no cyanosis, no edema   Skin: Skin color, texture normal, no rashes, no lesions                                     NEUROLOGIC EXAMINATION      Mental status    Alert and oriented x 3; able to follow commands, speech and language intact; no hallucinations or delusions  Fund of information appropriate for level of education    Cranial nerves    II - grossly intact  III, IV, VI - extra-ocular muscles full: no nystagmus, no ptosis   V - normal facial sensation                                                               VII - normal facial symmetry                                                             VIII - intact hearing                                                                             IX, X - symmetrical palate                                                                  XI - symmetrical shoulder shrug XII - tongue midline without atrophy      Motor function  Normal muscle bulk. Strength at least 5/5 on all 4 extremities, no pronator drift      Sensory function Unable to test      Cerebellar Intact fine motor movement. No involuntary movements or tremors. No ataxia or dysmetria on finger to nose or heel to shin testing      Reflex function Unable to test      Gait                   normal base and arm swing              ASSESSMENT AND PLAN:     This is a telehealth visit that was performed with the originating site at Patient Location: home and Provider Location of [place], [place]. Verbal consent to participate in video visit was obtained. Pursuant to the emergency declaration under the [facility] waiver authority and the [employer] Act, this Virtual Visit was conducted, with patient's consent, to reduce the patient's risk of exposure to COVID-19 and provide continuity of care for an established/new patient. Services were provided through a video synchronous discussion virtually to substitute for in-person clinic visit. I discussed with the patient the nature of our telehealth visits via interactive/real-time audio/video that:  - I would evaluate the patient and recommend diagnostics and treatments based on my assessment  - Our sessions are not being recorded and that personal health information is protected  - Our team would provide follow up care in person if/when the patient needs it. In summary, your patient, Mortimer Lambert exhibits the following, with associated plan:    Relapsing remitting multiple sclerosis with last exacerbation in December 2019. She has also been having visual changes which is concerning for possible visual migraines however she does not typically get a headache with the visual changes.  She has recently been having pain and numbness in her bilateral lower extremities which is concerning for a possible spinal lesion. The patient's last absolute lymphocyte count was 0.38. I advised that we need to get that done as soon as possible because lymphopenia is a side effect of her current disease modifying therapy. Continue Mayzent 2 mg  A CBC and CMP were completed in June 2022 with an ALT of 32, AST of 24 and an absolute lymphocyte count of 0.34  Obtain a CBC with differential   Continue to follow with opthalmology  I recommended she obtain MRI of the cervical and thoracic spine with and without contrast due to new onset numbness and burning in her bilateral lower extremities however she wished to hold off at this time. I strongly encouraged her to do so, but she continued to decline  Vitamin D deficiency  Continue vitamin D supplementation  Vitamin D level was normal in May 2022 at 77.2      Signed: POLY North-CNP    [facility]    Please note that this chart was generated using voice recognition Dragon dictation software. Although every effort was made to ensure the accuracy of this automated transcription, some errors in transcription may have occurred. Provider Attestation: The documentation recorded by the scribe accurately reflects the service I personally performed and the decisions made by myself. Portions of this note were transcribed by a scribe. I personally performed the history, physical exam, and medical decision-making and confirm the accuracy of the information in the transcribed note. Scribe Attestation:   By signing my name below, Donnie Alba, attest that this documentation has been prepared under the direction and in the presence of Jade Araya CNP.

## 2022-09-07 ENCOUNTER — HOSPITAL ENCOUNTER (OUTPATIENT)
Age: 42
Discharge: HOME | End: 2022-09-07
Payer: COMMERCIAL

## 2022-09-07 DIAGNOSIS — G35 MULTIPLE SCLEROSIS (HCC): ICD-10-CM

## 2022-09-07 LAB
ABSOLUTE EOS #: 0.05 K/UL (ref 0–0.44)
ABSOLUTE IMMATURE GRANULOCYTE: 0 K/UL (ref 0–0.3)
ABSOLUTE LYMPH #: 0.36 K/UL (ref 1.1–3.7)
ABSOLUTE MONO #: 0.52 K/UL (ref 0.1–1.2)
BASOPHILS # BLD: 0 % (ref 0–2)
BASOPHILS ABSOLUTE: 0 K/UL (ref 0–0.2)
EOSINOPHILS RELATIVE PERCENT: 1 % (ref 1–4)
HCT VFR BLD CALC: 36.8 % (ref 36.3–47.1)
HEMOGLOBIN: 12.6 G/DL (ref 11.9–15.1)
IMMATURE GRANULOCYTES: 0 %
LYMPHOCYTES # BLD: 7 % (ref 24–43)
MCH RBC QN AUTO: 31.1 PG (ref 25.2–33.5)
MCHC RBC AUTO-ENTMCNC: 34.2 G/DL (ref 28.4–34.8)
MCV RBC AUTO: 90.9 FL (ref 82.6–102.9)
MONOCYTES # BLD: 10 % (ref 3–12)
MORPHOLOGY: NORMAL
NRBC AUTOMATED: 0 PER 100 WBC
PDW BLD-RTO: 12.8 % (ref 11.8–14.4)
PLATELET # BLD: 262 K/UL (ref 138–453)
PMV BLD AUTO: 9.9 FL (ref 8.1–13.5)
RBC # BLD: 4.05 M/UL (ref 3.95–5.11)
SEG NEUTROPHILS: 82 % (ref 36–65)
SEGMENTED NEUTROPHILS ABSOLUTE COUNT: 4.27 K/UL (ref 1.5–8.1)
WBC # BLD: 5.2 K/UL (ref 3.5–11.3)

## 2022-09-07 PROCEDURE — 85025 COMPLETE CBC W/AUTO DIFF WBC: CPT

## 2022-09-07 PROCEDURE — 36415 COLL VENOUS BLD VENIPUNCTURE: CPT

## 2022-09-09 DIAGNOSIS — G35 MULTIPLE SCLEROSIS (HCC): Primary | ICD-10-CM

## 2022-09-26 RX ORDER — SIPONIMOD 2 MG/1
TABLET, FILM COATED ORAL
Qty: 30 TABLET | Refills: 0 | Status: SHIPPED | OUTPATIENT
Start: 2022-09-26 | End: 2022-10-11

## 2022-10-10 DIAGNOSIS — G35 MULTIPLE SCLEROSIS (HCC): Primary | ICD-10-CM

## 2022-10-11 RX ORDER — SIPONIMOD 2 MG/1
TABLET, FILM COATED ORAL
Qty: 30 TABLET | Refills: 5 | Status: SHIPPED | OUTPATIENT
Start: 2022-10-11 | End: 2023-04-09

## 2022-10-11 NOTE — TELEPHONE ENCOUNTER
Pharmacy requesting refill of Mayzent.       Medication active on med list yes      Date of last fill: 9/26/21  verified on 10/11/2022   verified by [facility] LPN      Date of last appointment 9/6/22    Next Visit Date:  Visit date not found, schedule is not yet open

## 2022-11-16 ENCOUNTER — TELEPHONE (OUTPATIENT)
Dept: NEUROLOGY | Age: 42
End: 2022-11-16

## 2022-11-16 RX ORDER — PREDNISONE 20 MG/1
TABLET ORAL
Qty: 18 TABLET | Refills: 0 | Status: SHIPPED | OUTPATIENT
Start: 2022-11-16

## 2022-11-16 NOTE — TELEPHONE ENCOUNTER
Haylee Sims called the office this morning stating that she has had daily headaches for the past couple weeks along with dizziness, fatigue and bilateral leg numbness. She did see her eye doctor but was told she only needs glasses for reading. Haylee Sims states that she has been using Tylenol and Ibuprofen alternately almost around the clock. They won't take the headache away completely, only dulling it for a while. Please advise.

## 2022-11-16 NOTE — TELEPHONE ENCOUNTER
Call placed back to Rohan Mack and this was discussed. Patient is agreeable to try the steroid taper and would like this sent to [facility] in [facility]. I asked if she has a history of headaches. Patient stated that she has had them intermittently in the past, but nothing like what she is going through now. I explained how the taper works and asked that she call if there were any problems.   Prednisone taper Rx will be generated and sent to [facility] for her approval.

## 2022-12-02 ENCOUNTER — HOSPITAL ENCOUNTER (OUTPATIENT)
Age: 42
Discharge: HOME | End: 2022-12-02
Payer: COMMERCIAL

## 2022-12-02 DIAGNOSIS — G35 MULTIPLE SCLEROSIS (HCC): ICD-10-CM

## 2022-12-02 LAB
ABSOLUTE EOS #: 0.2 K/UL (ref 0–0.44)
ABSOLUTE IMMATURE GRANULOCYTE: 0 K/UL (ref 0–0.3)
ABSOLUTE LYMPH #: 0.2 K/UL (ref 1.1–3.7)
ABSOLUTE MONO #: 0.26 K/UL (ref 0.1–1.2)
ALBUMIN SERPL-MCNC: 4.3 G/DL (ref 3.5–5.2)
ALBUMIN/GLOBULIN RATIO: 1.8 (ref 1–2.5)
ALP BLD-CCNC: 118 U/L (ref 35–104)
ALT SERPL-CCNC: 30 U/L (ref 5–33)
ANION GAP SERPL CALCULATED.3IONS-SCNC: 9 MMOL/L (ref 9–17)
AST SERPL-CCNC: 20 U/L
BASOPHILS # BLD: 0 % (ref 0–2)
BASOPHILS ABSOLUTE: 0 K/UL (ref 0–0.2)
BILIRUB SERPL-MCNC: 0.3 MG/DL (ref 0.3–1.2)
BUN BLDV-MCNC: 13 MG/DL (ref 6–20)
BUN/CREAT BLD: 16 (ref 9–20)
CALCIUM SERPL-MCNC: 9.1 MG/DL (ref 8.6–10.4)
CHLORIDE BLD-SCNC: 105 MMOL/L (ref 98–107)
CO2: 23 MMOL/L (ref 20–31)
CREAT SERPL-MCNC: 0.8 MG/DL (ref 0.5–0.9)
EOSINOPHILS RELATIVE PERCENT: 4 % (ref 1–4)
GFR SERPL CREATININE-BSD FRML MDRD: >60 ML/MIN/1.73M2
GLUCOSE BLD-MCNC: 116 MG/DL (ref 70–99)
HCT VFR BLD CALC: 38.1 % (ref 36.3–47.1)
HEMOGLOBIN: 13.4 G/DL (ref 11.9–15.1)
IMMATURE GRANULOCYTES: 0 %
LYMPHOCYTES # BLD: 4 % (ref 24–43)
MCH RBC QN AUTO: 31.6 PG (ref 25.2–33.5)
MCHC RBC AUTO-ENTMCNC: 35.2 G/DL (ref 28.4–34.8)
MCV RBC AUTO: 89.9 FL (ref 82.6–102.9)
MONOCYTES # BLD: 5 % (ref 3–12)
MORPHOLOGY: NORMAL
NRBC AUTOMATED: 0 PER 100 WBC
PDW BLD-RTO: 12.6 % (ref 11.8–14.4)
PLATELET # BLD: 246 K/UL (ref 138–453)
PMV BLD AUTO: 10 FL (ref 8.1–13.5)
POTASSIUM SERPL-SCNC: 3.7 MMOL/L (ref 3.7–5.3)
RBC # BLD: 4.24 M/UL (ref 3.95–5.11)
SEG NEUTROPHILS: 87 % (ref 36–65)
SEGMENTED NEUTROPHILS ABSOLUTE COUNT: 4.44 K/UL (ref 1.5–8.1)
SODIUM BLD-SCNC: 137 MMOL/L (ref 135–144)
TOTAL PROTEIN: 6.7 G/DL (ref 6.4–8.3)
WBC # BLD: 5.1 K/UL (ref 3.5–11.3)

## 2022-12-02 PROCEDURE — 80053 COMPREHEN METABOLIC PANEL: CPT

## 2022-12-02 PROCEDURE — 36415 COLL VENOUS BLD VENIPUNCTURE: CPT

## 2022-12-02 PROCEDURE — 85025 COMPLETE CBC W/AUTO DIFF WBC: CPT

## 2022-12-07 ENCOUNTER — TELEPHONE (OUTPATIENT)
Dept: NEUROLOGY | Age: 42
End: 2022-12-07

## 2022-12-07 DIAGNOSIS — G35 MULTIPLE SCLEROSIS (HCC): Primary | ICD-10-CM

## 2022-12-07 NOTE — TELEPHONE ENCOUNTER
Call placed to patient to discuss recent lab results. She stated that she is not sure if she would be interested in receiving infusions. She was notified that Joey Higginbotham or Joycelyn Marina will be calling her to further discuss this matter. She voiced understanding.

## 2022-12-08 RX ORDER — METHYLPREDNISOLONE SODIUM SUCCINATE 125 MG/2ML
100 INJECTION, POWDER, LYOPHILIZED, FOR SOLUTION INTRAMUSCULAR; INTRAVENOUS ONCE
OUTPATIENT
Start: 2022-12-08

## 2022-12-08 RX ORDER — FAMOTIDINE 10 MG/ML
20 INJECTION, SOLUTION INTRAVENOUS
OUTPATIENT
Start: 2022-12-08

## 2022-12-08 RX ORDER — DIPHENHYDRAMINE HYDROCHLORIDE 50 MG/ML
25 INJECTION INTRAMUSCULAR; INTRAVENOUS ONCE
OUTPATIENT
Start: 2022-12-08 | End: 2022-12-08

## 2022-12-08 RX ORDER — DIPHENHYDRAMINE HYDROCHLORIDE 50 MG/ML
50 INJECTION INTRAMUSCULAR; INTRAVENOUS
OUTPATIENT
Start: 2022-12-08

## 2022-12-08 RX ORDER — EPINEPHRINE 1 MG/ML
0.3 INJECTION, SOLUTION, CONCENTRATE INTRAVENOUS PRN
OUTPATIENT
Start: 2022-12-08

## 2022-12-08 RX ORDER — SODIUM CHLORIDE 9 MG/ML
5-250 INJECTION, SOLUTION INTRAVENOUS PRN
OUTPATIENT
Start: 2022-12-08

## 2022-12-08 RX ORDER — ACETAMINOPHEN 325 MG/1
650 TABLET ORAL ONCE
OUTPATIENT
Start: 2022-12-08 | End: 2022-12-08

## 2022-12-08 RX ORDER — ONDANSETRON 2 MG/ML
8 INJECTION INTRAMUSCULAR; INTRAVENOUS
OUTPATIENT
Start: 2022-12-08

## 2022-12-08 RX ORDER — SODIUM CHLORIDE 0.9 % (FLUSH) 0.9 %
5-40 SYRINGE (ML) INJECTION PRN
OUTPATIENT
Start: 2022-12-08

## 2022-12-08 RX ORDER — ALBUTEROL SULFATE 90 UG/1
4 AEROSOL, METERED RESPIRATORY (INHALATION) PRN
OUTPATIENT
Start: 2022-12-08

## 2022-12-08 RX ORDER — HEPARIN SODIUM (PORCINE) LOCK FLUSH IV SOLN 100 UNIT/ML 100 UNIT/ML
500 SOLUTION INTRAVENOUS PRN
OUTPATIENT
Start: 2022-12-08

## 2022-12-08 RX ORDER — SODIUM CHLORIDE 9 MG/ML
INJECTION, SOLUTION INTRAVENOUS CONTINUOUS
OUTPATIENT
Start: 2022-12-08

## 2022-12-08 RX ORDER — ACETAMINOPHEN 325 MG/1
650 TABLET ORAL
OUTPATIENT
Start: 2022-12-08

## 2022-12-08 NOTE — TELEPHONE ENCOUNTER
Patient was contacted by phone regarding her absolute lymphocyte count of 200. I advised that since it was a slow, she could no longer continue on the [address] for treatment of her multiple sclerosis. I provided options of either [facility] or [facility]. The patient decided on Ocrevus. A therapy plan was initiated which will include hepatitis B testing. The patient verbalized understanding. The goal is to have the patient on drug as soon as possible. As soon as we know that she will be approved and that her hepatitis testing was normal, we will advise her to stop Mayzent.

## 2022-12-08 NOTE — TELEPHONE ENCOUNTER
Mireille Mendez called back. She said usually any time after 2:45 she is off work and available. She apologized for missing Jaclyn's call.

## 2022-12-08 NOTE — TELEPHONE ENCOUNTER
Called twice to the patient, however there is no answer and her mailbox is full. If the patient can be reached, can she please set up an appropriate time that I may be able to reach her to discuss her disease modifying therapy?

## 2022-12-13 ENCOUNTER — HOSPITAL ENCOUNTER (OUTPATIENT)
Age: 42
Discharge: HOME | End: 2022-12-13
Payer: COMMERCIAL

## 2022-12-13 DIAGNOSIS — G35 MULTIPLE SCLEROSIS (HCC): ICD-10-CM

## 2022-12-13 LAB
ABSOLUTE EOS #: 0.13 K/UL (ref 0–0.44)
ABSOLUTE IMMATURE GRANULOCYTE: 0 K/UL (ref 0–0.3)
ABSOLUTE LYMPH #: 0.31 K/UL (ref 1.1–3.7)
ABSOLUTE MONO #: 0.52 K/UL (ref 0.1–1.2)
ALBUMIN SERPL-MCNC: 4.1 G/DL (ref 3.5–5.2)
ALBUMIN/GLOBULIN RATIO: 1.7 (ref 1–2.5)
ALP BLD-CCNC: 102 U/L (ref 35–104)
ALT SERPL-CCNC: 27 U/L (ref 5–33)
ANION GAP SERPL CALCULATED.3IONS-SCNC: 9 MMOL/L (ref 9–17)
AST SERPL-CCNC: 24 U/L
BASOPHILS # BLD: 0 % (ref 0–2)
BASOPHILS ABSOLUTE: 0 K/UL (ref 0–0.2)
BILIRUB SERPL-MCNC: 0.4 MG/DL (ref 0.3–1.2)
BUN BLDV-MCNC: 10 MG/DL (ref 6–20)
BUN/CREAT BLD: 14 (ref 9–20)
CALCIUM SERPL-MCNC: 9 MG/DL (ref 8.6–10.4)
CHLORIDE BLD-SCNC: 107 MMOL/L (ref 98–107)
CO2: 26 MMOL/L (ref 20–31)
CREAT SERPL-MCNC: 0.74 MG/DL (ref 0.5–0.9)
EOSINOPHILS RELATIVE PERCENT: 5 % (ref 1–4)
GFR SERPL CREATININE-BSD FRML MDRD: >60 ML/MIN/1.73M2
GLUCOSE BLD-MCNC: 87 MG/DL (ref 70–99)
HBV SURFACE AB TITR SER: 13.86 MIU/ML
HCT VFR BLD CALC: 37.6 % (ref 36.3–47.1)
HEMOGLOBIN: 12.8 G/DL (ref 11.9–15.1)
HEPATITIS B SURFACE ANTIGEN: NONREACTIVE
IMMATURE GRANULOCYTES: 0 %
LYMPHOCYTES # BLD: 12 % (ref 24–43)
MCH RBC QN AUTO: 31.2 PG (ref 25.2–33.5)
MCHC RBC AUTO-ENTMCNC: 34 G/DL (ref 28.4–34.8)
MCV RBC AUTO: 91.7 FL (ref 82.6–102.9)
MONOCYTES # BLD: 20 % (ref 3–12)
MORPHOLOGY: NORMAL
NRBC AUTOMATED: 0 PER 100 WBC
PDW BLD-RTO: 12.4 % (ref 11.8–14.4)
PLATELET # BLD: 269 K/UL (ref 138–453)
PMV BLD AUTO: 9.9 FL (ref 8.1–13.5)
POTASSIUM SERPL-SCNC: 4.6 MMOL/L (ref 3.7–5.3)
RBC # BLD: 4.1 M/UL (ref 3.95–5.11)
SEG NEUTROPHILS: 63 % (ref 36–65)
SEGMENTED NEUTROPHILS ABSOLUTE COUNT: 1.64 K/UL (ref 1.5–8.1)
SODIUM BLD-SCNC: 142 MMOL/L (ref 135–144)
TOTAL PROTEIN: 6.5 G/DL (ref 6.4–8.3)
WBC # BLD: 2.6 K/UL (ref 3.5–11.3)

## 2022-12-13 PROCEDURE — 36415 COLL VENOUS BLD VENIPUNCTURE: CPT

## 2022-12-13 PROCEDURE — 80053 COMPREHEN METABOLIC PANEL: CPT

## 2022-12-13 PROCEDURE — 85025 COMPLETE CBC W/AUTO DIFF WBC: CPT

## 2022-12-13 PROCEDURE — 86317 IMMUNOASSAY INFECTIOUS AGENT: CPT

## 2022-12-13 PROCEDURE — 86704 HEP B CORE ANTIBODY TOTAL: CPT

## 2022-12-13 PROCEDURE — 87340 HEPATITIS B SURFACE AG IA: CPT

## 2022-12-14 LAB — HEPATITIS B CORE TOTAL ANTIBODY: NONREACTIVE

## 2022-12-16 ENCOUNTER — TELEPHONE (OUTPATIENT)
Dept: ONCOLOGY | Age: 42
End: 2022-12-16

## 2022-12-21 NOTE — TELEPHONE ENCOUNTER
Samuel Seaman with [facility] called her on 12/16/22 and lm for her to call them to be scheduled.

## 2022-12-29 ENCOUNTER — HOSPITAL ENCOUNTER (OUTPATIENT)
Dept: INFUSION THERAPY | Age: 42
Discharge: HOME | End: 2022-12-29
Payer: COMMERCIAL

## 2022-12-29 VITALS
DIASTOLIC BLOOD PRESSURE: 74 MMHG | RESPIRATION RATE: 16 BRPM | HEART RATE: 78 BPM | TEMPERATURE: 97.7 F | SYSTOLIC BLOOD PRESSURE: 119 MMHG

## 2022-12-29 DIAGNOSIS — G35 MULTIPLE SCLEROSIS (HCC): Primary | ICD-10-CM

## 2022-12-29 PROCEDURE — 96415 CHEMO IV INFUSION ADDL HR: CPT

## 2022-12-29 PROCEDURE — 6360000002 HC RX W HCPCS: Performed by: NURSE PRACTITIONER

## 2022-12-29 PROCEDURE — 96365 THER/PROPH/DIAG IV INF INIT: CPT

## 2022-12-29 PROCEDURE — 6370000000 HC RX 637 (ALT 250 FOR IP): Performed by: NURSE PRACTITIONER

## 2022-12-29 PROCEDURE — 96366 THER/PROPH/DIAG IV INF ADDON: CPT

## 2022-12-29 PROCEDURE — 96375 TX/PRO/DX INJ NEW DRUG ADDON: CPT

## 2022-12-29 PROCEDURE — 96413 CHEMO IV INFUSION 1 HR: CPT

## 2022-12-29 PROCEDURE — 2580000003 HC RX 258: Performed by: NURSE PRACTITIONER

## 2022-12-29 RX ORDER — HEPARIN SODIUM (PORCINE) LOCK FLUSH IV SOLN 100 UNIT/ML 100 UNIT/ML
500 SOLUTION INTRAVENOUS PRN
OUTPATIENT
Start: 2023-01-10

## 2022-12-29 RX ORDER — DIPHENHYDRAMINE HYDROCHLORIDE 50 MG/ML
25 INJECTION INTRAMUSCULAR; INTRAVENOUS ONCE
Status: COMPLETED | OUTPATIENT
Start: 2022-12-29 | End: 2022-12-29

## 2022-12-29 RX ORDER — FAMOTIDINE 10 MG/ML
20 INJECTION, SOLUTION INTRAVENOUS
OUTPATIENT
Start: 2023-01-10

## 2022-12-29 RX ORDER — ACETAMINOPHEN 325 MG/1
650 TABLET ORAL
OUTPATIENT
Start: 2023-01-10

## 2022-12-29 RX ORDER — ACETAMINOPHEN 325 MG/1
650 TABLET ORAL ONCE
OUTPATIENT
Start: 2023-01-10 | End: 2023-01-10

## 2022-12-29 RX ORDER — SODIUM CHLORIDE 9 MG/ML
5-250 INJECTION, SOLUTION INTRAVENOUS PRN
OUTPATIENT
Start: 2023-01-10

## 2022-12-29 RX ORDER — DIPHENHYDRAMINE HYDROCHLORIDE 50 MG/ML
25 INJECTION INTRAMUSCULAR; INTRAVENOUS ONCE
OUTPATIENT
Start: 2023-01-10 | End: 2023-01-10

## 2022-12-29 RX ORDER — METHYLPREDNISOLONE SODIUM SUCCINATE 125 MG/2ML
100 INJECTION, POWDER, LYOPHILIZED, FOR SOLUTION INTRAMUSCULAR; INTRAVENOUS ONCE
OUTPATIENT
Start: 2023-01-10

## 2022-12-29 RX ORDER — DIPHENHYDRAMINE HYDROCHLORIDE 50 MG/ML
50 INJECTION INTRAMUSCULAR; INTRAVENOUS
OUTPATIENT
Start: 2023-01-10

## 2022-12-29 RX ORDER — EPINEPHRINE 1 MG/ML
0.3 INJECTION, SOLUTION, CONCENTRATE INTRAVENOUS PRN
OUTPATIENT
Start: 2023-01-10

## 2022-12-29 RX ORDER — ONDANSETRON 2 MG/ML
8 INJECTION INTRAMUSCULAR; INTRAVENOUS
OUTPATIENT
Start: 2023-01-10

## 2022-12-29 RX ORDER — SODIUM CHLORIDE 9 MG/ML
5-250 INJECTION, SOLUTION INTRAVENOUS PRN
Status: DISCONTINUED | OUTPATIENT
Start: 2022-12-29 | End: 2022-12-30 | Stop reason: HOSPADM

## 2022-12-29 RX ORDER — ACETAMINOPHEN 325 MG/1
650 TABLET ORAL ONCE
Status: COMPLETED | OUTPATIENT
Start: 2022-12-29 | End: 2022-12-29

## 2022-12-29 RX ORDER — SODIUM CHLORIDE 0.9 % (FLUSH) 0.9 %
5-40 SYRINGE (ML) INJECTION PRN
OUTPATIENT
Start: 2023-01-10

## 2022-12-29 RX ORDER — METHYLPREDNISOLONE SODIUM SUCCINATE 125 MG/2ML
100 INJECTION, POWDER, LYOPHILIZED, FOR SOLUTION INTRAMUSCULAR; INTRAVENOUS ONCE
Status: COMPLETED | OUTPATIENT
Start: 2022-12-29 | End: 2022-12-29

## 2022-12-29 RX ORDER — ALBUTEROL SULFATE 90 UG/1
4 AEROSOL, METERED RESPIRATORY (INHALATION) PRN
OUTPATIENT
Start: 2023-01-10

## 2022-12-29 RX ORDER — SODIUM CHLORIDE 9 MG/ML
INJECTION, SOLUTION INTRAVENOUS CONTINUOUS
OUTPATIENT
Start: 2023-01-10

## 2022-12-29 RX ADMIN — OCRELIZUMAB 300 MG: 300 INJECTION INTRAVENOUS at 08:52

## 2022-12-29 RX ADMIN — ACETAMINOPHEN 650 MG: 325 TABLET ORAL at 08:32

## 2022-12-29 RX ADMIN — SODIUM CHLORIDE 150 ML/HR: 9 INJECTION, SOLUTION INTRAVENOUS at 08:31

## 2022-12-29 RX ADMIN — DIPHENHYDRAMINE HYDROCHLORIDE 25 MG: 50 INJECTION, SOLUTION INTRAMUSCULAR; INTRAVENOUS at 08:32

## 2022-12-29 RX ADMIN — METHYLPREDNISOLONE SODIUM SUCCINATE 100 MG: 125 INJECTION, POWDER, FOR SOLUTION INTRAMUSCULAR; INTRAVENOUS at 08:32

## 2022-12-29 NOTE — PROGRESS NOTES
Patient here for ocrevus infusion. Arrives ambulatory. Denies any complaints. Infusion complete without incident. 1 hour post observation complete. Discharged in stable condition. Returns 1/12/2023 for ocrevus.

## 2023-01-12 ENCOUNTER — HOSPITAL ENCOUNTER (OUTPATIENT)
Dept: INFUSION THERAPY | Age: 43
Discharge: HOME | End: 2023-01-12
Payer: COMMERCIAL

## 2023-01-12 VITALS
SYSTOLIC BLOOD PRESSURE: 116 MMHG | RESPIRATION RATE: 16 BRPM | DIASTOLIC BLOOD PRESSURE: 72 MMHG | TEMPERATURE: 98 F | HEART RATE: 84 BPM

## 2023-01-12 DIAGNOSIS — G35 MULTIPLE SCLEROSIS (HCC): Primary | ICD-10-CM

## 2023-01-12 PROCEDURE — 96375 TX/PRO/DX INJ NEW DRUG ADDON: CPT

## 2023-01-12 PROCEDURE — 6360000002 HC RX W HCPCS: Performed by: NURSE PRACTITIONER

## 2023-01-12 PROCEDURE — 2580000003 HC RX 258: Performed by: NURSE PRACTITIONER

## 2023-01-12 PROCEDURE — 96413 CHEMO IV INFUSION 1 HR: CPT

## 2023-01-12 PROCEDURE — 6370000000 HC RX 637 (ALT 250 FOR IP): Performed by: NURSE PRACTITIONER

## 2023-01-12 PROCEDURE — 96415 CHEMO IV INFUSION ADDL HR: CPT

## 2023-01-12 PROCEDURE — 96374 THER/PROPH/DIAG INJ IV PUSH: CPT

## 2023-01-12 RX ORDER — SODIUM CHLORIDE 9 MG/ML
5-250 INJECTION, SOLUTION INTRAVENOUS PRN
OUTPATIENT
Start: 2023-06-15

## 2023-01-12 RX ORDER — ACETAMINOPHEN 325 MG/1
650 TABLET ORAL ONCE
Status: COMPLETED | OUTPATIENT
Start: 2023-01-12 | End: 2023-01-12

## 2023-01-12 RX ORDER — METHYLPREDNISOLONE SODIUM SUCCINATE 125 MG/2ML
100 INJECTION, POWDER, LYOPHILIZED, FOR SOLUTION INTRAMUSCULAR; INTRAVENOUS ONCE
Status: COMPLETED | OUTPATIENT
Start: 2023-01-12 | End: 2023-01-12

## 2023-01-12 RX ORDER — SODIUM CHLORIDE 9 MG/ML
5-250 INJECTION, SOLUTION INTRAVENOUS PRN
Status: DISCONTINUED | OUTPATIENT
Start: 2023-01-12 | End: 2023-01-13 | Stop reason: HOSPADM

## 2023-01-12 RX ORDER — SODIUM CHLORIDE 0.9 % (FLUSH) 0.9 %
5-40 SYRINGE (ML) INJECTION PRN
OUTPATIENT
Start: 2023-06-15

## 2023-01-12 RX ORDER — METHYLPREDNISOLONE SODIUM SUCCINATE 125 MG/2ML
100 INJECTION, POWDER, LYOPHILIZED, FOR SOLUTION INTRAMUSCULAR; INTRAVENOUS ONCE
Status: CANCELLED | OUTPATIENT
Start: 2023-06-15

## 2023-01-12 RX ORDER — DIPHENHYDRAMINE HYDROCHLORIDE 50 MG/ML
25 INJECTION INTRAMUSCULAR; INTRAVENOUS ONCE
Status: CANCELLED | OUTPATIENT
Start: 2023-06-15 | End: 2023-06-15

## 2023-01-12 RX ORDER — DIPHENHYDRAMINE HYDROCHLORIDE 50 MG/ML
25 INJECTION INTRAMUSCULAR; INTRAVENOUS ONCE
OUTPATIENT
Start: 2023-06-15 | End: 2023-06-15

## 2023-01-12 RX ORDER — ACETAMINOPHEN 325 MG/1
650 TABLET ORAL ONCE
Status: CANCELLED | OUTPATIENT
Start: 2023-06-15 | End: 2023-06-15

## 2023-01-12 RX ORDER — EPINEPHRINE 1 MG/ML
0.3 INJECTION, SOLUTION, CONCENTRATE INTRAVENOUS PRN
OUTPATIENT
Start: 2023-06-15

## 2023-01-12 RX ORDER — SODIUM CHLORIDE 9 MG/ML
INJECTION, SOLUTION INTRAVENOUS CONTINUOUS
OUTPATIENT
Start: 2023-06-15

## 2023-01-12 RX ORDER — FAMOTIDINE 10 MG/ML
20 INJECTION, SOLUTION INTRAVENOUS
OUTPATIENT
Start: 2023-06-15

## 2023-01-12 RX ORDER — ACETAMINOPHEN 325 MG/1
650 TABLET ORAL
OUTPATIENT
Start: 2023-06-15

## 2023-01-12 RX ORDER — ONDANSETRON 2 MG/ML
8 INJECTION INTRAMUSCULAR; INTRAVENOUS
OUTPATIENT
Start: 2023-06-15

## 2023-01-12 RX ORDER — METHYLPREDNISOLONE SODIUM SUCCINATE 125 MG/2ML
100 INJECTION, POWDER, LYOPHILIZED, FOR SOLUTION INTRAMUSCULAR; INTRAVENOUS ONCE
OUTPATIENT
Start: 2023-06-15

## 2023-01-12 RX ORDER — DIPHENHYDRAMINE HYDROCHLORIDE 50 MG/ML
25 INJECTION INTRAMUSCULAR; INTRAVENOUS ONCE
Status: COMPLETED | OUTPATIENT
Start: 2023-01-12 | End: 2023-01-12

## 2023-01-12 RX ORDER — ALBUTEROL SULFATE 90 UG/1
4 AEROSOL, METERED RESPIRATORY (INHALATION) PRN
OUTPATIENT
Start: 2023-06-15

## 2023-01-12 RX ORDER — ACETAMINOPHEN 325 MG/1
650 TABLET ORAL ONCE
OUTPATIENT
Start: 2023-06-15 | End: 2023-06-15

## 2023-01-12 RX ORDER — HEPARIN SODIUM (PORCINE) LOCK FLUSH IV SOLN 100 UNIT/ML 100 UNIT/ML
500 SOLUTION INTRAVENOUS PRN
OUTPATIENT
Start: 2023-06-15

## 2023-01-12 RX ORDER — DIPHENHYDRAMINE HYDROCHLORIDE 50 MG/ML
50 INJECTION INTRAMUSCULAR; INTRAVENOUS
OUTPATIENT
Start: 2023-06-15

## 2023-01-12 RX ADMIN — SODIUM CHLORIDE 150 ML/HR: 9 INJECTION, SOLUTION INTRAVENOUS at 10:28

## 2023-01-12 RX ADMIN — METHYLPREDNISOLONE SODIUM SUCCINATE 100 MG: 125 INJECTION, POWDER, FOR SOLUTION INTRAMUSCULAR; INTRAVENOUS at 10:31

## 2023-01-12 RX ADMIN — ACETAMINOPHEN 650 MG: 325 TABLET ORAL at 10:28

## 2023-01-12 RX ADMIN — OCRELIZUMAB 300 MG: 300 INJECTION INTRAVENOUS at 10:57

## 2023-01-12 RX ADMIN — DIPHENHYDRAMINE HYDROCHLORIDE 25 MG: 50 INJECTION, SOLUTION INTRAMUSCULAR; INTRAVENOUS at 10:29

## 2023-01-12 NOTE — PROGRESS NOTES
Patient arrived for ocrevus. Infusion completed without issue. 1 hour post observation done. Pt stable at discharge. Scheduled to return 7/13/23 for ocrevus.

## 2023-01-30 ENCOUNTER — TELEPHONE (OUTPATIENT)
Dept: NEUROLOGY | Age: 43
End: 2023-01-30

## 2023-03-02 ENCOUNTER — TELEPHONE (OUTPATIENT)
Dept: NEUROLOGY | Age: 43
End: 2023-03-02

## 2023-03-02 NOTE — TELEPHONE ENCOUNTER
03 02 2023 called patient 3 times to reschedule appointment on 03 30 2023, LMOM 2 times and number out of service the third time, no response, cancelled appointment, and mailed letter.   KS

## 2023-03-02 NOTE — TELEPHONE ENCOUNTER
Gerald Ferrer called the office this morning with complaints of headache, cough, congestion and sneezing. Patient stated that she has had these symptoms on and off for quite some time. Advised patient to contact her PCP. Gerald Ferrer stated that she did leave a message but hadn't gotten a return call yet. I advised that she call them again. Patient verbally stated her understanding.

## 2023-03-06 ENCOUNTER — OFFICE VISIT (OUTPATIENT)
Dept: PRIMARY CARE CLINIC | Age: 43
End: 2023-03-06
Payer: COMMERCIAL

## 2023-03-06 VITALS
WEIGHT: 228.2 LBS | RESPIRATION RATE: 16 BRPM | SYSTOLIC BLOOD PRESSURE: 116 MMHG | TEMPERATURE: 98.7 F | DIASTOLIC BLOOD PRESSURE: 74 MMHG | BODY MASS INDEX: 35.82 KG/M2 | HEIGHT: 67 IN | HEART RATE: 67 BPM | OXYGEN SATURATION: 99 %

## 2023-03-06 DIAGNOSIS — J40 BRONCHITIS: ICD-10-CM

## 2023-03-06 DIAGNOSIS — Z13.220 LIPID SCREENING: ICD-10-CM

## 2023-03-06 DIAGNOSIS — Z76.89 ENCOUNTER TO ESTABLISH CARE: Primary | ICD-10-CM

## 2023-03-06 PROCEDURE — G8417 CALC BMI ABV UP PARAM F/U: HCPCS | Performed by: NURSE PRACTITIONER

## 2023-03-06 PROCEDURE — 1036F TOBACCO NON-USER: CPT | Performed by: NURSE PRACTITIONER

## 2023-03-06 PROCEDURE — 99204 OFFICE O/P NEW MOD 45 MIN: CPT | Performed by: NURSE PRACTITIONER

## 2023-03-06 PROCEDURE — G8484 FLU IMMUNIZE NO ADMIN: HCPCS | Performed by: NURSE PRACTITIONER

## 2023-03-06 PROCEDURE — G8427 DOCREV CUR MEDS BY ELIG CLIN: HCPCS | Performed by: NURSE PRACTITIONER

## 2023-03-06 RX ORDER — AZITHROMYCIN 250 MG/1
250 TABLET, FILM COATED ORAL SEE ADMIN INSTRUCTIONS
Qty: 6 TABLET | Refills: 0 | Status: SHIPPED | OUTPATIENT
Start: 2023-03-06 | End: 2023-03-11

## 2023-03-06 RX ORDER — BENZONATATE 100 MG/1
100-200 CAPSULE ORAL 3 TIMES DAILY PRN
Qty: 60 CAPSULE | Refills: 0 | Status: SHIPPED | OUTPATIENT
Start: 2023-03-06 | End: 2023-03-13

## 2023-03-06 SDOH — ECONOMIC STABILITY: INCOME INSECURITY: HOW HARD IS IT FOR YOU TO PAY FOR THE VERY BASICS LIKE FOOD, HOUSING, MEDICAL CARE, AND HEATING?: NOT HARD AT ALL

## 2023-03-06 SDOH — ECONOMIC STABILITY: FOOD INSECURITY: WITHIN THE PAST 12 MONTHS, THE FOOD YOU BOUGHT JUST DIDN'T LAST AND YOU DIDN'T HAVE MONEY TO GET MORE.: NEVER TRUE

## 2023-03-06 SDOH — ECONOMIC STABILITY: FOOD INSECURITY: WITHIN THE PAST 12 MONTHS, YOU WORRIED THAT YOUR FOOD WOULD RUN OUT BEFORE YOU GOT MONEY TO BUY MORE.: NEVER TRUE

## 2023-03-06 SDOH — ECONOMIC STABILITY: HOUSING INSECURITY
IN THE LAST 12 MONTHS, WAS THERE A TIME WHEN YOU DID NOT HAVE A STEADY PLACE TO SLEEP OR SLEPT IN A SHELTER (INCLUDING NOW)?: NO

## 2023-03-06 ASSESSMENT — PATIENT HEALTH QUESTIONNAIRE - PHQ9
SUM OF ALL RESPONSES TO PHQ QUESTIONS 1-9: 0
SUM OF ALL RESPONSES TO PHQ9 QUESTIONS 1 & 2: 0
SUM OF ALL RESPONSES TO PHQ QUESTIONS 1-9: 0
2. FEELING DOWN, DEPRESSED OR HOPELESS: 0
1. LITTLE INTEREST OR PLEASURE IN DOING THINGS: 0

## 2023-03-06 NOTE — PROGRESS NOTES
[facility]  Yessenia Henning, [address] [facility]  [address]  Dept: [phone]  Dept Fax: [phone]      Name: Bernadine Baxter  : 1980         Chief Complaint:     Chief Complaint   Patient presents with    New Patient     Establish care. Previous PCP-Marleen Bains. Cough     X 1 month. Patient c/o persistent cough. History of Present Illness:      Bernadine Baxter is a 39 y.o.  female who presents with New Patient (Establish care. Previous PCP-Marleen Bains. ) and Cough (X 1 month. Patient c/o persistent cough. )      GRACIELA Riley is here today to establish care. She states she has previously been seen by Chela Oropeza. She states she has had a cough and congestion for a month. She states her cough is productive with purulent sputum. She has been having some wheezing. She denies a fever. She has rhinorrhea. She has improved sore throat and headache. She has not taken any OTC medications. She has MS and was diagnosed 24 years ago. She does see [facility] in [facility] Dr. Skylar Hartman. She is on an infusion for her MS and states she has not had a relapse in years. She states her relapses usually start with visual changes. Past Medical History:     Past Medical History:   Diagnosis Date    Acute relapsing multiple sclerosis ([address]     Lumbar herniated disc 2014    back injury     MS (multiple sclerosis) ([facility] [address]       Reviewed all health maintenance requirements and ordered appropriate tests  There are no preventive care reminders to display for this patient. Past Surgical History:     Past Surgical History:   Procedure Laterality Date     SECTION      times 3    KNEE SURGERY Left     cyst removal        Medications:       Prior to Admission medications    Medication Sig Start Date End Date Taking?  Authorizing Provider   benzonatate (TESSALON) 100 MG capsule Take 1-2 capsules by mouth 3 times daily as needed for Cough 3/6/23 3/13/23 Yes Tapan Downdeepthi, APRN - CNP   azithromycin (ZITHROMAX) 250 MG tablet Take 1 tablet by mouth See Admin Instructions for 5 days 500mg on day 1 followed by 250mg on days 2 - 5 3/6/23 3/11/23 Yes Tapan Lloyddeepthi APRN - CNP   predniSONE (DELTASONE) 20 MG tablet Take 3 po qd x 3 days, 2 po qd x 3 days then 1 po qd x 3 days 11/16/22  Yes Dominik Gaspar APRN - CNP   VITAMIN A PO Take by mouth   Yes Historical Provider, MD   Multiple Vitamins-Minerals (MULTIVITAMIN ADULT PO) Take by mouth   Yes Historical Provider, MD   VITAMIN D PO Take 10,000 Units by mouth daily   Yes Historical Provider, MD   ibuprofen (ADVIL;MOTRIN) 200 MG tablet Take 200 mg by mouth every 6 hours as needed for Pain  Patient not taking: Reported on 3/6/2023    Historical Provider, MD   acetaminophen (TYLENOL) 500 MG tablet Take 1,000 mg by mouth every 6 hours as needed for Pain  Patient not taking: Reported on 3/6/2023    Historical Provider, MD        Allergies:       Patient has no known allergies. Social History:     Tobacco:    reports that she quit smoking about 12 years ago. Her smoking use included cigarettes. She has a 7.50 pack-year smoking history. She has never used smokeless tobacco.  Alcohol:      reports current alcohol use. Drug Use:  reports no history of drug use. Family History:     Family History   Adopted: Yes   Family history unknown: Yes       Review of Systems:     Positive and Negative as described in HPI    Review of Systems   Constitutional: Negative. HENT:  Positive for rhinorrhea. Eyes: Negative. Respiratory:  Positive for cough, shortness of breath and wheezing. Cardiovascular: Negative. Gastrointestinal: Negative. Endocrine: Negative. Genitourinary: Negative. Musculoskeletal: Negative. Skin: Negative. Allergic/Immunologic: Negative. Neurological: Negative. Hematological: Negative. Psychiatric/Behavioral: Negative.        Physical Exam:   Vitals:  BP 116/74   Pulse 67   Temp 98.7 °F (37.1 °C) (Temporal)   Resp 16   Ht 5' 7\" (1.702 m)   Wt 228 lb 3.2 oz (103.5 kg)   SpO2 99%   BMI 35.74 kg/m²     Physical Exam  Vitals and nursing note reviewed. Constitutional:       General: She is not in acute distress. Appearance: Normal appearance. She is obese. She is not ill-appearing. HENT:      Head: Normocephalic. Right Ear: Tympanic membrane normal.      Left Ear: Tympanic membrane normal.      Nose: Rhinorrhea present. Rhinorrhea is clear. Mouth/Throat:      Lips: Pink. Mouth: Mucous membranes are moist.      Pharynx: Oropharynx is clear. Eyes:      Conjunctiva/sclera: Conjunctivae normal.      Pupils: Pupils are equal, round, and reactive to light. Cardiovascular:      Rate and Rhythm: Normal rate and regular rhythm. Heart sounds: Normal heart sounds. No murmur heard. Pulmonary:      Effort: Pulmonary effort is normal.      Breath sounds: No decreased air movement. Examination of the left-upper field reveals wheezing. Examination of the left-lower field reveals wheezing. Wheezing (expiratory wheezing) present. No decreased breath sounds. Abdominal:      General: Bowel sounds are normal.      Palpations: Abdomen is soft. Musculoskeletal:         General: Normal range of motion. Cervical back: Normal range of motion and neck supple. Skin:     General: Skin is warm. Capillary Refill: Capillary refill takes less than 2 seconds. Neurological:      General: No focal deficit present. Mental Status: She is alert and oriented to person, place, and time. Psychiatric:         Mood and Affect: Mood normal.         Behavior: Behavior normal.         Thought Content:  Thought content normal.         Judgment: Judgment normal.       Data:     Lab Results   Component Value Date/Time     12/13/2022 02:04 PM    K 4.6 12/13/2022 02:04 PM     12/13/2022 02:04 PM    CO2 26 12/13/2022 02:04 PM    BUN 10 12/13/2022 02:04 PM    CREATININE 0.74 12/13/2022 02:04 PM    GLUCOSE 87 12/13/2022 02:04 PM    PROT 6.5 12/13/2022 02:04 PM    LABALBU 4.1 12/13/2022 02:04 PM    BILITOT 0.4 12/13/2022 02:04 PM    ALKPHOS 102 12/13/2022 02:04 PM    AST 24 12/13/2022 02:04 PM    ALT 27 12/13/2022 02:04 PM     Lab Results   Component Value Date/Time    WBC 2.6 12/13/2022 02:04 PM    RBC 4.10 12/13/2022 02:04 PM    HGB 12.8 12/13/2022 02:04 PM    HCT 37.6 12/13/2022 02:04 PM    MCV 91.7 12/13/2022 02:04 PM    MCH 31.2 12/13/2022 02:04 PM    MCHC 34.0 12/13/2022 02:04 PM    RDW 12.4 12/13/2022 02:04 PM     12/13/2022 02:04 PM    MPV 9.9 12/13/2022 02:04 PM     Lab Results   Component Value Date/Time    TSH 1.33 05/05/2022 03:39 PM     Lab Results   Component Value Date/Time    CHOL 226 05/05/2022 03:39 PM    HDL 44 05/05/2022 03:39 PM       Assessment/Plan:      Diagnosis Orders   1. Encounter to establish care        2. Bronchitis  benzonatate (TESSALON) 100 MG capsule    azithromycin (ZITHROMAX) 250 MG tablet      3. Lipid screening  Lipid Panel        Practice meticulous handwashing and cover cough to prevent spread of infection  Encouraged to increase fluids and rest  Tylenol/Ibuprofen OTC PRN for pain, discomfort or fever as directed on package  Warm salt water gargles for sore throat  Cool mist humidifier  Hot tea with honey and lemon for cough and sore throat PRN  Start Zithromax as prescribed. Recommend Mucinex as directed on package. May use Tessalon as needed for cough. Lipid panel order to get with next set of labs ordered by Neurology and will call with results. 1.  Leatha received counseling on the following healthy behaviors: nutrition, exercise, and medication adherence  2. Patient given educational materials - see patient instructions  3. Was a self-tracking handout given in paper form or via [employer]? No  If yes, see orders or list here. 4.  Discussed use, benefit, and side effects of prescribed medications. Barriers to medication compliance addressed. All patient questions answered. Pt voiced understanding. 5.  Reviewed prior labs and health maintenance  6. Continue current medications, diet and exercise. Completed Refills   Requested Prescriptions     Signed Prescriptions Disp Refills    benzonatate (TESSALON) 100 MG capsule 60 capsule 0     Sig: Take 1-2 capsules by mouth 3 times daily as needed for Cough    azithromycin (ZITHROMAX) 250 MG tablet 6 tablet 0     Sig: Take 1 tablet by mouth See Admin Instructions for 5 days 500mg on day 1 followed by 250mg on days 2 - 5         Return in about 1 year (around 3/6/2024).

## 2023-03-06 NOTE — PATIENT INSTRUCTIONS
SURVEY:     You may be receiving a survey from [employer] regarding your visit today. Please complete the survey to enable us to provide the highest quality of care to you and your family. If you cannot score us a very good on any question, please call the office to discuss how we could have made your experience a very good one.      Thank you,    Yifan Patel, APRN-SPENCER Zuleta, APRN-CNP  Quinton Ling, RAJANN  Carolina Wheeler, JOY Briceno, JOY Wallace, CMA  Blessing, PCA  Sharonda, PM

## 2023-03-08 ASSESSMENT — ENCOUNTER SYMPTOMS
COUGH: 1
GASTROINTESTINAL NEGATIVE: 1
EYES NEGATIVE: 1
ALLERGIC/IMMUNOLOGIC NEGATIVE: 1
WHEEZING: 1
SHORTNESS OF BREATH: 1
RHINORRHEA: 1

## 2023-03-16 ENCOUNTER — TELEMEDICINE (OUTPATIENT)
Dept: NEUROLOGY | Age: 43
End: 2023-03-16
Payer: COMMERCIAL

## 2023-03-16 DIAGNOSIS — E55.9 VITAMIN D DEFICIENCY: Primary | ICD-10-CM

## 2023-03-16 DIAGNOSIS — G35 MULTIPLE SCLEROSIS (HCC): ICD-10-CM

## 2023-03-16 PROCEDURE — G8484 FLU IMMUNIZE NO ADMIN: HCPCS | Performed by: NURSE PRACTITIONER

## 2023-03-16 PROCEDURE — G8417 CALC BMI ABV UP PARAM F/U: HCPCS | Performed by: NURSE PRACTITIONER

## 2023-03-16 PROCEDURE — 99214 OFFICE O/P EST MOD 30 MIN: CPT | Performed by: NURSE PRACTITIONER

## 2023-03-16 PROCEDURE — 1036F TOBACCO NON-USER: CPT | Performed by: NURSE PRACTITIONER

## 2023-03-16 PROCEDURE — G8427 DOCREV CUR MEDS BY ELIG CLIN: HCPCS | Performed by: NURSE PRACTITIONER

## 2023-03-16 NOTE — PROGRESS NOTES
[facility]  [address], [place], [address] [facility]  Phone: [phone]  Fax: [phone]    MD Shanika Berry MD Orlin Corolla, MD Marcella Potash, SPENCER    TELEHEALTH VISIT        3/16/2023      HISTORY OF PRESENT ILLNESS:       I had the pleasure of seeing Colby Gore, who returns via Telehealth for continued neurologic care. The patient was seen last on September 6, 2022 for treatment of relapsing remitting multiple sclerosis and a vitamin D deficiency. The patient has relapsing remitting multiple sclerosis and was switched to [address] [place] [address] for disease modifying therapy in December 2022 due to lymphopenia. MRIs of the cervical and thoracic spine were recommended but she declined The patient contacted the office in November 2022 with daily headaches with dizziness, fatigue and bilateral leg numbness and was prescribed a steroid taper. She has continued having bilateral leg numbness which has not worsened. She has upper respiratory symptoms and fatigue which has been present for the past month. She received Ocrevus infusions in December 2022 and January 2023. DISEASE SUMMARY  Date of onset: 1998 (R sensory loss and dizziness)  Date of diagnosis of MS: 1998  Disease course at onset: Relapsing-Remitting  Current disease course: Relapsing-Remitting  Previous disease therapies: IVMP, Betaseron ( [id number], pregnancy), Avonex (2009, took for 3-4 mos, flu-like symptoms), copaxone, mayzent (8/2020-11/2022, lymphopenia)   Current disease therapy: [address] [place] [address] (12/2022-present)  CSF: N/A  JCV serology result and date: N/A  Vitamin D: 77.2 (5/2022)    The patient also has a vitamin D deficiency and takes over the counter vitamin D replacement at 10,000 units daily. Testing Reviewed:    MRI Brain W WO Contrast 8/31/2022  Impression   No acute intracranial abnormality. No mass effect or abnormal intracranial   enhancement.        Patchy areas of T2/FLAIR hyperintensity in the white matter, likely related   to reported history of demyelinating disease. No new lesion or associated   postcontrast enhancement to suggest active demyelination. Labs Completed 6/30/2022  ALT 5 - 33 U/L 32  48 High     AST <32 U/L 24         Absolute Lymph # 1.10 - 3.70 k/uL 0.34 Low    . Color, UA Yellow Yellow    Turbidity UA Clear Clear    Glucose, Ur NEGATIVE NEGATIVE    Bilirubin Urine NEGATIVE NEGATIVE    Ketones, Urine NEGATIVE NEGATIVE    Specific Gravity, UA 1.010 - 1.020 <1.005 Low     Urine Hgb NEGATIVE NEGATIVE    pH, UA 5.0 - 9.0 6.0    Protein, UA NEGATIVE NEGATIVE    Urobilinogen, Urine Normal Normal    Nitrite, Urine NEGATIVE POSITIVE Abnormal     Leukocyte Esterase, Urine NEGATIVE TRACE Abnormal        WBC, UA 0 - 5 /HPF 0 TO 2    RBC, UA 0 - 2 /HPF 0 TO 2    Epithelial Cells UA 0 - 25 /HPF 0 TO 2    Bacteria, UA None 4+ Abnormal        Labs Completed 5/5/2022  Vit D, 25-Hydroxy >29.9 ng/mL 77.2   MRI Brain and Thoracic Spine W WO Contrast 9/22/2021  Impression   MRI OF THE BRAIN WITH AND WITHOUT CONTRAST:       1. Innumerable white matter lesions consistent with patient's history of   multiple sclerosis. 2. A lesion in the left jackosn is smaller than was seen on the previous MRI   from 01/30/2020. The remainder are stable. 3. No new or actively demyelinating plaque is seen. MRI OF THE THORACIC SPINE WITH AND WITHOUT CONTRAST:       1. Unremarkable MRI of the thoracic spine. 2. Unremarkable thoracic cord. No evidence of demyelinating disease. MRI brain w and w/o 1/30/2020:   Multifocal high T2 and FLAIR signal in the parenchyma as described. There   are numerous areas of corresponding dark T1 signal compatible with   vacuolization. There are no areas of restricted diffusion signal and there   are no areas of enhancement to suggest recent plaque activity.   There are a   few areas of T2 shine through   MRI C spine w and w/o 1/30/2020:   Multilevel degenerative disc disease and mild multilevel degenerative facet   hypertrophy without canal stenosis or foraminal narrowing. PAST MEDICAL HISTORY:         Diagnosis Date    Acute relapsing multiple sclerosis (HCC)     Lumbar herniated disc 2014    back injury     MS (multiple sclerosis) ([facility] [address]         PAST SURGICAL HISTORY:         Procedure Laterality Date     SECTION      times 3    KNEE SURGERY Left     cyst removal        SOCIAL HISTORY:     Social History     Socioeconomic History    Marital status:      Spouse name: Not on file    Number of children: Not on file    Years of education: Not on file    Highest education level: Not on file   Occupational History    Not on file   Tobacco Use    Smoking status: Former     Packs/day: 0.50     Years: 15.00     Pack years: 7.50     Types: Cigarettes     Quit date: 2010     Years since quittin.8    Smokeless tobacco: Never   Vaping Use    Vaping Use: Never used   Substance and Sexual Activity    Alcohol use: Yes     Comment: occasionally    Drug use: Never    Sexual activity: Yes     Partners: Male   Other Topics Concern    Not on file   Social History Narrative    Not on file     Social Determinants of Health     Financial Resource Strain: Low Risk     Difficulty of Paying Living Expenses: Not hard at all   Food Insecurity: No Food Insecurity    Worried About Running Out of Food in the Last Year: Never true    [address] in the Last Year: Never true   Transportation Needs: Unknown    Lack of Transportation (Medical): Not on file    Lack of Transportation (Non-Medical):  No   Physical Activity: Not on file   Stress: Not on file   Social Connections: Not on file   Intimate Partner Violence: Not on file   Housing Stability: Unknown    Unable to Pay for Housing in the Last Year: Not on file    Number of Places Lived in the Last Year: Not on file    Unstable Housing in the Last Year: No       CURRENT MEDICATIONS:     Current Outpatient Medications   Medication Sig Dispense Refill    VITAMIN A PO Take by mouth      Multiple Vitamins-Minerals (MULTIVITAMIN ADULT PO) Take by mouth      ibuprofen (ADVIL;MOTRIN) 200 MG tablet Take 200 mg by mouth every 6 hours as needed for Pain      acetaminophen (TYLENOL) 500 MG tablet Take 1,000 mg by mouth every 6 hours as needed for Pain      VITAMIN D PO Take 10,000 Units by mouth daily      predniSONE (DELTASONE) 20 MG tablet Take 3 po qd x 3 days, 2 po qd x 3 days then 1 po qd x 3 days (Patient not taking: Reported on 3/16/2023) 18 tablet 0     No current facility-administered medications for this visit. ALLERGIES:   No Known Allergies                          REVIEW OF SYSTEMS                   All items selected indicate a positive finding. Those items not selected are negative.   Constitutional [] Weight loss/gain   [x] Fatigue  [] Fever/Chills   HEENT [] Hearing Loss  [] Visual Disturbance  [] Tinnitus  [] Eye pain   Respiratory [] Shortness of Breath  [x] Cough  [] Snoring   Cardiovascular [] Chest Pain  [] Palpitations  [] Lightheaded   GI [] Constipation  [] Diarrhea  [] Swallowing change  [] Nausea/vomiting    [] Urinary Frequency  [] Urinary Urgency   Musculoskeletal [] Neck pain  [] Back pain  [] Muscle pain  [] Restless legs   Dermatologic [] Skin changes   Neurologic [] Memory loss/confusion  [] Seizures  [] Trouble walking or imbalance  [] Dizziness  [] Sleep disturbance  [] Weakness  [x] Numbness  [] Tremors  [] Speech Difficulty  [] Headaches  [] Light Sensitivity  [] Sound Sensitivity   Endocrinology []Excessive thirst  []Excessive hunger   Psychiatric [] Anxiety/Depression  [] Hallucination   Allergy/immunology []Hives/environmental allergies   Hematologic/lymph [] Abnormal bleeding  [] Abnormal bruising          PHYSICAL EXAMINATION:                                         .                                                                                                    General Appearance:  Alert, cooperative, no signs of distress, appears stated age   Head:  Normocephalic, no signs of trauma   Eyes:  Conjunctiva/corneas clear;  eyelids intact   Ears:  Normal external ear and canals   Nose: Nares normal, no drainage    Throat: Lips and tongue normal; teeth normal;  gums normal   Extremities: Extremities normal, no cyanosis, no edema   Skin: Skin color, texture normal, no rashes, no lesions                                     NEUROLOGIC EXAMINATION      Mental status    Alert and oriented x 3; able to follow commands, speech and language intact; no hallucinations or delusions  Fund of information appropriate for level of education    Cranial nerves    II - grossly intact  III, IV, VI - extra-ocular muscles full: no nystagmus, no ptosis   V - normal facial sensation                                                               VII - normal facial symmetry                                                             VIII - intact hearing                                                                             IX, X - symmetrical palate                                                                  XI - symmetrical shoulder shrug                                                       XII - tongue midline without atrophy      Motor function  Normal muscle bulk. Strength at least 5/5 on all 4 extremities, no pronator drift      Sensory function Unable to test      Cerebellar Intact fine motor movement. No involuntary movements or tremors. No ataxia or dysmetria on finger to nose or heel to shin testing      Reflex function Unable to test      Gait                   normal base and arm swing              ASSESSMENT AND PLAN:     This is a telehealth visit that was performed with the originating site at Patient Location: home and Provider Location of [place], [place]. Verbal consent to participate in video visit was obtained.  Pursuant to the emergency declaration under the [address] and the National Emergencies Act, [facility] waiver authority and the [employer] Act, this Virtual Visit was conducted, with patient's consent, to reduce the patient's risk of exposure to COVID-19 and provide continuity of care for an established/new patient. Services were provided through a video synchronous discussion virtually to substitute for in-person clinic visit. I discussed with the patient the nature of our telehealth visits via interactive/real-time audio/video that:  - I would evaluate the patient and recommend diagnostics and treatments based on my assessment  - Our sessions are not being recorded and that personal health information is protected  - Our team would provide follow up care in person if/when the patient needs it. In summary, your patient, Dev Vargas exhibits the following, with associated plan:    Relapsing remitting multiple sclerosis with last exacerbation in November 2022. She has also been having visual changes which is concerning for possible visual migraines however she does not typically get a headache with the visual changes. She has recently been having pain and numbness in her bilateral lower extremities which is concerning for a possible spinal lesion. She endorses worsening fatigue at today's visit. Continue ocrevus infusions for disease modifying therapy   The patient was switched to ocrevus from [facility] in December 2022 due to lymphopenia.    Obtain a CBC with differential   Continue to follow with opthalmology  Dicussed starting provigil at today's visit however she declined at this time  Vitamin D deficiency  Continue vitamin D supplementation  Vitamin D level was normal in May 2022 at 77.2  Return for follow up visit in 6 months, encouraged her to schedule an in office visit        Signed: Dinora Ontiveros, [address]    Please note that this chart was generated using voice recognition Dragon dictation software. Although every effort was made to ensure the accuracy of this automated transcription, some errors in transcription may have occurred. Provider Attestation: The documentation recorded by the scribe accurately reflects the service I personally performed and the decisions made by myself. Portions of this note were transcribed by a scribe. I personally performed the history, physical exam, and medical decision-making and confirm the accuracy of the information in the transcribed note. Scribe Attestation:   By signing my name below, Manuel Villarreal, attest that this documentation has been prepared under the direction and in the presence of Libby Martinez CNP.

## 2023-03-22 ENCOUNTER — HOSPITAL ENCOUNTER (OUTPATIENT)
Age: 43
Discharge: HOME | End: 2023-03-22
Payer: COMMERCIAL

## 2023-03-22 DIAGNOSIS — Z13.220 LIPID SCREENING: ICD-10-CM

## 2023-03-22 LAB
CHOLEST SERPL-MCNC: 216 MG/DL
CHOLESTEROL/HDL RATIO: 4.4
HDLC SERPL-MCNC: 49 MG/DL
LDLC SERPL CALC-MCNC: 144 MG/DL (ref 0–130)
TRIGL SERPL-MCNC: 116 MG/DL

## 2023-03-22 PROCEDURE — 36415 COLL VENOUS BLD VENIPUNCTURE: CPT

## 2023-03-22 PROCEDURE — 80061 LIPID PANEL: CPT

## 2023-03-23 ENCOUNTER — TELEPHONE (OUTPATIENT)
Dept: PRIMARY CARE CLINIC | Age: 43
End: 2023-03-23

## 2023-03-23 NOTE — TELEPHONE ENCOUNTER
----- Message from POLY Rivera CNP sent at 3/23/2023  3:52 PM EDT -----  Please notify patient of lab results. Lipids and cholesterol are borderline elevated. Continue with lifestyle modifications. We will continue to monitor.   Thanks Stormy

## 2023-04-05 ENCOUNTER — OFFICE VISIT (OUTPATIENT)
Dept: PRIMARY CARE CLINIC | Age: 43
End: 2023-04-05
Payer: COMMERCIAL

## 2023-04-05 VITALS
HEART RATE: 92 BPM | TEMPERATURE: 98.3 F | WEIGHT: 232 LBS | DIASTOLIC BLOOD PRESSURE: 70 MMHG | OXYGEN SATURATION: 98 % | BODY MASS INDEX: 36.34 KG/M2 | RESPIRATION RATE: 16 BRPM | SYSTOLIC BLOOD PRESSURE: 118 MMHG

## 2023-04-05 DIAGNOSIS — J40 BRONCHITIS: Primary | ICD-10-CM

## 2023-04-05 DIAGNOSIS — J01.40 ACUTE NON-RECURRENT PANSINUSITIS: ICD-10-CM

## 2023-04-05 PROCEDURE — G8417 CALC BMI ABV UP PARAM F/U: HCPCS | Performed by: NURSE PRACTITIONER

## 2023-04-05 PROCEDURE — G8427 DOCREV CUR MEDS BY ELIG CLIN: HCPCS | Performed by: NURSE PRACTITIONER

## 2023-04-05 PROCEDURE — 99214 OFFICE O/P EST MOD 30 MIN: CPT | Performed by: NURSE PRACTITIONER

## 2023-04-05 PROCEDURE — 1036F TOBACCO NON-USER: CPT | Performed by: NURSE PRACTITIONER

## 2023-04-05 RX ORDER — BENZONATATE 100 MG/1
100-200 CAPSULE ORAL 3 TIMES DAILY PRN
Qty: 60 CAPSULE | Refills: 0 | Status: SHIPPED | OUTPATIENT
Start: 2023-04-05 | End: 2023-04-12

## 2023-04-05 RX ORDER — AMOXICILLIN AND CLAVULANATE POTASSIUM 875; 125 MG/1; MG/1
1 TABLET, FILM COATED ORAL 2 TIMES DAILY
Qty: 20 TABLET | Refills: 0 | Status: SHIPPED | OUTPATIENT
Start: 2023-04-05 | End: 2023-04-15

## 2023-04-05 ASSESSMENT — ENCOUNTER SYMPTOMS
SORE THROAT: 1
COUGH: 1
RHINORRHEA: 1
SHORTNESS OF BREATH: 1
GASTROINTESTINAL NEGATIVE: 1
SINUS PAIN: 1
EYES NEGATIVE: 1
CHEST TIGHTNESS: 1
ALLERGIC/IMMUNOLOGIC NEGATIVE: 1
SINUS PRESSURE: 1
WHEEZING: 0

## 2023-04-05 ASSESSMENT — PATIENT HEALTH QUESTIONNAIRE - PHQ9
1. LITTLE INTEREST OR PLEASURE IN DOING THINGS: 0
2. FEELING DOWN, DEPRESSED OR HOPELESS: 0
SUM OF ALL RESPONSES TO PHQ QUESTIONS 1-9: 0
SUM OF ALL RESPONSES TO PHQ9 QUESTIONS 1 & 2: 0

## 2023-04-05 NOTE — LETTER
April 5, 2023       Richy Glasgow YOB: 1980   [address] Date of Visit:  4/5/2023       To Whom It May Concern: It is my medical opinion that Bill Gil may return to work on 4/6/2023. If you have any questions or concerns, please don't hesitate to call.     Sincerely,        Shanice Dozier, POLY - CNP

## 2023-04-05 NOTE — PROGRESS NOTES
Negative. Psychiatric/Behavioral: Negative. Physical Exam:   Vitals:  /70   Pulse 92   Temp 98.3 °F (36.8 °C) (Temporal)   Resp 16   Wt 232 lb (105.2 kg)   SpO2 98%   BMI 36.34 kg/m²     Physical Exam  Vitals and nursing note reviewed. Constitutional:       Appearance: Normal appearance. She is ill-appearing. HENT:      Head: Normocephalic. Right Ear: External ear normal.      Left Ear: External ear normal.      Nose: Mucosal edema, congestion and rhinorrhea present. Rhinorrhea is clear. Right Sinus: Maxillary sinus tenderness and frontal sinus tenderness present. Left Sinus: Maxillary sinus tenderness and frontal sinus tenderness present. Mouth/Throat:      Lips: Pink. Pharynx: Oropharyngeal exudate and posterior oropharyngeal erythema present. Eyes:      Extraocular Movements: Extraocular movements intact. Conjunctiva/sclera: Conjunctivae normal.      Pupils: Pupils are equal, round, and reactive to light. Cardiovascular:      Rate and Rhythm: Normal rate and regular rhythm. Heart sounds: Normal heart sounds. No murmur heard. Pulmonary:      Effort: Pulmonary effort is normal.      Breath sounds: Normal breath sounds. Comments: Harsh cough with deep inspiration    Musculoskeletal:         General: Normal range of motion. Cervical back: Normal range of motion and neck supple. Lymphadenopathy:      Cervical: No cervical adenopathy. Skin:     General: Skin is warm. Capillary Refill: Capillary refill takes less than 2 seconds. Neurological:      General: No focal deficit present. Mental Status: She is alert and oriented to person, place, and time. Psychiatric:         Mood and Affect: Mood normal.         Behavior: Behavior normal.         Thought Content:  Thought content normal.         Judgment: Judgment normal.       Data:     Lab Results   Component Value Date/Time     12/13/2022 02:04 PM    K 4.6 12/13/2022 02:04 PM

## 2023-04-05 NOTE — PATIENT INSTRUCTIONS
SURVEY:     You may be receiving a survey from [employer] regarding your visit today. Please complete the survey to enable us to provide the highest quality of care to you and your family. If you cannot score us a very good on any question, please call the office to discuss how we could have made your experience a very good one.      Thank you,    Alfredo Alvarez, APRN-CNP  Janel Davis, APRN-CNP  Maxi Abad, CORKY Hobbs, JOY Rogers, JOY Wallace, CMA  Blessing, FRANDY Mcdonough, PM

## 2023-04-25 ENCOUNTER — OFFICE VISIT (OUTPATIENT)
Dept: PRIMARY CARE CLINIC | Age: 43
End: 2023-04-25
Payer: COMMERCIAL

## 2023-04-25 VITALS
RESPIRATION RATE: 18 BRPM | DIASTOLIC BLOOD PRESSURE: 74 MMHG | TEMPERATURE: 97.4 F | BODY MASS INDEX: 35.49 KG/M2 | HEART RATE: 86 BPM | SYSTOLIC BLOOD PRESSURE: 122 MMHG | WEIGHT: 226.6 LBS | OXYGEN SATURATION: 99 %

## 2023-04-25 DIAGNOSIS — B35.4 TINEA CORPORIS: Primary | ICD-10-CM

## 2023-04-25 PROCEDURE — 1036F TOBACCO NON-USER: CPT | Performed by: NURSE PRACTITIONER

## 2023-04-25 PROCEDURE — G8417 CALC BMI ABV UP PARAM F/U: HCPCS | Performed by: NURSE PRACTITIONER

## 2023-04-25 PROCEDURE — 99213 OFFICE O/P EST LOW 20 MIN: CPT | Performed by: NURSE PRACTITIONER

## 2023-04-25 PROCEDURE — G8427 DOCREV CUR MEDS BY ELIG CLIN: HCPCS | Performed by: NURSE PRACTITIONER

## 2023-04-25 RX ORDER — CLOTRIMAZOLE AND BETAMETHASONE DIPROPIONATE 10; .64 MG/G; MG/G
CREAM TOPICAL
Qty: 45 G | Refills: 0 | Status: SHIPPED | OUTPATIENT
Start: 2023-04-25

## 2023-04-25 RX ORDER — FLUCONAZOLE 150 MG/1
150 TABLET ORAL WEEKLY
Qty: 1 TABLET | Refills: 0 | Status: SHIPPED | OUTPATIENT
Start: 2023-04-25 | End: 2023-05-10

## 2023-04-25 RX ORDER — HYDROXYZINE HYDROCHLORIDE 25 MG/1
25 TABLET, FILM COATED ORAL EVERY 6 HOURS
COMMUNITY
Start: 2023-04-24

## 2023-04-25 ASSESSMENT — PATIENT HEALTH QUESTIONNAIRE - PHQ9
SUM OF ALL RESPONSES TO PHQ QUESTIONS 1-9: 0
SUM OF ALL RESPONSES TO PHQ9 QUESTIONS 1 & 2: 0
SUM OF ALL RESPONSES TO PHQ QUESTIONS 1-9: 0
2. FEELING DOWN, DEPRESSED OR HOPELESS: 0
SUM OF ALL RESPONSES TO PHQ QUESTIONS 1-9: 0
SUM OF ALL RESPONSES TO PHQ QUESTIONS 1-9: 0
1. LITTLE INTEREST OR PLEASURE IN DOING THINGS: 0

## 2023-04-25 NOTE — PATIENT INSTRUCTIONS
SURVEY:     You may be receiving a survey from [employer] regarding your visit today. Please complete the survey to enable us to provide the highest quality of care to you and your family. If you cannot score us a very good on any question, please call the office to discuss how we could have made your experience a very good one.      Thank you,    Jesenia Patel, APRN-CNP  Pauly Dias, APRN-CNP  Ana Wayne, CORKY Daley, JOY Douglass, JOY Wallace, CMA  Blessing, PCA  Sharonda, PM

## 2023-04-25 NOTE — PROGRESS NOTES
[facility]  Margaux Marti, [address] [facility]  [address]  Dept: [phone]  Dept Fax: [phone]      Name: Amilcar Huntley  : 1980         Chief Complaint:     Chief Complaint   Patient presents with    Rash     Patient c/o little patches of rashes located on back, arms, and chest. Patient went to [facility] yesterday for this issue. Patient was given medication. History of Present Illness:      Amilcar Huntley is a 39 y.o.  female who presents with Rash (Patient c/o little patches of rashes located on back, arms, and chest. Patient went to [facility] yesterday for this issue. Patient was given medication. )      GRACIELA Diaz is here today for a rash. She has a rash that has been spreading over a week. She started with a rash on her breast that has spread to her arms, chest, and back. She states the rash is itching. She was given Diflucan in the ER yesterday and hydroxyzine for itching. She states she does have animals at home and stray cats at home that she may have contacted them from. Past Medical History:     Past Medical History:   Diagnosis Date    Acute relapsing multiple sclerosis ([facility] [address]     Lumbar herniated disc 2014    back injury     MS (multiple sclerosis) ([facility] [address]       Reviewed all health maintenance requirements and ordered appropriate tests  There are no preventive care reminders to display for this patient. Past Surgical History:     Past Surgical History:   Procedure Laterality Date     SECTION      times 3    KNEE SURGERY Left     cyst removal        Medications:       Prior to Admission medications    Medication Sig Start Date End Date Taking? Authorizing Provider   hydrOXYzine HCl (ATARAX) 25 MG tablet Take 1 tablet by mouth in the morning and 1 tablet at noon and 1 tablet in the evening and 1 tablet before bedtime.  23  Yes Historical Provider, MD   fluconazole (DIFLUCAN) 150 MG

## 2023-05-18 ENCOUNTER — TELEPHONE (OUTPATIENT)
Dept: PRIMARY CARE CLINIC | Age: 43
End: 2023-05-18

## 2023-05-18 DIAGNOSIS — B35.4 TINEA CORPORIS: ICD-10-CM

## 2023-05-18 RX ORDER — CLOTRIMAZOLE AND BETAMETHASONE DIPROPIONATE 10; .64 MG/G; MG/G
CREAM TOPICAL
Qty: 45 G | Refills: 0 | Status: SHIPPED | OUTPATIENT
Start: 2023-05-18

## 2023-05-18 NOTE — TELEPHONE ENCOUNTER
Patient contacted the office in regards to her being seen on 4/25/23. Patient was unable to  the medications that were sent that day because she did not have enough money. Patient is developing new rashes and is unsure what to do. Please advise.

## 2023-05-23 ENCOUNTER — TELEPHONE (OUTPATIENT)
Dept: PRIMARY CARE CLINIC | Age: 43
End: 2023-05-23

## 2023-05-23 DIAGNOSIS — L30.9 DERMATITIS: Primary | ICD-10-CM

## 2023-05-23 RX ORDER — AMMONIUM LACTATE 12 G/100G
CREAM TOPICAL
Qty: 140 G | Refills: 1 | Status: SHIPPED | OUTPATIENT
Start: 2023-05-23 | End: 2023-06-22

## 2023-05-30 DIAGNOSIS — G35 MULTIPLE SCLEROSIS (HCC): Primary | ICD-10-CM

## 2023-06-01 ENCOUNTER — HOSPITAL ENCOUNTER (OUTPATIENT)
Age: 43
Discharge: HOME | End: 2023-06-01
Payer: COMMERCIAL

## 2023-06-01 ENCOUNTER — TELEPHONE (OUTPATIENT)
Dept: NEUROLOGY | Age: 43
End: 2023-06-01

## 2023-06-01 DIAGNOSIS — G35 MULTIPLE SCLEROSIS (HCC): ICD-10-CM

## 2023-06-01 LAB
ALBUMIN SERPL-MCNC: 4.6 G/DL (ref 3.5–5.2)
ALBUMIN/GLOB SERPL: 2 {RATIO} (ref 1–2.5)
ALP SERPL-CCNC: 118 U/L (ref 35–104)
ALT SERPL-CCNC: 12 U/L (ref 5–33)
ANION GAP SERPL CALCULATED.3IONS-SCNC: 11 MMOL/L (ref 9–17)
AST SERPL-CCNC: 19 U/L
BASOPHILS # BLD: <0.03 K/UL (ref 0–0.2)
BASOPHILS NFR BLD: 0 % (ref 0–2)
BILIRUB SERPL-MCNC: 0.3 MG/DL (ref 0.3–1.2)
BUN SERPL-MCNC: 10 MG/DL (ref 6–20)
BUN/CREAT SERPL: 12 (ref 9–20)
CALCIUM SERPL-MCNC: 9.2 MG/DL (ref 8.6–10.4)
CHLORIDE SERPL-SCNC: 110 MMOL/L (ref 98–107)
CO2 SERPL-SCNC: 23 MMOL/L (ref 20–31)
CREAT SERPL-MCNC: 0.81 MG/DL (ref 0.5–0.9)
EOSINOPHIL # BLD: 0.29 K/UL (ref 0–0.44)
EOSINOPHILS RELATIVE PERCENT: 6 % (ref 1–4)
ERYTHROCYTE [DISTWIDTH] IN BLOOD BY AUTOMATED COUNT: 12.9 % (ref 11.8–14.4)
GFR SERPL CREATININE-BSD FRML MDRD: >60 ML/MIN/1.73M2
GLUCOSE SERPL-MCNC: 86 MG/DL (ref 70–99)
HCT VFR BLD AUTO: 34.1 % (ref 36.3–47.1)
HGB BLD-MCNC: 11.4 G/DL (ref 11.9–15.1)
IMM GRANULOCYTES # BLD AUTO: <0.03 K/UL (ref 0–0.3)
IMM GRANULOCYTES NFR BLD: 0 %
LYMPHOCYTES # BLD: 24 % (ref 24–43)
LYMPHOCYTES NFR BLD: 1.12 K/UL (ref 1.1–3.7)
MCH RBC QN AUTO: 29.2 PG (ref 25.2–33.5)
MCHC RBC AUTO-ENTMCNC: 33.4 G/DL (ref 28.4–34.8)
MCV RBC AUTO: 87.2 FL (ref 82.6–102.9)
MONOCYTES NFR BLD: 0.43 K/UL (ref 0.1–1.2)
MONOCYTES NFR BLD: 9 % (ref 3–12)
NEUTROPHILS NFR BLD: 61 % (ref 36–65)
NEUTS SEG NFR BLD: 2.73 K/UL (ref 1.5–8.1)
NRBC AUTOMATED: 0 PER 100 WBC
PLATELET # BLD AUTO: 292 K/UL (ref 138–453)
PMV BLD AUTO: 10.9 FL (ref 8.1–13.5)
POTASSIUM SERPL-SCNC: 4.1 MMOL/L (ref 3.7–5.3)
PROT SERPL-MCNC: 6.9 G/DL (ref 6.4–8.3)
RBC # BLD AUTO: 3.91 M/UL (ref 3.95–5.11)
SODIUM SERPL-SCNC: 144 MMOL/L (ref 135–144)
WBC OTHER # BLD: 4.6 K/UL (ref 3.5–11.3)

## 2023-06-01 PROCEDURE — 85027 COMPLETE CBC AUTOMATED: CPT

## 2023-06-01 PROCEDURE — 36415 COLL VENOUS BLD VENIPUNCTURE: CPT

## 2023-06-01 PROCEDURE — 87077 CULTURE AEROBIC IDENTIFY: CPT

## 2023-06-01 PROCEDURE — 87086 URINE CULTURE/COLONY COUNT: CPT

## 2023-06-01 PROCEDURE — 87186 SC STD MICRODIL/AGAR DIL: CPT

## 2023-06-01 PROCEDURE — 80053 COMPREHEN METABOLIC PANEL: CPT

## 2023-06-03 LAB
MICROORGANISM SPEC CULT: ABNORMAL
SERVICE CMNT-IMP: ABNORMAL
SPECIMEN DESCRIPTION: ABNORMAL

## 2023-06-05 ENCOUNTER — TELEPHONE (OUTPATIENT)
Dept: NEUROLOGY | Age: 43
End: 2023-06-05

## 2023-06-05 RX ORDER — SULFAMETHOXAZOLE AND TRIMETHOPRIM 800; 160 MG/1; MG/1
1 TABLET ORAL 2 TIMES DAILY
Qty: 6 TABLET | Refills: 0 | Status: SHIPPED | OUTPATIENT
Start: 2023-06-05 | End: 2023-06-08

## 2023-06-05 NOTE — TELEPHONE ENCOUNTER
Tito Spencer CNP ordered some labs and they are in.  UA is abnormal. Can you please review patients results and advise. Thank you.

## 2023-06-27 ENCOUNTER — TELEPHONE (OUTPATIENT)
Dept: NEUROLOGY | Age: 43
End: 2023-06-27

## 2023-06-27 NOTE — TELEPHONE ENCOUNTER
Jane Barajas called back. Scheduled her for 7/12 with Dr. Calista Gutierrez but she is concerned about switch of physicians again. Will discuss with Adeola Gray RN mgr.

## 2023-06-27 NOTE — TELEPHONE ENCOUNTER
Matilda Hernandez has [employer]. [place] and cannot get her Ocrevus infusions through a center affiliated with a hosp. I called and lm that I need to talk with her. She will need to come in and be seen as she was [facility] CNP pt. I have a hold on 7/12 [place] with Dr. Manuel Mansfield.

## 2023-07-11 ENCOUNTER — TELEPHONE (OUTPATIENT)
Dept: NEUROLOGY | Age: 43
End: 2023-07-11

## 2023-07-12 ENCOUNTER — OFFICE VISIT (OUTPATIENT)
Dept: NEUROLOGY | Age: 43
End: 2023-07-12
Payer: COMMERCIAL

## 2023-07-12 VITALS
BODY MASS INDEX: 35 KG/M2 | HEIGHT: 67 IN | WEIGHT: 223 LBS | DIASTOLIC BLOOD PRESSURE: 92 MMHG | HEART RATE: 80 BPM | SYSTOLIC BLOOD PRESSURE: 136 MMHG

## 2023-07-12 DIAGNOSIS — G44.209 ACUTE NON INTRACTABLE TENSION-TYPE HEADACHE: ICD-10-CM

## 2023-07-12 DIAGNOSIS — R53.83 OTHER FATIGUE: ICD-10-CM

## 2023-07-12 DIAGNOSIS — G35 MULTIPLE SCLEROSIS, RELAPSING-REMITTING (HCC): Primary | ICD-10-CM

## 2023-07-12 DIAGNOSIS — R20.8 DYSESTHESIA: ICD-10-CM

## 2023-07-12 PROCEDURE — 99215 OFFICE O/P EST HI 40 MIN: CPT | Performed by: PSYCHIATRY & NEUROLOGY

## 2023-07-12 PROCEDURE — 1036F TOBACCO NON-USER: CPT | Performed by: PSYCHIATRY & NEUROLOGY

## 2023-07-12 PROCEDURE — G8417 CALC BMI ABV UP PARAM F/U: HCPCS | Performed by: PSYCHIATRY & NEUROLOGY

## 2023-07-12 PROCEDURE — G8427 DOCREV CUR MEDS BY ELIG CLIN: HCPCS | Performed by: PSYCHIATRY & NEUROLOGY

## 2023-07-12 RX ORDER — PREDNISONE 10 MG/1
TABLET ORAL
Qty: 20 TABLET | Refills: 0 | Status: SHIPPED | OUTPATIENT
Start: 2023-07-12

## 2023-07-12 RX ORDER — AMANTADINE HYDROCHLORIDE 100 MG/1
CAPSULE, GELATIN COATED ORAL
Qty: 60 CAPSULE | Refills: 3 | Status: SHIPPED | OUTPATIENT
Start: 2023-07-12

## 2023-07-12 RX ORDER — GABAPENTIN 100 MG/1
CAPSULE ORAL
Qty: 90 CAPSULE | Refills: 1 | Status: SHIPPED | OUTPATIENT
Start: 2023-07-12 | End: 2024-11-25

## 2023-07-12 NOTE — PROGRESS NOTES
NEUROLOGY CONSULT  Patient Name:       Alex Shore  :        1980  Clinic Visit Date:    2023        Dear Dr. Blas Damian, APRN - CNP     I had the opportunity to see your patient, Ms. Alex Shore in neurology consultation today. As you know she  is a She is a 39 y.o.  female  presented for establishment of continued neurologic care. , transitioning from care of recently retired colleague, [facility]. Extended time spent while reviewing prior medical records and prior diagnostic studies of the patient. Briefly, this is a  39 y.o. female with relapsing remitting multiple sclerosis and on IV Ocrevus disease modifying therapy since 2022. She has tolerated last IV infusion well and she wants to continue the same. Supposedly she is due for next iv infusion this month. But unfortunately she could not get insurance approval so far for IV infusion. She was told by insurance that she can get infusion at home. Patient wants infusion to be done at supervising facility. Patient has been extremely anxious about possibility of exacerbation of MS. She reports that she has ongoing fatigue and she declined any meds so far. As this has been worsening she is looking into medication options as conservative measures are not helping. She also has persistent pressure-like moderately severe holocephalic headache for >3 days with associated photophobia and nausea. She has tried OTC meds without any help. She also has ongoing blurred vision in both eyes and she has been following up with ophthalmologist at [facility].        MULTIPLE SCLEROSIS DISEASE SUMMARY  Date of onset:  (R sensory loss and dizziness)  Date of diagnosis of MS:   Disease course at onset: Relapsing-Remitting  Current disease course: Relapsing-Remitting  Previous disease therapies: IVMP, Betaseron ( [id number], pregnancy), Avonex (, took for 3-4 mos, flu-like symptoms), copaxone, mayzent

## 2023-07-18 NOTE — TELEPHONE ENCOUNTER
I sent Lida Springer with [employer] another message to find out if they were able to begin the process for Ocrevus infusions.

## 2023-07-28 ENCOUNTER — TELEPHONE (OUTPATIENT)
Dept: NEUROLOGY | Age: 43
End: 2023-07-28

## 2023-07-28 NOTE — TELEPHONE ENCOUNTER
[facility] called in complaining of dizziness and pin and needles feeling. She stated that she is having it all over her body. She is also experiencing tingling and some pains. She stated that she is having problems with her balance because of th dizziness. She is taking amantadine 100 mg one daily and Gabapentin 100 mg at night. She stated that this becoming     She had an infusion done Wednesday she is also seeing red dots near the infusion site. She stated that she was up all night the day before her infusion. She also stated that she is going to have to take off work due to this and she has been taking off some days the past few weeks.

## 2023-07-31 NOTE — TELEPHONE ENCOUNTER
Marquise Rai called the office back. Patient states that she is still feeling \"off\", not herself. She has been more unstable on her feet which has made her miss more work. 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 She is worried about missing work. Writer advised that she speak with her HR department about FMLA. Patient stated that she will do this.

## 2023-08-01 DIAGNOSIS — R20.2 PINS AND NEEDLES SENSATION: Primary | ICD-10-CM

## 2023-08-01 DIAGNOSIS — G35 MULTIPLE SCLEROSIS, RELAPSING-REMITTING (HCC): ICD-10-CM

## 2023-08-01 RX ORDER — GABAPENTIN 100 MG/1
CAPSULE ORAL
Qty: 270 CAPSULE | Refills: 1 | Status: SHIPPED | OUTPATIENT
Start: 2023-08-01 | End: 2023-12-29

## 2023-08-01 NOTE — TELEPHONE ENCOUNTER
Please let the patient know that she can increase the dose of gabapentin from 100 to 200 for 1 wk, then 300 at night to help for pins and needles sensations. I will send a new script for it.    Thank you.   -dr. Molly Pennington

## 2023-08-09 ENCOUNTER — HOSPITAL ENCOUNTER (EMERGENCY)
Age: 43
Discharge: HOME | End: 2023-08-09
Attending: EMERGENCY MEDICINE
Payer: COMMERCIAL

## 2023-08-09 ENCOUNTER — APPOINTMENT (OUTPATIENT)
Dept: CT IMAGING | Age: 43
End: 2023-08-09
Payer: COMMERCIAL

## 2023-08-09 ENCOUNTER — OFFICE VISIT (OUTPATIENT)
Dept: NEUROLOGY | Age: 43
End: 2023-08-09
Payer: COMMERCIAL

## 2023-08-09 ENCOUNTER — APPOINTMENT (OUTPATIENT)
Dept: GENERAL RADIOLOGY | Age: 43
End: 2023-08-09
Payer: COMMERCIAL

## 2023-08-09 VITALS
SYSTOLIC BLOOD PRESSURE: 120 MMHG | DIASTOLIC BLOOD PRESSURE: 83 MMHG | HEIGHT: 67 IN | WEIGHT: 220 LBS | TEMPERATURE: 98 F | BODY MASS INDEX: 34.53 KG/M2 | RESPIRATION RATE: 16 BRPM | HEART RATE: 88 BPM | OXYGEN SATURATION: 98 %

## 2023-08-09 VITALS
HEART RATE: 94 BPM | WEIGHT: 220 LBS | BODY MASS INDEX: 34.53 KG/M2 | HEIGHT: 67 IN | DIASTOLIC BLOOD PRESSURE: 75 MMHG | SYSTOLIC BLOOD PRESSURE: 113 MMHG

## 2023-08-09 DIAGNOSIS — R42 DIZZINESS: Primary | ICD-10-CM

## 2023-08-09 DIAGNOSIS — R42 DIZZINESS: ICD-10-CM

## 2023-08-09 DIAGNOSIS — R51.9 ACUTE INTRACTABLE HEADACHE, UNSPECIFIED HEADACHE TYPE: ICD-10-CM

## 2023-08-09 DIAGNOSIS — R11.0 NAUSEA: ICD-10-CM

## 2023-08-09 DIAGNOSIS — G35 MULTIPLE SCLEROSIS EXACERBATION (HCC): Primary | ICD-10-CM

## 2023-08-09 DIAGNOSIS — G35 MULTIPLE SCLEROSIS, RELAPSING-REMITTING (HCC): ICD-10-CM

## 2023-08-09 LAB
ALBUMIN SERPL-MCNC: 4.5 G/DL (ref 3.5–5.2)
ALBUMIN/GLOB SERPL: 1.8 {RATIO} (ref 1–2.5)
ALP SERPL-CCNC: 107 U/L (ref 35–104)
ALT SERPL-CCNC: 14 U/L (ref 5–33)
ANION GAP SERPL CALCULATED.3IONS-SCNC: 11 MMOL/L (ref 9–17)
AST SERPL-CCNC: 16 U/L
BASOPHILS # BLD: 0 K/UL (ref 0–0.2)
BASOPHILS NFR BLD: 0 % (ref 0–2)
BILIRUB SERPL-MCNC: 0.4 MG/DL (ref 0.3–1.2)
BUN SERPL-MCNC: 7 MG/DL (ref 6–20)
CALCIUM SERPL-MCNC: 9.3 MG/DL (ref 8.6–10.4)
CHLORIDE SERPL-SCNC: 104 MMOL/L (ref 98–107)
CO2 SERPL-SCNC: 23 MMOL/L (ref 20–31)
CREAT SERPL-MCNC: 0.8 MG/DL (ref 0.5–0.9)
EOSINOPHIL # BLD: 0.2 K/UL (ref 0–0.4)
EOSINOPHILS RELATIVE PERCENT: 2 % (ref 1–4)
ERYTHROCYTE [DISTWIDTH] IN BLOOD BY AUTOMATED COUNT: 15.4 % (ref 12.5–15.4)
GFR SERPL CREATININE-BSD FRML MDRD: >60 ML/MIN/1.73M2
GLUCOSE SERPL-MCNC: 84 MG/DL (ref 70–99)
HCG SERPL QL: NEGATIVE
HCT VFR BLD AUTO: 37.2 % (ref 36–46)
HGB BLD-MCNC: 12.5 G/DL (ref 12–16)
LYMPHOCYTES NFR BLD: 1.1 K/UL (ref 1–4.8)
LYMPHOCYTES RELATIVE PERCENT: 17 % (ref 24–44)
MCH RBC QN AUTO: 27.8 PG (ref 26–34)
MCHC RBC AUTO-ENTMCNC: 33.7 G/DL (ref 31–37)
MCV RBC AUTO: 82.5 FL (ref 80–100)
MONOCYTES NFR BLD: 0.4 K/UL (ref 0.1–1.2)
MONOCYTES NFR BLD: 7 % (ref 2–11)
NEUTROPHILS NFR BLD: 74 % (ref 36–66)
NEUTS SEG NFR BLD: 4.8 K/UL (ref 1.8–7.7)
PLATELET # BLD AUTO: 280 K/UL (ref 140–450)
PMV BLD AUTO: 8.3 FL (ref 6–12)
POTASSIUM SERPL-SCNC: 3.8 MMOL/L (ref 3.7–5.3)
PROT SERPL-MCNC: 7 G/DL (ref 6.4–8.3)
RBC # BLD AUTO: 4.51 M/UL (ref 4–5.2)
SODIUM SERPL-SCNC: 138 MMOL/L (ref 135–144)
TROPONIN I SERPL HS-MCNC: <6 NG/L (ref 0–14)
WBC OTHER # BLD: 6.6 K/UL (ref 3.5–11)

## 2023-08-09 PROCEDURE — G8427 DOCREV CUR MEDS BY ELIG CLIN: HCPCS | Performed by: PSYCHIATRY & NEUROLOGY

## 2023-08-09 PROCEDURE — 84484 ASSAY OF TROPONIN QUANT: CPT

## 2023-08-09 PROCEDURE — 96375 TX/PRO/DX INJ NEW DRUG ADDON: CPT | Performed by: EMERGENCY MEDICINE

## 2023-08-09 PROCEDURE — 6360000002 HC RX W HCPCS: Performed by: PHYSICIAN ASSISTANT

## 2023-08-09 PROCEDURE — 99285 EMERGENCY DEPT VISIT HI MDM: CPT | Performed by: EMERGENCY MEDICINE

## 2023-08-09 PROCEDURE — 1036F TOBACCO NON-USER: CPT | Performed by: PSYCHIATRY & NEUROLOGY

## 2023-08-09 PROCEDURE — 85025 COMPLETE CBC W/AUTO DIFF WBC: CPT

## 2023-08-09 PROCEDURE — 71045 X-RAY EXAM CHEST 1 VIEW: CPT

## 2023-08-09 PROCEDURE — G8417 CALC BMI ABV UP PARAM F/U: HCPCS | Performed by: PSYCHIATRY & NEUROLOGY

## 2023-08-09 PROCEDURE — 99214 OFFICE O/P EST MOD 30 MIN: CPT | Performed by: PSYCHIATRY & NEUROLOGY

## 2023-08-09 PROCEDURE — 84703 CHORIONIC GONADOTROPIN ASSAY: CPT

## 2023-08-09 PROCEDURE — 70450 CT HEAD/BRAIN W/O DYE: CPT

## 2023-08-09 PROCEDURE — 36415 COLL VENOUS BLD VENIPUNCTURE: CPT

## 2023-08-09 PROCEDURE — 2580000003 HC RX 258: Performed by: PHYSICIAN ASSISTANT

## 2023-08-09 PROCEDURE — 80053 COMPREHEN METABOLIC PANEL: CPT

## 2023-08-09 PROCEDURE — 96374 THER/PROPH/DIAG INJ IV PUSH: CPT | Performed by: EMERGENCY MEDICINE

## 2023-08-09 PROCEDURE — 93005 ELECTROCARDIOGRAM TRACING: CPT | Performed by: PHYSICIAN ASSISTANT

## 2023-08-09 RX ORDER — KETOROLAC TROMETHAMINE 30 MG/ML
30 INJECTION, SOLUTION INTRAMUSCULAR; INTRAVENOUS ONCE
Status: COMPLETED | OUTPATIENT
Start: 2023-08-09 | End: 2023-08-09

## 2023-08-09 RX ORDER — KETOROLAC TROMETHAMINE 30 MG/ML
30 INJECTION, SOLUTION INTRAMUSCULAR; INTRAVENOUS ONCE
Status: DISCONTINUED | OUTPATIENT
Start: 2023-08-09 | End: 2023-08-09

## 2023-08-09 RX ORDER — DEXAMETHASONE SODIUM PHOSPHATE 10 MG/ML
8 INJECTION, SOLUTION INTRAMUSCULAR; INTRAVENOUS ONCE
Status: COMPLETED | OUTPATIENT
Start: 2023-08-09 | End: 2023-08-09

## 2023-08-09 RX ORDER — PROCHLORPERAZINE EDISYLATE 5 MG/ML
10 INJECTION INTRAMUSCULAR; INTRAVENOUS ONCE
Status: COMPLETED | OUTPATIENT
Start: 2023-08-09 | End: 2023-08-09

## 2023-08-09 RX ORDER — 0.9 % SODIUM CHLORIDE 0.9 %
500 INTRAVENOUS SOLUTION INTRAVENOUS ONCE
Status: COMPLETED | OUTPATIENT
Start: 2023-08-09 | End: 2023-08-09

## 2023-08-09 RX ORDER — MECLIZINE HYDROCHLORIDE 25 MG/1
25 TABLET ORAL 3 TIMES DAILY PRN
Qty: 20 TABLET | Refills: 0 | Status: SHIPPED | OUTPATIENT
Start: 2023-08-09 | End: 2023-08-19

## 2023-08-09 RX ORDER — DIPHENHYDRAMINE HYDROCHLORIDE 50 MG/ML
25 INJECTION INTRAMUSCULAR; INTRAVENOUS ONCE
Status: COMPLETED | OUTPATIENT
Start: 2023-08-09 | End: 2023-08-09

## 2023-08-09 RX ORDER — PREDNISONE 10 MG/1
TABLET ORAL
Qty: 20 TABLET | Refills: 0 | Status: SHIPPED | OUTPATIENT
Start: 2023-08-09

## 2023-08-09 RX ADMIN — KETOROLAC TROMETHAMINE 30 MG: 30 INJECTION, SOLUTION INTRAMUSCULAR; INTRAVENOUS at 18:27

## 2023-08-09 RX ADMIN — DIPHENHYDRAMINE HYDROCHLORIDE 25 MG: 50 INJECTION, SOLUTION INTRAMUSCULAR; INTRAVENOUS at 18:29

## 2023-08-09 RX ADMIN — DEXAMETHASONE SODIUM PHOSPHATE 8 MG: 10 INJECTION, SOLUTION INTRAMUSCULAR; INTRAVENOUS at 18:26

## 2023-08-09 RX ADMIN — PROCHLORPERAZINE EDISYLATE 10 MG: 5 INJECTION, SOLUTION INTRAMUSCULAR; INTRAVENOUS at 18:30

## 2023-08-09 RX ADMIN — SODIUM CHLORIDE 500 ML: 9 INJECTION, SOLUTION INTRAVENOUS at 17:32

## 2023-08-09 ASSESSMENT — ENCOUNTER SYMPTOMS
EYES NEGATIVE: 1
RESPIRATORY NEGATIVE: 1
NAUSEA: 1

## 2023-08-09 ASSESSMENT — PAIN SCALES - GENERAL: PAINLEVEL_OUTOF10: 4

## 2023-08-09 ASSESSMENT — PAIN - FUNCTIONAL ASSESSMENT: PAIN_FUNCTIONAL_ASSESSMENT: 0-10

## 2023-08-09 NOTE — PROGRESS NOTES
NEUROLOGY FOLLOW-UP VISIT   Patient Name:       Sanya Dominguez  :        1980  Clinic Visit Date:    2023        Dear Dr. Akua Gaxiola, APRN - CNP     I saw  Ms. Sanya Dominguez in follow-up visit today in continuation of neurologic care. As you know she  is a 39 y.o.  female with relapsing remitting multiple sclerosis on DMT with IV Ocrevus infusion therapy since 2022. Today she is seen in clinic on unscheduled visit. She comes to clinic stating that she has had worsening dizziness with intractable headaches and \"pins-and-needles sensations involving different parts of body\". Her  also noted her having gait difficulties described as \"walking like she is drunk\". Headache is extremely severe and holocephalic with associated photophobia and nausea. Also has lightheadedness with intermittent vertigo and nausea. Pain meds for headaches are not helpful. Also has blurred vision. She has had IV eculizumab infusion on Wednesday, 2023. Patient stated that her infusion was given at faster rate. It was different from her prior infusions. As per ; patient's fatigue worsened; mental status has changed since then., also added \"more problems since after infusion\". MULTIPLE SCLEROSIS DISEASE SUMMARY  Date of onset:  (R sensory loss and dizziness)  Date of diagnosis of MS:   Disease course at onset: Relapsing-Remitting  Current disease course: Relapsing-Remitting  Previous disease therapies: IVMP, Betaseron ( 1968-9830, pregnancy), Avonex (, took for 3-4 mos, flu-like symptoms), copaxone, mayzent (2020-2022, lymphopenia)   Current disease therapy: Ocrevus (2022-present)  CSF: N/A  JCV serology result and date: N/A  Vitamin D: 77.2 (2022)  9HPT: 21.18 sec  T25W: 6.59 sec       All items selected indicate a positive finding. Those items not selected are negative.   Constitutional [] Weight loss/gain   [x] Fatigue  [] Fever/Chills   HEENT [] Hearing

## 2023-08-09 NOTE — ED PROVIDER NOTES
[facility] Emergency Department  [address] [address] [facility] [facility] [address]  Phone: [phone]  Fax: [phone]        Pt Name: Lico Shabazz  MRN: [id number]  [address] [place] 1980  Date of evaluation: 23    [address]       Chief Complaint   Patient presents with    Dizziness     Reports dizziness, headaches, and tingling feeling over entire body x1 week. Reports hx of MS and states her neurologist told her to come to ED for evaluation. HISTORY OF PRESENT ILLNESS (Location/Symptom, Timing/Onset, Context/Setting, Quality, Duration, Modifying Factors, Severity)      Lico Shabazz is a 39 y.o. female with no pertinent PMH who presents to the ED via private auto with complaints of dizziness, headaches and chest pain. Dizziness and headaches have occurred over the last 2 weeks with worsening over the last several days. She has tried some ibuprofen with little relief. She has history of multiple sclerosis. She has recently been to her neurologist Dr Chance Ling this morning with these symptoms and complaints. He recommended using the emergency center. She has recently received IV infusion of Ocrevus. She has had reactions from treatment in the past that included ocular disturbances. PAST MEDICAL / SURGICAL / SOCIAL / FAMILY HISTORY     PMH:  has a past medical history of Acute relapsing multiple sclerosis ([address]), Lumbar herniated disc, and MS (multiple sclerosis) ([address]). Surgical History:  has a past surgical history that includes  section and knee surgery (Left). Social History:  reports that she quit smoking about 13 years ago. Her smoking use included cigarettes. She has a 7.50 pack-year smoking history. She has never used smokeless tobacco. She reports that she does not currently use alcohol. She reports that she does not use drugs. Family History: is adopted. She was adopted. Family history is unknown by patient.   Psychiatric History:

## 2023-08-10 ENCOUNTER — TELEPHONE (OUTPATIENT)
Dept: NEUROLOGY | Age: 43
End: 2023-08-10

## 2023-08-10 NOTE — DISCHARGE INSTRUCTIONS
Instruction to continue home medications as prescribed. Try to stay hydrated with fluids. Please keep follow-up with neurology. Please follow-up with primary care in the next 2 days. Return to the emergency center with any new or worsening symptoms. [address]!!!    From [facility] ([facility]) and [address] [place] [address] Emergency Services    On behalf of the Emergency Department staff at [facility]), I would like to thank you for giving us the opportunity to address your health care needs and concerns. We hope that during your visit, our service was delivered in a professional and caring manner. Please keep [facility] ([facility]) in mind as we walk with you down the path to your own personal wellness. Please expect an automated text message or email from us so we can ask a few questions about your health and progress. Based on your answers, a clinician may call you back to offer help and instructions. Please understand that early in the process of an illness or injury, an emergency department workup can be falsely reassuring. If you notice any worsening, changing or persistent symptoms please call your family doctor or return to the ER immediately. Tell us how we did during your visit at [email or web address]   and let us know about your experience

## 2023-08-11 ENCOUNTER — TELEPHONE (OUTPATIENT)
Dept: NEUROLOGY | Age: 43
End: 2023-08-11

## 2023-08-11 LAB
EKG ATRIAL RATE: 58 BPM
EKG P AXIS: 22 DEGREES
EKG P-R INTERVAL: 146 MS
EKG Q-T INTERVAL: 422 MS
EKG QRS DURATION: 96 MS
EKG QTC CALCULATION (BAZETT): 414 MS
EKG R AXIS: -2 DEGREES
EKG T AXIS: 11 DEGREES
EKG VENTRICULAR RATE: 58 BPM

## 2023-08-11 NOTE — TELEPHONE ENCOUNTER
Patient called into the office requesting a note work as she would like to go back, however she is concerned about her symptoms and she is worried about increasing dizziness. She was wondering if there was any way to get a note to go back to work, but with restrictions. Please advise.

## 2023-08-11 NOTE — TELEPHONE ENCOUNTER
Please give a follow-up appointment next week.,  As a virtual appointment to go over MRI brain results and to answer her questions regarding work.     Please let the patient know that  Thank you.  -Dr. Diana Arora

## 2023-08-17 ENCOUNTER — TELEMEDICINE (OUTPATIENT)
Dept: NEUROLOGY | Age: 43
End: 2023-08-17
Payer: COMMERCIAL

## 2023-08-17 DIAGNOSIS — R42 VERTIGO: ICD-10-CM

## 2023-08-17 DIAGNOSIS — R42 PERSISTENT POSTURAL-PERCEPTUAL DIZZINESS: ICD-10-CM

## 2023-08-17 DIAGNOSIS — R42 DIZZINESS: ICD-10-CM

## 2023-08-17 DIAGNOSIS — R51.9 ACUTE INTRACTABLE HEADACHE, UNSPECIFIED HEADACHE TYPE: ICD-10-CM

## 2023-08-17 DIAGNOSIS — G35 MULTIPLE SCLEROSIS, RELAPSING-REMITTING (HCC): Primary | ICD-10-CM

## 2023-08-17 DIAGNOSIS — R53.83 OTHER FATIGUE: ICD-10-CM

## 2023-08-17 PROCEDURE — G8427 DOCREV CUR MEDS BY ELIG CLIN: HCPCS | Performed by: PSYCHIATRY & NEUROLOGY

## 2023-08-17 PROCEDURE — 99214 OFFICE O/P EST MOD 30 MIN: CPT | Performed by: PSYCHIATRY & NEUROLOGY

## 2023-08-17 RX ORDER — MECLIZINE HYDROCHLORIDE 25 MG/1
25 TABLET ORAL 3 TIMES DAILY PRN
Qty: 20 TABLET | Refills: 0 | Status: SHIPPED | OUTPATIENT
Start: 2023-08-17 | End: 2023-08-27

## 2023-08-17 NOTE — PROGRESS NOTES
NEUROLOGY FOLLOW-UP VISIT   Patient Name:       Mackenzie Duarte  :        1980  Clinic Visit Date:    2023        Dear Dr. Miguel Angel Larsen, APRN - CNP     I saw  Ms. Mackenzie Duarte in virtual visit follow-up visit today in continuation of neurologic care. As you know she  is a 39 y.o.  female with relapsing remitting multiple sclerosis on DMT with IV Ocrevus infusion therapy since 2022. Today she is seen in the clinic on emergent basis for her symptoms of dizziness and intractable headaches. Her dizziness was vertiginous getting exacerbated by any movement of the head and also feeling nauseated. She also has been having headaches associated with photophobia and dizziness. Has not had any relief with NSAIDs. During last visit on 2023; patient was sent to ER because of above-mentioned intractable symptoms along with chest tightness. Patient was seen in ER; CT head done was negative. EKG and troponin were unremarkable. Patient has received Toradol, Compazine and Benadryl and Decadron. Also received IV fluids with some improvement in headaches. As her testing was unremarkable and also as headaches were improving; patient was discharged to home to be followed up in clinic. Today she is seen on video visit as her symptoms did not improve. Any movement of the head is making her extremely dizzy along with room spinning sensation and nausea. Denied vomiting. She does not feel comfortable moving and she is very much afraid of going back to work without any improvement in dizziness. She also has not had MRI brain as requested. She has had IV ocrelizumab infusion on Wednesday, 2023. Patient stated that her infusion was given at faster rate. It was different from her prior infusions. As per ; patient's fatigue worsened; mental status has changed since then., also added \"more problems since after infusion\".      MULTIPLE SCLEROSIS DISEASE SUMMARY  Date of onset: 0 (R

## 2023-08-18 ENCOUNTER — TELEPHONE (OUTPATIENT)
Dept: NEUROLOGY | Age: 43
End: 2023-08-18

## 2023-08-18 NOTE — TELEPHONE ENCOUNTER
I called the patient to do the virtual check out and made the next appointment and I mailed her a copy of the AVS, the work note that Dr. Carlos Manuel Fitzgerald generated, and the order for vestibular rehab.  Royce Naidu

## 2023-08-28 ENCOUNTER — HOSPITAL ENCOUNTER (OUTPATIENT)
Dept: MRI IMAGING | Age: 43
Discharge: HOME | End: 2023-08-30
Attending: PSYCHIATRY & NEUROLOGY
Payer: COMMERCIAL

## 2023-08-28 DIAGNOSIS — G35 MULTIPLE SCLEROSIS EXACERBATION (HCC): ICD-10-CM

## 2023-08-28 PROCEDURE — 70553 MRI BRAIN STEM W/O & W/DYE: CPT

## 2023-08-28 PROCEDURE — 6360000004 HC RX CONTRAST MEDICATION: Performed by: PSYCHIATRY & NEUROLOGY

## 2023-08-28 PROCEDURE — A9579 GAD-BASE MR CONTRAST NOS,1ML: HCPCS | Performed by: PSYCHIATRY & NEUROLOGY

## 2023-08-28 RX ADMIN — GADOTERIDOL 20 ML: 279.3 INJECTION, SOLUTION INTRAVENOUS at 09:45

## 2023-09-12 ENCOUNTER — TELEMEDICINE (OUTPATIENT)
Dept: NEUROLOGY | Age: 43
End: 2023-09-12
Payer: COMMERCIAL

## 2023-09-12 ENCOUNTER — TELEPHONE (OUTPATIENT)
Dept: NEUROLOGY | Age: 43
End: 2023-09-12

## 2023-09-12 DIAGNOSIS — M62.838 MUSCLE SPASM: ICD-10-CM

## 2023-09-12 DIAGNOSIS — G44.209 ACUTE NON INTRACTABLE TENSION-TYPE HEADACHE: ICD-10-CM

## 2023-09-12 DIAGNOSIS — R51.9 ACUTE INTRACTABLE HEADACHE, UNSPECIFIED HEADACHE TYPE: ICD-10-CM

## 2023-09-12 DIAGNOSIS — G35 MULTIPLE SCLEROSIS, RELAPSING-REMITTING (HCC): Primary | ICD-10-CM

## 2023-09-12 DIAGNOSIS — R20.2 PINS AND NEEDLES SENSATION: ICD-10-CM

## 2023-09-12 DIAGNOSIS — G44.211 INTRACTABLE EPISODIC TENSION-TYPE HEADACHE: ICD-10-CM

## 2023-09-12 DIAGNOSIS — R53.83 OTHER FATIGUE: ICD-10-CM

## 2023-09-12 DIAGNOSIS — R42 DIZZINESS: ICD-10-CM

## 2023-09-12 PROCEDURE — 99214 OFFICE O/P EST MOD 30 MIN: CPT | Performed by: PSYCHIATRY & NEUROLOGY

## 2023-09-12 PROCEDURE — G8427 DOCREV CUR MEDS BY ELIG CLIN: HCPCS | Performed by: PSYCHIATRY & NEUROLOGY

## 2023-09-12 RX ORDER — AMANTADINE HYDROCHLORIDE 100 MG/1
CAPSULE, GELATIN COATED ORAL
Qty: 60 CAPSULE | Refills: 6 | Status: SHIPPED | OUTPATIENT
Start: 2023-09-12

## 2023-09-12 RX ORDER — GABAPENTIN 100 MG/1
CAPSULE ORAL
Qty: 60 CAPSULE | Refills: 6 | Status: SHIPPED | OUTPATIENT
Start: 2023-09-12 | End: 2024-09-23

## 2023-09-12 RX ORDER — TIZANIDINE 2 MG/1
TABLET ORAL
Qty: 30 TABLET | Refills: 1 | Status: SHIPPED | OUTPATIENT
Start: 2023-09-12

## 2023-09-12 NOTE — PROGRESS NOTES
NEUROLOGY FOLLOW-UP VISIT   Patient Name:       Cheryle Lucia  :        1980  Clinic Visit Date:    2023  LOV: 23        Dear Dr. Tesha Blanchard, APRN - CNP     I saw  Ms. Cheryle Lucia in virtual visit follow-up visit today in continuation of neurologic care. As you know she  is a 39 y.o.  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 She was seen on an emergent basis on 2023 for intractable dizziness and headache. Today she comes to clinic for follow-up visit to follow-up on MRI brain study done since then. She comes to clinic stating that her dizziness and vertigo improved. Headaches are mild to moderate in severity and mostly tolerable. She has been taking ibuprofen on as needed basis in addition to gabapentin every evening. She wants to go back to work and requesting letter stating [employer] Click to go back to work\". She also stated that she has occasional dizziness turning her head while driving. Never lost consciousness. Denied nausea associated with it. She is requesting for refills on amantadine. Last visit on 23:  seen in the clinic on emergent basis for her symptoms of dizziness and intractable headaches. Her dizziness was vertiginous getting exacerbated by any movement of the head and also feeling nauseated. She also has been having headaches associated with photophobia and dizziness. Has not had any relief with NSAIDs. During last visit on 2023; patient was sent to ER because of above-mentioned intractable symptoms along with chest tightness. Patient was seen in ER; CT head done was negative. EKG and troponin were unremarkable. Patient has received Toradol, Compazine and Benadryl and Decadron. Also received IV fluids with some improvement in headaches. As her testing was unremarkable and also as headaches were improving; patient was discharged to home to be followed up in clinic. Today she is seen on video visit as her symptoms did not improve.   Any movement

## 2023-09-12 NOTE — TELEPHONE ENCOUNTER
Patient requested to go back to work at her [employer] today and Dr. Alfredito Patino was agreeable with this. Letter generated and signed by Dr. Alfredito Patino. Call placed to Clay Carrillo and this information was given. Clay Carrillo stated that she would check the fax number for her employer and call back with this information.

## 2023-09-12 NOTE — TELEPHONE ENCOUNTER
Patient is asking for a RTW letter. Once the letter is complete writer will forward it via [address] mail.

## 2023-09-12 NOTE — TELEPHONE ENCOUNTER
Kye Gone called and said she has been off of work for 2 months due ot MS issues. She has not been able to pay her electric bill and she said she requested that [facility] fax us a medical exemption form. She would really appreciate if Dr. Emerick Fothergill would sign it because of her MS symptoms. This will prevent them from turning off her electricity, at least at this time. I explained he is not in the office this afternoon but I would send him a messge to see if he would be willing to sign the form when it arrives.

## 2023-09-15 NOTE — TELEPHONE ENCOUNTER
Dr. Irene Ours asked me to call and see if she had had the form sent because we had not received it. She stated that her 's physician did it because he has a CPAP machine.

## 2023-09-22 ENCOUNTER — TELEPHONE (OUTPATIENT)
Dept: NEUROLOGY | Age: 43
End: 2023-09-22

## 2023-09-22 NOTE — TELEPHONE ENCOUNTER
Galilea Silva called in complaining of dizziness/lightheaded and balance issues. She went back to work Tuesday and had to call off today. She stated that when she walks it feels like the room is spinning and she has to hold on to things when walking. She said that she had a fall but it wasn't bad her knees gave out. She said that she did try to eat but it didn't help her. She would like a work note for today.  She said that she will go ER if her symptoms get worst. Can you please advise

## 2023-09-27 NOTE — TELEPHONE ENCOUNTER
She just had MRI brain with contrast and it is not showing any abnormalities. She should go to ER to be seen.     Please let the patient know that   Thank you.   -dr. Nancie Felty

## 2023-09-27 NOTE — TELEPHONE ENCOUNTER
Call placed to patient she stated that she did not go to the ER. She said that she is having a lot of pain in her left hand and right elbow. I did recommend patient go to the ER.

## 2023-10-05 ENCOUNTER — OFFICE VISIT (OUTPATIENT)
Dept: PRIMARY CARE CLINIC | Age: 43
End: 2023-10-05
Payer: COMMERCIAL

## 2023-10-05 VITALS
SYSTOLIC BLOOD PRESSURE: 132 MMHG | WEIGHT: 220 LBS | TEMPERATURE: 97.9 F | OXYGEN SATURATION: 99 % | BODY MASS INDEX: 34.46 KG/M2 | RESPIRATION RATE: 16 BRPM | DIASTOLIC BLOOD PRESSURE: 86 MMHG | HEART RATE: 97 BPM

## 2023-10-05 DIAGNOSIS — M25.541 ARTHRALGIA OF HANDS, BILATERAL: ICD-10-CM

## 2023-10-05 DIAGNOSIS — M25.521 RIGHT ELBOW PAIN: ICD-10-CM

## 2023-10-05 DIAGNOSIS — M25.542 ARTHRALGIA OF HANDS, BILATERAL: ICD-10-CM

## 2023-10-05 DIAGNOSIS — G35 MS (MULTIPLE SCLEROSIS) (HCC): Primary | ICD-10-CM

## 2023-10-05 PROCEDURE — G8417 CALC BMI ABV UP PARAM F/U: HCPCS | Performed by: NURSE PRACTITIONER

## 2023-10-05 PROCEDURE — G8484 FLU IMMUNIZE NO ADMIN: HCPCS | Performed by: NURSE PRACTITIONER

## 2023-10-05 PROCEDURE — G8427 DOCREV CUR MEDS BY ELIG CLIN: HCPCS | Performed by: NURSE PRACTITIONER

## 2023-10-05 PROCEDURE — 96372 THER/PROPH/DIAG INJ SC/IM: CPT | Performed by: NURSE PRACTITIONER

## 2023-10-05 PROCEDURE — 1036F TOBACCO NON-USER: CPT | Performed by: NURSE PRACTITIONER

## 2023-10-05 PROCEDURE — 99214 OFFICE O/P EST MOD 30 MIN: CPT | Performed by: NURSE PRACTITIONER

## 2023-10-05 RX ORDER — METHYLPREDNISOLONE SODIUM SUCCINATE 125 MG/2ML
60 INJECTION, POWDER, LYOPHILIZED, FOR SOLUTION INTRAMUSCULAR; INTRAVENOUS ONCE
Status: COMPLETED | OUTPATIENT
Start: 2023-10-05 | End: 2023-10-05

## 2023-10-05 RX ORDER — METHYLPREDNISOLONE SODIUM SUCCINATE 40 MG/ML
60 INJECTION, POWDER, LYOPHILIZED, FOR SOLUTION INTRAMUSCULAR; INTRAVENOUS ONCE
Status: DISCONTINUED | OUTPATIENT
Start: 2023-10-05 | End: 2023-10-05

## 2023-10-05 RX ADMIN — METHYLPREDNISOLONE SODIUM SUCCINATE 60 MG: 125 INJECTION, POWDER, LYOPHILIZED, FOR SOLUTION INTRAMUSCULAR; INTRAVENOUS at 14:43

## 2023-10-05 ASSESSMENT — PATIENT HEALTH QUESTIONNAIRE - PHQ9
SUM OF ALL RESPONSES TO PHQ9 QUESTIONS 1 & 2: 0
SUM OF ALL RESPONSES TO PHQ QUESTIONS 1-9: 0
2. FEELING DOWN, DEPRESSED OR HOPELESS: 0
SUM OF ALL RESPONSES TO PHQ QUESTIONS 1-9: 0
SUM OF ALL RESPONSES TO PHQ QUESTIONS 1-9: 0
1. LITTLE INTEREST OR PLEASURE IN DOING THINGS: 0
SUM OF ALL RESPONSES TO PHQ QUESTIONS 1-9: 0

## 2023-10-05 ASSESSMENT — ENCOUNTER SYMPTOMS
RESPIRATORY NEGATIVE: 1
EYES NEGATIVE: 1
GASTROINTESTINAL NEGATIVE: 1
ALLERGIC/IMMUNOLOGIC NEGATIVE: 1

## 2023-10-05 NOTE — PROGRESS NOTES
[facility]  Florence Parisi, [address] PRIMARY CARE  [address] [facility] [address] [facility]  Dept: [phone]  Dept Fax: [phone]      Name: Pete Norwood  : 1980         Chief Complaint:     Chief Complaint   Patient presents with    Follow-up     Patient went to ED for left thumb pain and right elbow pain. Patient states the pain has been going on for months. History of Present Illness:      Pete Norwood is a 39 y.o.  female who presents with Follow-up (Patient went to ED for left thumb pain and right elbow pain. Patient states the pain has been going on for months. )      GRACIELA España is here today for an ED follow up. She is having increased pain from her MS. She has been off of work due to the increased pain and vertigo from  [facility] for 3 months. Due to not working she has not been able to afford her medications. She was prescribed steroids and gabapentin but has not been able to get them. She has been following up with Neurology. ER physician believed her increased symptoms were due to not being able to get her infusion. She has been getting Ocrevus infusions. The infusion was given faster than her previous infusions in July and she feels like she didn't tolerate this. Her Neurologist was in agreement. Neurologist and ED doctor prescribed her medications with oral steroids but unable to afford. She has been trying to get financial assistance and disability but has not been able to get any assistance at this time. She is having increased pain in the right elbow that travels up into her shoulder. She will have pain in the left palm into the wrist.  She was prescribed OT for her vertigo but is requesting PT for her hand and elbow pain. She received a Toradol injection in ER that she states didn't help. She has received Solumedrol infusions in the past that did help.     Past Medical History:     Past Medical History:   Diagnosis Date    Acute relapsing

## 2023-10-05 NOTE — PROGRESS NOTES
After obtaining consent, and per orders of Katie Paredes, injection of SoluMedrol given in Right upper quad. gluteus by Remy Combs CMA. Patient instructed to remain in clinic for 20 minutes afterwards, and to report any adverse reaction to me immediately.

## 2023-10-05 NOTE — PATIENT INSTRUCTIONS
SURVEY:     You may be receiving a survey from [employer] regarding your visit today. Please complete the survey to enable us to provide the highest quality of care to you and your family. If you cannot score us a very good on any question, please call the office to discuss how we could have made your experience a very good one.      Thank you,    Claus Patel, APRN-SPENCER Russell, APRN-CNP  Elif Solis, LPN  Parvez Riggs, CMA  Vashti Bueno, CMA  Madison, CMA  Blessing, PCA  Sharonda, PM

## 2023-10-06 ENCOUNTER — CLINICAL DOCUMENTATION (OUTPATIENT)
Facility: HOSPITAL | Age: 43
End: 2023-10-06

## 2023-10-06 NOTE — PROGRESS NOTES
Called patient to see if she would like to be enrolled in the [address] Program that will help with the cost of Ocrevus. Left message for a return call.

## 2023-10-09 ENCOUNTER — CLINICAL DOCUMENTATION (OUTPATIENT)
Facility: HOSPITAL | Age: 43
End: 2023-10-09

## 2023-10-09 NOTE — PROGRESS NOTES
Called patient and she is getting infusion treatments at home with a nurse according to her insurance requirements.

## 2023-11-22 ENCOUNTER — TELEPHONE (OUTPATIENT)
Dept: NEUROLOGY | Age: 43
End: 2023-11-22

## 2023-11-22 NOTE — TELEPHONE ENCOUNTER
11 22 2023 called patient times 2 (11 18 2023 and 11 22 2023 at  [phone]) to schedule March of 2024 follow up appointment with Dr. Apollo Raman, the number was not in service both times, no response. I mailed the patient a letter asking them to call the office back to schedule this appointment.   KS

## 2024-01-22 ENCOUNTER — TELEPHONE (OUTPATIENT)
Dept: NEUROLOGY | Age: 44
End: 2024-01-22

## 2024-01-22 NOTE — TELEPHONE ENCOUNTER
Simona from [facility] stopped in. they have been trying to contact pt. regarding scheduling her next Ocrevus inf.  They said that her previous insurance termed but they did get notice she has [place] now. I told them that I would try to call pt. as she has not scheduled her next follow up yet with us.   I called and lm asking for her to return my call.

## 2024-01-30 NOTE — TELEPHONE ENCOUNTER
Leatha called back.  She does have a new phone number. I noted that in her chart. She wants to proceed with the Ocrevus as planned.    I spoke with Simona at [facility]. She asked me to pull up her Cover My Meds PA and submit it. It is complete but has to be submitted by the doctors office.  I submitted it. I asked if they needed a new order or anything and she stated nothing else was needed.

## 2024-03-06 ASSESSMENT — PATIENT HEALTH QUESTIONNAIRE - PHQ9
SUM OF ALL RESPONSES TO PHQ QUESTIONS 1-9: 0
SUM OF ALL RESPONSES TO PHQ QUESTIONS 1-9: 0
SUM OF ALL RESPONSES TO PHQ9 QUESTIONS 1 & 2: 0
SUM OF ALL RESPONSES TO PHQ QUESTIONS 1-9: 0
1. LITTLE INTEREST OR PLEASURE IN DOING THINGS: 0
2. FEELING DOWN, DEPRESSED OR HOPELESS: NOT AT ALL
SUM OF ALL RESPONSES TO PHQ9 QUESTIONS 1 & 2: 0
2. FEELING DOWN, DEPRESSED OR HOPELESS: 0
SUM OF ALL RESPONSES TO PHQ QUESTIONS 1-9: 0
1. LITTLE INTEREST OR PLEASURE IN DOING THINGS: NOT AT ALL

## 2024-03-07 ENCOUNTER — OFFICE VISIT (OUTPATIENT)
Dept: PRIMARY CARE CLINIC | Age: 44
End: 2024-03-07
Payer: COMMERCIAL

## 2024-03-07 VITALS
BODY MASS INDEX: 38.47 KG/M2 | OXYGEN SATURATION: 98 % | WEIGHT: 245.6 LBS | RESPIRATION RATE: 16 BRPM | DIASTOLIC BLOOD PRESSURE: 82 MMHG | SYSTOLIC BLOOD PRESSURE: 120 MMHG | HEART RATE: 89 BPM | TEMPERATURE: 98.2 F

## 2024-03-07 DIAGNOSIS — G35 MULTIPLE SCLEROSIS, RELAPSING-REMITTING (HCC): ICD-10-CM

## 2024-03-07 DIAGNOSIS — R20.2 PINS AND NEEDLES SENSATION: ICD-10-CM

## 2024-03-07 DIAGNOSIS — R53.83 OTHER FATIGUE: ICD-10-CM

## 2024-03-07 DIAGNOSIS — Z13.220 LIPID SCREENING: Primary | ICD-10-CM

## 2024-03-07 DIAGNOSIS — R42 DIZZINESS: ICD-10-CM

## 2024-03-07 DIAGNOSIS — R51.9 ACUTE INTRACTABLE HEADACHE, UNSPECIFIED HEADACHE TYPE: ICD-10-CM

## 2024-03-07 DIAGNOSIS — G44.209 ACUTE NON INTRACTABLE TENSION-TYPE HEADACHE: ICD-10-CM

## 2024-03-07 PROCEDURE — G8484 FLU IMMUNIZE NO ADMIN: HCPCS | Performed by: NURSE PRACTITIONER

## 2024-03-07 PROCEDURE — 99213 OFFICE O/P EST LOW 20 MIN: CPT | Performed by: NURSE PRACTITIONER

## 2024-03-07 PROCEDURE — G8417 CALC BMI ABV UP PARAM F/U: HCPCS | Performed by: NURSE PRACTITIONER

## 2024-03-07 PROCEDURE — G8427 DOCREV CUR MEDS BY ELIG CLIN: HCPCS | Performed by: NURSE PRACTITIONER

## 2024-03-07 PROCEDURE — 1036F TOBACCO NON-USER: CPT | Performed by: NURSE PRACTITIONER

## 2024-03-07 RX ORDER — GABAPENTIN 100 MG/1
CAPSULE ORAL
Qty: 180 CAPSULE | Refills: 0 | Status: CANCELLED | OUTPATIENT
Start: 2024-03-07 | End: 2025-03-19

## 2024-03-07 SDOH — ECONOMIC STABILITY: FOOD INSECURITY: WITHIN THE PAST 12 MONTHS, YOU WORRIED THAT YOUR FOOD WOULD RUN OUT BEFORE YOU GOT MONEY TO BUY MORE.: NEVER TRUE

## 2024-03-07 SDOH — ECONOMIC STABILITY: INCOME INSECURITY: HOW HARD IS IT FOR YOU TO PAY FOR THE VERY BASICS LIKE FOOD, HOUSING, MEDICAL CARE, AND HEATING?: NOT HARD AT ALL

## 2024-03-07 SDOH — ECONOMIC STABILITY: FOOD INSECURITY: WITHIN THE PAST 12 MONTHS, THE FOOD YOU BOUGHT JUST DIDN'T LAST AND YOU DIDN'T HAVE MONEY TO GET MORE.: NEVER TRUE

## 2024-03-07 NOTE — PATIENT INSTRUCTIONS
SURVEY:     You may be receiving a survey from [facility] regarding your visit today.     Please complete the survey to enable us to provide the highest quality of care to you and your family.     If you cannot score us a very good on any question, please call the office to discuss how we could have made your experience a very good one.     Thank you,    London Patel, APRN-CNP  Katie Lange, APRN-CNP  Helen, LPN  Alma, CMA  Subhash, CMA  Madison, CMA  Blessing, PCA  Lexie, CMA  Sharonda, PM

## 2024-03-09 ASSESSMENT — ENCOUNTER SYMPTOMS
ALLERGIC/IMMUNOLOGIC NEGATIVE: 1
GASTROINTESTINAL NEGATIVE: 1
RESPIRATORY NEGATIVE: 1
EYES NEGATIVE: 1

## 2024-06-26 ENCOUNTER — TELEMEDICINE (OUTPATIENT)
Dept: NEUROLOGY | Age: 44
End: 2024-06-26
Payer: COMMERCIAL

## 2024-06-26 ENCOUNTER — TELEPHONE (OUTPATIENT)
Dept: NEUROLOGY | Age: 44
End: 2024-06-26

## 2024-06-26 DIAGNOSIS — G44.211 INTRACTABLE EPISODIC TENSION-TYPE HEADACHE: ICD-10-CM

## 2024-06-26 DIAGNOSIS — Z79.899 HIGH RISK MEDICATION USE: ICD-10-CM

## 2024-06-26 DIAGNOSIS — R26.2 DIFFICULTY IN WALKING: ICD-10-CM

## 2024-06-26 DIAGNOSIS — R20.2 PINS AND NEEDLES SENSATION: ICD-10-CM

## 2024-06-26 DIAGNOSIS — R53.83 OTHER FATIGUE: ICD-10-CM

## 2024-06-26 DIAGNOSIS — R51.9 ACUTE INTRACTABLE HEADACHE, UNSPECIFIED HEADACHE TYPE: ICD-10-CM

## 2024-06-26 DIAGNOSIS — G35 MULTIPLE SCLEROSIS, RELAPSING-REMITTING (HCC): Primary | ICD-10-CM

## 2024-06-26 DIAGNOSIS — G44.209 ACUTE NON INTRACTABLE TENSION-TYPE HEADACHE: ICD-10-CM

## 2024-06-26 DIAGNOSIS — R42 DIZZINESS: ICD-10-CM

## 2024-06-26 PROCEDURE — G8427 DOCREV CUR MEDS BY ELIG CLIN: HCPCS | Performed by: PSYCHIATRY & NEUROLOGY

## 2024-06-26 PROCEDURE — 99214 OFFICE O/P EST MOD 30 MIN: CPT | Performed by: PSYCHIATRY & NEUROLOGY

## 2024-06-26 RX ORDER — AMANTADINE HYDROCHLORIDE 100 MG/1
CAPSULE, GELATIN COATED ORAL
Qty: 60 CAPSULE | Refills: 6 | Status: SHIPPED | OUTPATIENT
Start: 2024-06-26

## 2024-06-26 RX ORDER — GABAPENTIN 300 MG/1
CAPSULE ORAL
Qty: 30 CAPSULE | Refills: 6 | Status: SHIPPED | OUTPATIENT
Start: 2024-06-26 | End: 2024-06-26 | Stop reason: CLARIF

## 2024-06-26 RX ORDER — GABAPENTIN 300 MG/1
CAPSULE ORAL
Qty: 30 CAPSULE | Refills: 5 | Status: SHIPPED | OUTPATIENT
Start: 2024-06-26 | End: 2024-12-30

## 2024-06-26 NOTE — TELEPHONE ENCOUNTER
Patient is asking us to call her in couple of days to follow up on ophthalmologist recommendations.   Thank you.   -dr. Raymond Andrade MD 6/26/2024 8:34 AM

## 2024-06-26 NOTE — TELEPHONE ENCOUNTER
Pharmacist Norman from [facility] in [place] [phone]) left a message that the Gabapentin Rx they received has two different directions on it - under note to pharmacy it says \"Take one cap every evening\" and then under the sig it states \"Take two cap every evening\".  Pharmacist is asking for an updated Rx.  Please send corrected Rx to pharmacy.

## 2024-06-26 NOTE — TELEPHONE ENCOUNTER
Please let the pharmacy know that I did send a new prescription  Gabapentin 300 mg nightly    Thank you.   -dr. russo     Never

## 2024-06-26 NOTE — PROGRESS NOTES
NEUROLOGY FOLLOW-UP VISIT   Patient Name:       Leatha Gutierrez  :        1980  Clinic Visit Date:    2024  LOV: 2023        Leatha Gutierrez, was evaluated through a synchronous (real-time) audio-video encounter. The patient (or guardian if applicable) is aware that this is a billable service, which includes applicable co-pays. This Virtual Visit was conducted with patient's (and/or legal guardian's) consent. Patient identification was verified, and a caregiver was present when appropriate.   The patient was located at Home: [address] [place] [id number]  Provider was located at Facility (Appt Dept): [address] [place], [place] [id number]  Confirm you are appropriately licensed, registered, or certified to deliver care in the state where the patient is located as indicated above. If you are not or unsure, please re-schedule the visit: Yes, I confirm.         Dear Dr. Lange, Katie SMITH, APRN - CNP     I saw  Ms. Leatha Gutierrez in virtual visit follow-up visit today in continuation of neurologic care.  As you know she  is a 44 y.o.  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  Prior to last visit; she was having worsening headaches with the dizziness and vertigo; MRI brain with contrast done in 2023 was negative for new MS lesions and no evidence of contrast-enhancement.  She wanted to go back to work on gabapentin/muscle relaxer and amantadine.  She comes to visit today stating she has not had any recent visits to ER for any indication suggestive of MS exacerbation. She stated that she forgot to see her ophthalmologist as recommended during last visit. She had hx of rt optic neuritis with loss of vision for several days in  while on Avonex. Since that time, she has been following up with ophthalmologist. She has some difficulties with ambulation and she hasn't been to PT recently. She also has not had any blood work recently. She is due for Ocrevus infusion.       2023

## 2024-07-09 ENCOUNTER — HOSPITAL ENCOUNTER (OUTPATIENT)
Age: 44
Discharge: HOME | End: 2024-07-09
Payer: COMMERCIAL

## 2024-07-09 DIAGNOSIS — G35 MULTIPLE SCLEROSIS, RELAPSING-REMITTING (HCC): ICD-10-CM

## 2024-07-09 DIAGNOSIS — Z79.899 HIGH RISK MEDICATION USE: ICD-10-CM

## 2024-07-09 LAB
ALBUMIN SERPL-MCNC: 4.5 G/DL (ref 3.5–5.2)
ALBUMIN/GLOB SERPL: 1.8 {RATIO} (ref 1–2.5)
ALP SERPL-CCNC: 131 U/L (ref 35–104)
ALT SERPL-CCNC: 21 U/L (ref 5–33)
ANION GAP SERPL CALCULATED.3IONS-SCNC: 10 MMOL/L (ref 9–17)
AST SERPL-CCNC: 19 U/L
BASOPHILS # BLD: 0.03 K/UL (ref 0–0.2)
BASOPHILS NFR BLD: 1 % (ref 0–2)
BILIRUB DIRECT SERPL-MCNC: <0.1 MG/DL
BILIRUB INDIRECT SERPL-MCNC: ABNORMAL MG/DL (ref 0–1)
BILIRUB SERPL-MCNC: 0.4 MG/DL (ref 0.3–1.2)
BUN SERPL-MCNC: 7 MG/DL (ref 6–20)
BUN/CREAT SERPL: 9 (ref 9–20)
CALCIUM SERPL-MCNC: 9.4 MG/DL (ref 8.6–10.4)
CHLORIDE SERPL-SCNC: 105 MMOL/L (ref 98–107)
CO2 SERPL-SCNC: 25 MMOL/L (ref 20–31)
CREAT SERPL-MCNC: 0.8 MG/DL (ref 0.5–0.9)
EOSINOPHIL # BLD: 0.22 K/UL (ref 0–0.44)
EOSINOPHILS RELATIVE PERCENT: 4 % (ref 1–4)
ERYTHROCYTE [DISTWIDTH] IN BLOOD BY AUTOMATED COUNT: 12.7 % (ref 11.8–14.4)
GFR, ESTIMATED: >90 ML/MIN/1.73M2
GLUCOSE SERPL-MCNC: 75 MG/DL (ref 70–99)
HCT VFR BLD AUTO: 40.2 % (ref 36.3–47.1)
HGB BLD-MCNC: 13.7 G/DL (ref 11.9–15.1)
IMM GRANULOCYTES # BLD AUTO: <0.03 K/UL (ref 0–0.3)
IMM GRANULOCYTES NFR BLD: 0 %
LYMPHOCYTES NFR BLD: 1.38 K/UL (ref 1.1–3.7)
LYMPHOCYTES RELATIVE PERCENT: 22 % (ref 24–43)
MCH RBC QN AUTO: 29 PG (ref 25.2–33.5)
MCHC RBC AUTO-ENTMCNC: 34.1 G/DL (ref 28.4–34.8)
MCV RBC AUTO: 85.2 FL (ref 82.6–102.9)
MONOCYTES NFR BLD: 0.67 K/UL (ref 0.1–1.2)
MONOCYTES NFR BLD: 11 % (ref 3–12)
NEUTROPHILS NFR BLD: 62 % (ref 36–65)
NEUTS SEG NFR BLD: 3.97 K/UL (ref 1.5–8.1)
NRBC BLD-RTO: 0 PER 100 WBC
PLATELET # BLD AUTO: 295 K/UL (ref 138–453)
PMV BLD AUTO: 10.4 FL (ref 8.1–13.5)
POTASSIUM SERPL-SCNC: 4.6 MMOL/L (ref 3.7–5.3)
PROT SERPL-MCNC: 7 G/DL (ref 6.4–8.3)
RBC # BLD AUTO: 4.72 M/UL (ref 3.95–5.11)
SODIUM SERPL-SCNC: 140 MMOL/L (ref 135–144)
WBC OTHER # BLD: 6.3 K/UL (ref 3.5–11.3)

## 2024-07-09 PROCEDURE — 36415 COLL VENOUS BLD VENIPUNCTURE: CPT

## 2024-07-09 PROCEDURE — 80048 BASIC METABOLIC PNL TOTAL CA: CPT

## 2024-07-09 PROCEDURE — 80076 HEPATIC FUNCTION PANEL: CPT

## 2024-07-09 PROCEDURE — 85025 COMPLETE CBC W/AUTO DIFF WBC: CPT

## 2024-07-16 ENCOUNTER — TELEPHONE (OUTPATIENT)
Dept: PRIMARY CARE CLINIC | Age: 44
End: 2024-07-16

## 2024-07-16 DIAGNOSIS — R74.8 ELEVATED ALKALINE PHOSPHATASE LEVEL: Primary | ICD-10-CM

## 2024-07-16 NOTE — TELEPHONE ENCOUNTER
----- Message from POLY Gallagher CNP sent at 7/16/2024 12:32 PM EDT -----  Please call Leatha and inform her that I have ordered a couple labs to get done to evaluate this.   ----- Message -----  From: Vinicius Andrade MD  Sent: 7/16/2024  10:57 AM EDT  To: POLY Gallagher CNP; #    Please let the patient know that alkaline phosphatase is slightly elevated at 131 (normal is up to 129 as per [facility] labs).  She should talk to her PCP also.  Rest of the liver enzymes are unremarkable.    Thank you.   -dr. andrade

## 2024-07-16 NOTE — TELEPHONE ENCOUNTER
----- Message from Sam Tejeda sent at 7/16/2024 11:34 AM EDT -----  Regarding: [facility] Appointment Request  [facility] Appointment Request    Patient needs appointment for [facility] Appointment Type: Existing Condition Follow Up.T  Patient Requested Dates(s):THURSDAY  Patient Requested Time: 11A.MOR 12 N.N  Provider Name:Katie Lange, POLY - SPENCER    Reason for Appointment Request: Established Patient - Available appointments did not meet patient need  DISCUSS SOME TEST RESULT  --------------------------------------------------------------------------------------------------------------------------    Relationship to Patient: Self     Call Back Information: OK to leave message on voicemail  Preferred Call Back Number: Phone [phone]

## 2024-07-17 NOTE — TELEPHONE ENCOUNTER
Called and let patient know she voiced understanding.    Pt brought via EMS for being drunk. Pt states he has drank 5 liters of vodka in three days.

## 2024-07-22 ENCOUNTER — HOSPITAL ENCOUNTER (OUTPATIENT)
Age: 44
Discharge: HOME | End: 2024-07-22
Payer: COMMERCIAL

## 2024-07-22 DIAGNOSIS — R74.8 ELEVATED ALKALINE PHOSPHATASE LEVEL: ICD-10-CM

## 2024-07-22 LAB
ALBUMIN SERPL-MCNC: 4.4 G/DL (ref 3.5–5.2)
ALBUMIN/GLOB SERPL: 1.6 {RATIO} (ref 1–2.5)
ALP SERPL-CCNC: 134 U/L (ref 35–104)
ALT SERPL-CCNC: 16 U/L (ref 5–33)
AST SERPL-CCNC: 18 U/L
BILIRUB DIRECT SERPL-MCNC: <0.1 MG/DL
BILIRUB INDIRECT SERPL-MCNC: ABNORMAL MG/DL (ref 0–1)
BILIRUB SERPL-MCNC: 0.4 MG/DL (ref 0.3–1.2)
PROT SERPL-MCNC: 7.1 G/DL (ref 6.4–8.3)

## 2024-07-22 PROCEDURE — 36415 COLL VENOUS BLD VENIPUNCTURE: CPT

## 2024-07-22 PROCEDURE — 80076 HEPATIC FUNCTION PANEL: CPT

## 2024-07-22 PROCEDURE — 82977 ASSAY OF GGT: CPT

## 2024-07-23 ENCOUNTER — TELEPHONE (OUTPATIENT)
Dept: PRIMARY CARE CLINIC | Age: 44
End: 2024-07-23

## 2024-07-23 DIAGNOSIS — R53.83 OTHER FATIGUE: ICD-10-CM

## 2024-07-23 DIAGNOSIS — R74.8 ELEVATED SERUM ALKALINE PHOSPHATASE LEVEL: Primary | ICD-10-CM

## 2024-07-23 LAB — GGT SERPL-CCNC: 22 U/L (ref 5–36)

## 2024-07-23 NOTE — TELEPHONE ENCOUNTER
----- Message from POLY Gallagher - CNP sent at 7/23/2024  9:09 AM EDT -----  Please notify patient of  lab results.  Her Alkaline phosphatase is still elevated and her GGT was normal.  I have ordered some additional labs for her to get to further evaluate the cause.  Thanks Katie

## 2024-08-23 ENCOUNTER — HOSPITAL ENCOUNTER (OUTPATIENT)
Age: 44
Discharge: HOME | End: 2024-08-23
Payer: COMMERCIAL

## 2024-08-23 DIAGNOSIS — R74.8 ELEVATED SERUM ALKALINE PHOSPHATASE LEVEL: ICD-10-CM

## 2024-08-23 DIAGNOSIS — R53.83 OTHER FATIGUE: ICD-10-CM

## 2024-08-23 LAB
25(OH)D3 SERPL-MCNC: 19.3 NG/ML (ref 30–100)
CALCIUM SERPL-MCNC: 8.9 MG/DL (ref 8.6–10.4)
PTH-INTACT SERPL-MCNC: 38 PG/ML (ref 15–65)
T3FREE SERPL-MCNC: 3.3 PG/ML (ref 2–4.4)
TSH SERPL DL<=0.05 MIU/L-ACNC: 0.69 UIU/ML (ref 0.3–5)

## 2024-08-23 PROCEDURE — 84443 ASSAY THYROID STIM HORMONE: CPT

## 2024-08-23 PROCEDURE — 83970 ASSAY OF PARATHORMONE: CPT

## 2024-08-23 PROCEDURE — 82306 VITAMIN D 25 HYDROXY: CPT

## 2024-08-23 PROCEDURE — 84481 FREE ASSAY (FT-3): CPT

## 2024-08-23 PROCEDURE — 86376 MICROSOMAL ANTIBODY EACH: CPT

## 2024-08-23 PROCEDURE — 82310 ASSAY OF CALCIUM: CPT

## 2024-08-23 PROCEDURE — 86800 THYROGLOBULIN ANTIBODY: CPT

## 2024-08-26 LAB
THYROGLOBULIN AB: <12 IU/ML (ref 0–40)
THYROPEROXIDASE AB SERPL IA-ACNC: <4 IU/ML (ref 0–25)

## 2024-08-29 ENCOUNTER — TELEPHONE (OUTPATIENT)
Dept: PRIMARY CARE CLINIC | Age: 44
End: 2024-08-29

## 2024-08-29 DIAGNOSIS — E55.9 VITAMIN D DEFICIENCY: Primary | ICD-10-CM

## 2024-08-29 RX ORDER — ERGOCALCIFEROL 1.25 MG/1
50000 CAPSULE, LIQUID FILLED ORAL WEEKLY
Qty: 12 CAPSULE | Refills: 1 | Status: SHIPPED | OUTPATIENT
Start: 2024-08-29

## 2024-08-29 NOTE — TELEPHONE ENCOUNTER
----- Message from POLY Doss - CNP sent at 8/29/2024 11:04 AM EDT -----  Please notify patient that vitamin D is low and I am going to send a weekly supplement to the pharmacy for her to start.  Thanks Katie

## 2024-09-16 ENCOUNTER — HOSPITAL ENCOUNTER (OUTPATIENT)
Age: 44
Discharge: HOME | End: 2024-09-16
Payer: COMMERCIAL

## 2024-09-16 DIAGNOSIS — R53.83 OTHER FATIGUE: ICD-10-CM

## 2024-09-16 DIAGNOSIS — Z13.220 LIPID SCREENING: ICD-10-CM

## 2024-09-16 DIAGNOSIS — G35 MULTIPLE SCLEROSIS, RELAPSING-REMITTING (HCC): ICD-10-CM

## 2024-09-16 DIAGNOSIS — R42 DIZZINESS: ICD-10-CM

## 2024-09-16 LAB
ALBUMIN SERPL-MCNC: 4.2 G/DL (ref 3.5–5.2)
ALBUMIN/GLOB SERPL: 1.9 {RATIO} (ref 1–2.5)
ALP SERPL-CCNC: 124 U/L (ref 35–104)
ALT SERPL-CCNC: 13 U/L (ref 10–35)
ANION GAP SERPL CALCULATED.3IONS-SCNC: 10 MMOL/L (ref 9–16)
AST SERPL-CCNC: 19 U/L (ref 10–35)
BASOPHILS # BLD: 0.03 K/UL (ref 0–0.2)
BASOPHILS NFR BLD: 1 % (ref 0–2)
BILIRUB SERPL-MCNC: 0.3 MG/DL (ref 0–1.2)
BUN SERPL-MCNC: 11 MG/DL (ref 6–20)
BUN/CREAT SERPL: 14 (ref 9–20)
CALCIUM SERPL-MCNC: 9.4 MG/DL (ref 8.6–10.4)
CHLORIDE SERPL-SCNC: 105 MMOL/L (ref 98–107)
CHOLEST SERPL-MCNC: 216 MG/DL (ref 0–199)
CHOLESTEROL/HDL RATIO: 6
CO2 SERPL-SCNC: 25 MMOL/L (ref 20–31)
CREAT SERPL-MCNC: 0.8 MG/DL (ref 0.5–0.9)
EOSINOPHIL # BLD: 0.19 K/UL (ref 0–0.44)
EOSINOPHILS RELATIVE PERCENT: 4 % (ref 1–4)
ERYTHROCYTE [DISTWIDTH] IN BLOOD BY AUTOMATED COUNT: 12.8 % (ref 11.8–14.4)
GFR, ESTIMATED: >90 ML/MIN/1.73M2
GLUCOSE SERPL-MCNC: 78 MG/DL (ref 74–99)
HCT VFR BLD AUTO: 37.8 % (ref 36.3–47.1)
HDLC SERPL-MCNC: 39 MG/DL
HGB BLD-MCNC: 12.6 G/DL (ref 11.9–15.1)
IMM GRANULOCYTES # BLD AUTO: <0.03 K/UL (ref 0–0.3)
IMM GRANULOCYTES NFR BLD: 0 %
LDLC SERPL CALC-MCNC: 122 MG/DL (ref 0–100)
LYMPHOCYTES NFR BLD: 1.43 K/UL (ref 1.1–3.7)
LYMPHOCYTES RELATIVE PERCENT: 28 % (ref 24–43)
MCH RBC QN AUTO: 28.6 PG (ref 25.2–33.5)
MCHC RBC AUTO-ENTMCNC: 33.3 G/DL (ref 28.4–34.8)
MCV RBC AUTO: 85.9 FL (ref 82.6–102.9)
MONOCYTES NFR BLD: 0.52 K/UL (ref 0.1–1.2)
MONOCYTES NFR BLD: 10 % (ref 3–12)
NEUTROPHILS NFR BLD: 57 % (ref 36–65)
NEUTS SEG NFR BLD: 2.98 K/UL (ref 1.5–8.1)
NRBC BLD-RTO: 0 PER 100 WBC
PLATELET # BLD AUTO: 304 K/UL (ref 138–453)
PMV BLD AUTO: 10.6 FL (ref 8.1–13.5)
POTASSIUM SERPL-SCNC: 4 MMOL/L (ref 3.7–5.3)
PROT SERPL-MCNC: 6.5 G/DL (ref 6.6–8.7)
RBC # BLD AUTO: 4.4 M/UL (ref 3.95–5.11)
SODIUM SERPL-SCNC: 140 MMOL/L (ref 136–145)
TRIGL SERPL-MCNC: 275 MG/DL
VLDLC SERPL CALC-MCNC: 55 MG/DL
WBC OTHER # BLD: 5.2 K/UL (ref 3.5–11.3)

## 2024-09-16 PROCEDURE — 36415 COLL VENOUS BLD VENIPUNCTURE: CPT

## 2024-09-16 PROCEDURE — 80061 LIPID PANEL: CPT

## 2024-09-16 PROCEDURE — 85025 COMPLETE CBC W/AUTO DIFF WBC: CPT

## 2024-09-16 PROCEDURE — 80053 COMPREHEN METABOLIC PANEL: CPT

## 2024-09-30 ENCOUNTER — OFFICE VISIT (OUTPATIENT)
Dept: PRIMARY CARE CLINIC | Age: 44
End: 2024-09-30

## 2024-09-30 VITALS
WEIGHT: 240 LBS | HEART RATE: 85 BPM | OXYGEN SATURATION: 98 % | RESPIRATION RATE: 16 BRPM | DIASTOLIC BLOOD PRESSURE: 64 MMHG | SYSTOLIC BLOOD PRESSURE: 122 MMHG | BODY MASS INDEX: 37.67 KG/M2 | TEMPERATURE: 97.9 F | HEIGHT: 67 IN

## 2024-09-30 DIAGNOSIS — Z12.11 COLON CANCER SCREENING: ICD-10-CM

## 2024-09-30 DIAGNOSIS — G35 MS (MULTIPLE SCLEROSIS) (HCC): ICD-10-CM

## 2024-09-30 DIAGNOSIS — R52 GENERALIZED PAIN: ICD-10-CM

## 2024-09-30 DIAGNOSIS — R74.8 ELEVATED ALKALINE PHOSPHATASE LEVEL: ICD-10-CM

## 2024-09-30 ASSESSMENT — PATIENT HEALTH QUESTIONNAIRE - PHQ9
SUM OF ALL RESPONSES TO PHQ QUESTIONS 1-9: 0
2. FEELING DOWN, DEPRESSED OR HOPELESS: NOT AT ALL
1. LITTLE INTEREST OR PLEASURE IN DOING THINGS: NOT AT ALL
SUM OF ALL RESPONSES TO PHQ QUESTIONS 1-9: 0
SUM OF ALL RESPONSES TO PHQ9 QUESTIONS 1 & 2: 0
SUM OF ALL RESPONSES TO PHQ QUESTIONS 1-9: 0
SUM OF ALL RESPONSES TO PHQ QUESTIONS 1-9: 0

## 2024-09-30 NOTE — PATIENT INSTRUCTIONS
SURVEY:     You may be receiving a survey from [employer] regarding your visit today.     Please complete the survey to enable us to provide the highest quality of care to you and your family.     If you cannot score us a very good on any question, please call the office to discuss how we could have made your experience a very good one.     Thank you,    London Patel, APRN-CNP  Katie Lange, APRN-CNP  Helen, LPN  Alma, CMA  Subhash, CMA  Madison, CMA  Blessing, PCA  Lexie, CMA  Sharonda, PM

## 2024-09-30 NOTE — PROGRESS NOTES
Data:     Lab Results   Component Value Date/Time     09/16/2024 04:04 PM    K 4.0 09/16/2024 04:04 PM     09/16/2024 04:04 PM    CO2 25 09/16/2024 04:04 PM    BUN 11 09/16/2024 04:04 PM    CREATININE 0.8 09/16/2024 04:04 PM    GLUCOSE 78 09/16/2024 04:04 PM    BILITOT 0.3 09/16/2024 04:04 PM    ALKPHOS 124 09/16/2024 04:04 PM    AST 19 09/16/2024 04:04 PM    ALT 13 09/16/2024 04:04 PM     Lab Results   Component Value Date/Time    WBC 5.2 09/16/2024 04:04 PM    RBC 4.40 09/16/2024 04:04 PM    HGB 12.6 09/16/2024 04:04 PM    HCT 37.8 09/16/2024 04:04 PM    MCV 85.9 09/16/2024 04:04 PM    MCH 28.6 09/16/2024 04:04 PM    MCHC 33.3 09/16/2024 04:04 PM    RDW 12.8 09/16/2024 04:04 PM     09/16/2024 04:04 PM    MPV 10.6 09/16/2024 04:04 PM     Lab Results   Component Value Date/Time    TSH 0.69 08/23/2024 01:13 PM     Lab Results   Component Value Date/Time    CHOL 216 09/16/2024 04:04 PM     09/16/2024 04:04 PM    HDL 39 09/16/2024 04:04 PM       Assessment/Plan:      Diagnosis Orders   1. Generalized pain  NM BONE SCAN WHOLE BODY      2. Elevated alkaline phosphatase level  NM BONE SCAN WHOLE BODY      3. MS (multiple sclerosis) (HCC)        4. Colon cancer screening  Fecal DNA Colorectal cancer screening (Cologuard)        Discussed labwork and chronic elevated alkaline phosphatase levels.  Bone scan ordered and will call with results and further recommendations.    Recommend following up with Neurologist about potential medication side effects.    Differential Diagnosis:  Rule out Pagets disease vs. MS symptoms.    1.  Leatha received counseling on healthy behaviors and Education discussed during visit.  2.  Patient given educational materials - see patient instructions  3.  Patient was provided AVS.  4.  Discussed use, benefit, and side effects of prescribed medications.  Barriers to medication compliance addressed.  All patient questions answered.Pt voiced understanding.   5.

## 2024-10-04 ASSESSMENT — ENCOUNTER SYMPTOMS
EYES NEGATIVE: 1
ALLERGIC/IMMUNOLOGIC NEGATIVE: 1
RESPIRATORY NEGATIVE: 1
GASTROINTESTINAL NEGATIVE: 1

## 2024-10-08 ENCOUNTER — TELEPHONE (OUTPATIENT)
Dept: NEUROLOGY | Age: 44
End: 2024-10-08

## 2024-10-08 NOTE — TELEPHONE ENCOUNTER
Patient called into the office regarding difficulty with ambulation. She states that she and her  have been sharing concerns with her medication regimen, and is wondering if this is a side effect to any of the medications she is currently taking.     Patient is also wondering about disability paperwork as recommended by her . I advised her that I would have Rhona SANTOS reach out to her when she is back in the office.

## 2024-10-11 NOTE — TELEPHONE ENCOUNTER
Please let the patient know that as far as I know, medications should not be doing it.    Please give her a video visit follow-up visit with me on Monday, 10/14/2024.  I will discuss more at that point of time.    Thank you.   -dr. russo

## 2024-10-11 NOTE — PROGRESS NOTES
NEUROLOGY FOLLOW-UP VISIT   Patient Name:       Leatha Gutierrez  :        1980  Clinic Visit Date:    10/14/2024  LOV: 2024       Leatha Gutierrez, was evaluated through a synchronous (real-time) audio-video encounter. The patient (or guardian if applicable) is aware that this is a billable service, which includes applicable co-pays. This Virtual Visit was conducted with patient's (and/or legal guardian's) consent. Patient identification was verified, and a caregiver was present when appropriate.   The patient was located at Home: [address] [place] [id number]  Provider was located at Facility (Appt Dept): [address] [facility] [address]  [place] [address]  Confirm you are appropriately licensed, registered, or certified to deliver care in the state where the patient is located as indicated above. If you are not or unsure, please re-schedule the visit: Yes, I confirm.         Dear Dr. Lange, Katie SMITH, APRN - CNP     I saw  Ms. Leatha Gutierrez in virtual visit follow-up visit today in continuation of neurologic care.  As you know she  is a 44 y.o.  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  She comes to the clinic stating that she is still having fatigue and dizziness with no vertigo and no syncopal attacks but has been having lightheadedness and \"unstable on my feet\" and also \"vision like underwater\" \"also like moving\"and she is strongly advised to see ophthalmologist. She has not seen any ophthalmologist due to proximity issues. She is having severe unprovoked fatigue and she does not think anxiety and depression is not the main issue. Taking Amantadine bid (in am and noon) at 200/day and it's not helping.         2024 visit:  Prior to last visit; she was having worsening headaches with the dizziness and vertigo; MRI brain with contrast done in 2023 was negative for new MS lesions and no evidence of contrast-enhancement.  She wanted to go back to work on gabapentin/muscle relaxer

## 2024-10-11 NOTE — TELEPHONE ENCOUNTER
Sent patient a [employer] message to call the office so that we can put her on Monday's schedule for a VV.  Mary Kay

## 2024-10-14 ENCOUNTER — TELEMEDICINE (OUTPATIENT)
Dept: NEUROLOGY | Age: 44
End: 2024-10-14
Payer: COMMERCIAL

## 2024-10-14 DIAGNOSIS — H81.8X9 PERSISTENT POSTURAL-PERCEPTUAL DIZZINESS: ICD-10-CM

## 2024-10-14 DIAGNOSIS — R26.2 DIFFICULTY IN WALKING: ICD-10-CM

## 2024-10-14 DIAGNOSIS — R26.9 GAIT DIFFICULTY: ICD-10-CM

## 2024-10-14 DIAGNOSIS — G35 MULTIPLE SCLEROSIS, RELAPSING-REMITTING (HCC): Primary | ICD-10-CM

## 2024-10-14 DIAGNOSIS — R53.83 OTHER FATIGUE: ICD-10-CM

## 2024-10-14 LAB — NONINV COLON CA DNA+OCC BLD SCRN STL QL: NORMAL

## 2024-10-14 PROCEDURE — G8427 DOCREV CUR MEDS BY ELIG CLIN: HCPCS | Performed by: PSYCHIATRY & NEUROLOGY

## 2024-10-14 PROCEDURE — 99214 OFFICE O/P EST MOD 30 MIN: CPT | Performed by: PSYCHIATRY & NEUROLOGY

## 2024-10-14 RX ORDER — MODAFINIL 200 MG/1
TABLET ORAL
Qty: 30 TABLET | Refills: 1 | Status: SHIPPED | OUTPATIENT
Start: 2024-10-14 | End: 2024-12-23

## 2024-10-15 ENCOUNTER — TELEPHONE (OUTPATIENT)
Dept: PRIMARY CARE CLINIC | Age: 44
End: 2024-10-15

## 2024-10-15 NOTE — TELEPHONE ENCOUNTER
----- Message from POLY Doss CNP sent at 10/15/2024  8:09 AM EDT -----  Please notify patient of results and need to repeat this test.  Thanks Katie

## 2024-10-15 NOTE — TELEPHONE ENCOUNTER
Left message on patients number and on husbands phone number also. No answer, will send a letter to patients home address asking for a return call to schedule a visit.

## 2024-10-18 ENCOUNTER — HOSPITAL ENCOUNTER (OUTPATIENT)
Dept: PHYSICAL THERAPY | Age: 44
Setting detail: THERAPIES SERIES
Discharge: HOME | End: 2024-10-18
Payer: COMMERCIAL

## 2024-10-18 PROCEDURE — 97750 PHYSICAL PERFORMANCE TEST: CPT

## 2024-10-18 NOTE — PLAN OF CARE
FCE:    Patient was seen today for an functional capacity evaluation which was documented in [employer].  The FCE will be uploaded to the patient's chart at a later date.       Lamonte Soliman PT, DPT, OCS, Cert. DN

## 2024-10-25 DIAGNOSIS — G35 MULTIPLE SCLEROSIS, RELAPSING-REMITTING (HCC): ICD-10-CM

## 2024-10-25 DIAGNOSIS — H81.8X9 PERSISTENT POSTURAL-PERCEPTUAL DIZZINESS: ICD-10-CM

## 2024-10-25 DIAGNOSIS — R26.2 DIFFICULTY IN WALKING: ICD-10-CM

## 2024-10-25 DIAGNOSIS — R26.9 GAIT DIFFICULTY: ICD-10-CM

## 2024-10-29 ENCOUNTER — HOSPITAL ENCOUNTER (OUTPATIENT)
Dept: MRI IMAGING | Age: 44
Discharge: HOME | End: 2024-10-31
Attending: PSYCHIATRY & NEUROLOGY
Payer: COMMERCIAL

## 2024-10-29 DIAGNOSIS — H81.8X9 PERSISTENT POSTURAL-PERCEPTUAL DIZZINESS: ICD-10-CM

## 2024-10-29 DIAGNOSIS — R26.9 GAIT DIFFICULTY: ICD-10-CM

## 2024-10-29 DIAGNOSIS — R26.2 DIFFICULTY IN WALKING: ICD-10-CM

## 2024-10-29 DIAGNOSIS — G35 MULTIPLE SCLEROSIS, RELAPSING-REMITTING (HCC): ICD-10-CM

## 2024-10-29 PROCEDURE — 6360000004 HC RX CONTRAST MEDICATION: Performed by: PSYCHIATRY & NEUROLOGY

## 2024-10-29 PROCEDURE — A9579 GAD-BASE MR CONTRAST NOS,1ML: HCPCS | Performed by: PSYCHIATRY & NEUROLOGY

## 2024-10-29 PROCEDURE — 72156 MRI NECK SPINE W/O & W/DYE: CPT

## 2024-10-29 PROCEDURE — 72157 MRI CHEST SPINE W/O & W/DYE: CPT

## 2024-10-29 PROCEDURE — 70553 MRI BRAIN STEM W/O & W/DYE: CPT

## 2024-10-29 RX ADMIN — GADOTERIDOL 20 ML: 279.3 INJECTION, SOLUTION INTRAVENOUS at 09:04

## 2024-10-30 ENCOUNTER — HOSPITAL ENCOUNTER (OUTPATIENT)
Dept: NUCLEAR MEDICINE | Age: 44
Discharge: HOME | End: 2024-11-01
Payer: COMMERCIAL

## 2024-10-30 ENCOUNTER — HOSPITAL ENCOUNTER (OUTPATIENT)
Dept: NUCLEAR MEDICINE | Age: 44
Discharge: HOME | End: 2024-11-01

## 2024-10-30 DIAGNOSIS — R74.8 ELEVATED ALKALINE PHOSPHATASE LEVEL: ICD-10-CM

## 2024-10-30 DIAGNOSIS — R52 GENERALIZED PAIN: ICD-10-CM

## 2024-10-30 PROCEDURE — 78306 BONE IMAGING WHOLE BODY: CPT | Performed by: NURSE PRACTITIONER

## 2024-10-30 PROCEDURE — 3430000000 HC RX DIAGNOSTIC RADIOPHARMACEUTICAL: Performed by: NURSE PRACTITIONER

## 2024-10-30 PROCEDURE — A9503 TC99M MEDRONATE: HCPCS | Performed by: NURSE PRACTITIONER

## 2024-10-30 RX ORDER — TC 99M MEDRONATE 20 MG/10ML
20 INJECTION, POWDER, LYOPHILIZED, FOR SOLUTION INTRAVENOUS
Status: COMPLETED | OUTPATIENT
Start: 2024-10-30 | End: 2024-10-30

## 2024-10-30 RX ADMIN — TC 99M MEDRONATE 20 MILLICURIE: 20 INJECTION, POWDER, LYOPHILIZED, FOR SOLUTION INTRAVENOUS at 10:03

## 2024-11-01 ENCOUNTER — TELEPHONE (OUTPATIENT)
Dept: NEUROLOGY | Age: 44
End: 2024-11-01

## 2024-11-05 ENCOUNTER — TELEPHONE (OUTPATIENT)
Dept: PRIMARY CARE CLINIC | Age: 44
End: 2024-11-05

## 2024-11-05 LAB — NONINV COLON CA DNA+OCC BLD SCRN STL QL: NEGATIVE

## 2024-11-05 NOTE — TELEPHONE ENCOUNTER
----- Message from POLY Doss CNP sent at 11/5/2024  1:02 PM EST -----  Please notify patient that their Colon screening was negative.

## 2024-11-11 ENCOUNTER — TELEPHONE (OUTPATIENT)
Dept: PRIMARY CARE CLINIC | Age: 44
End: 2024-11-11

## 2024-11-11 NOTE — TELEPHONE ENCOUNTER
----- Message from POLY Doss CNP sent at 11/11/2024  2:35 PM EST -----  Please notify patient of bone scan results.  The scan shows degenerative changes but no osseous metastatic disease.  Thanks Katie

## 2024-11-27 ENCOUNTER — TELEPHONE (OUTPATIENT)
Dept: NEUROLOGY | Age: 44
End: 2024-11-27

## 2024-11-27 DIAGNOSIS — M79.2 NEUROPATHIC PAIN: Primary | ICD-10-CM

## 2024-11-27 DIAGNOSIS — G35 MULTIPLE SCLEROSIS, RELAPSING-REMITTING (HCC): ICD-10-CM

## 2024-11-27 RX ORDER — GABAPENTIN 100 MG/1
CAPSULE ORAL
Qty: 270 CAPSULE | Refills: 1 | Status: SHIPPED | OUTPATIENT
Start: 2024-11-27 | End: 2025-11-24

## 2024-11-27 NOTE — TELEPHONE ENCOUNTER
Got the message; thank you.   If she is having lot of pain with numbness; taking Gabapentin 100 mg tid in addition to night time dose would hlep.   If its not helping; better to be seen in ER to evaluate for MS exacerbation  Please let the patient know that   Thank you.   -dr. russo

## 2024-11-27 NOTE — TELEPHONE ENCOUNTER
Leatha called the office today with new onset symptoms.  Patient stated \"I know it's not a full flare up but I wanted to let you know what was going on.  Patient stated that she had similar symptoms years ago, but have returned this last week.  Leatha stated that last Friday numbness started in her left hand, moved up the left arm and down to the ball of her left foot and has now gone to the toes of her right foot.  She is more unsteady with her gait due the numbness.  She is also having alteration in her vision.  She gave an example of when a ceiling fan is on and you see the movement, like a pulsating movement that will happen intermittently in her left eye.  Leatha stated that she is having more headaches in the last week, \"really bad headaches around my temples mainly\".  She describes always having a dull pain, however towards the evening the pain will get worse.  She will take 600 mg of ibuprofen and if this doesn't help she will take another 200 mg tablet which will usually then cause her to go to sleep.   Leatha did admit to falling a month ago while going up the stairs and one of her toes \"dragged\" causing her to trip and fall.  Writer confirmed that she fell forward toward the landing, not down the stairs.  Leatha stated that this was before the numbness started.  Writer advised that Dr. Andrade was rounding at the hospital this week.  I would forward this message to him, however with the upcoming holiday, and the doctor being at the hospital, writer advised that if symptoms got worse and she felt she needed to be seen before we were able to get back with her she should go to the ER.  Leatha verbally stated her understanding.

## 2024-12-02 ENCOUNTER — APPOINTMENT (OUTPATIENT)
Dept: CT IMAGING | Age: 44
End: 2024-12-02
Payer: COMMERCIAL

## 2024-12-02 ENCOUNTER — HOSPITAL ENCOUNTER (EMERGENCY)
Age: 44
Discharge: HOME | End: 2024-12-02
Attending: EMERGENCY MEDICINE
Payer: COMMERCIAL

## 2024-12-02 ENCOUNTER — TELEPHONE (OUTPATIENT)
Dept: NEUROLOGY | Age: 44
End: 2024-12-02

## 2024-12-02 VITALS
DIASTOLIC BLOOD PRESSURE: 81 MMHG | BODY MASS INDEX: 36.02 KG/M2 | HEART RATE: 79 BPM | OXYGEN SATURATION: 97 % | SYSTOLIC BLOOD PRESSURE: 126 MMHG | RESPIRATION RATE: 23 BRPM | TEMPERATURE: 98.2 F | WEIGHT: 230 LBS

## 2024-12-02 DIAGNOSIS — R20.2 ARM PARESTHESIA, LEFT: Primary | ICD-10-CM

## 2024-12-02 DIAGNOSIS — R20.2 PARESTHESIAS: ICD-10-CM

## 2024-12-02 LAB
ANION GAP SERPL CALCULATED.3IONS-SCNC: 8 MMOL/L (ref 9–16)
BASOPHILS # BLD: 0.03 K/UL (ref 0–0.2)
BASOPHILS NFR BLD: 1 % (ref 0–2)
BUN SERPL-MCNC: 8 MG/DL (ref 6–20)
BUN/CREAT SERPL: 11 (ref 9–20)
CALCIUM SERPL-MCNC: 9 MG/DL (ref 8.6–10.4)
CHLORIDE SERPL-SCNC: 108 MMOL/L (ref 98–107)
CO2 SERPL-SCNC: 24 MMOL/L (ref 20–31)
CREAT SERPL-MCNC: 0.7 MG/DL (ref 0.5–0.9)
EOSINOPHIL # BLD: 0.17 K/UL (ref 0–0.44)
EOSINOPHILS RELATIVE PERCENT: 4 % (ref 1–4)
ERYTHROCYTE [DISTWIDTH] IN BLOOD BY AUTOMATED COUNT: 13.3 % (ref 11.8–14.4)
GFR, ESTIMATED: >90 ML/MIN/1.73M2
GLUCOSE SERPL-MCNC: 94 MG/DL (ref 74–99)
HCT VFR BLD AUTO: 35.5 % (ref 36.3–47.1)
HGB BLD-MCNC: 12.4 G/DL (ref 11.9–15.1)
IMM GRANULOCYTES # BLD AUTO: <0.03 K/UL (ref 0–0.3)
IMM GRANULOCYTES NFR BLD: 0 %
LYMPHOCYTES NFR BLD: 1.16 K/UL (ref 1.1–3.7)
LYMPHOCYTES RELATIVE PERCENT: 26 % (ref 24–43)
MAGNESIUM SERPL-MCNC: 2 MG/DL (ref 1.6–2.6)
MCH RBC QN AUTO: 29.5 PG (ref 25.2–33.5)
MCHC RBC AUTO-ENTMCNC: 34.9 G/DL (ref 28.4–34.8)
MCV RBC AUTO: 84.3 FL (ref 82.6–102.9)
MONOCYTES NFR BLD: 0.4 K/UL (ref 0.1–1.2)
MONOCYTES NFR BLD: 9 % (ref 3–12)
NEUTROPHILS NFR BLD: 60 % (ref 36–65)
NEUTS SEG NFR BLD: 2.64 K/UL (ref 1.5–8.1)
NRBC BLD-RTO: 0 PER 100 WBC
PLATELET # BLD AUTO: 248 K/UL (ref 138–453)
PMV BLD AUTO: 10 FL (ref 8.1–13.5)
POTASSIUM SERPL-SCNC: 4.2 MMOL/L (ref 3.7–5.3)
RBC # BLD AUTO: 4.21 M/UL (ref 3.95–5.11)
SODIUM SERPL-SCNC: 140 MMOL/L (ref 136–145)
WBC OTHER # BLD: 4.4 K/UL (ref 3.5–11.3)

## 2024-12-02 PROCEDURE — 83735 ASSAY OF MAGNESIUM: CPT

## 2024-12-02 PROCEDURE — 70450 CT HEAD/BRAIN W/O DYE: CPT

## 2024-12-02 PROCEDURE — 85025 COMPLETE CBC W/AUTO DIFF WBC: CPT

## 2024-12-02 PROCEDURE — 99284 EMERGENCY DEPT VISIT MOD MDM: CPT

## 2024-12-02 PROCEDURE — 80048 BASIC METABOLIC PNL TOTAL CA: CPT

## 2024-12-02 ASSESSMENT — PAIN DESCRIPTION - LOCATION: LOCATION: BACK

## 2024-12-02 ASSESSMENT — PAIN DESCRIPTION - ORIENTATION: ORIENTATION: LEFT

## 2024-12-02 ASSESSMENT — PAIN SCALES - GENERAL: PAINLEVEL_OUTOF10: 5

## 2024-12-02 ASSESSMENT — LIFESTYLE VARIABLES
HOW MANY STANDARD DRINKS CONTAINING ALCOHOL DO YOU HAVE ON A TYPICAL DAY: PATIENT DOES NOT DRINK
HOW OFTEN DO YOU HAVE A DRINK CONTAINING ALCOHOL: NEVER

## 2024-12-02 ASSESSMENT — PAIN - FUNCTIONAL ASSESSMENT: PAIN_FUNCTIONAL_ASSESSMENT: 0-10

## 2024-12-02 ASSESSMENT — PAIN DESCRIPTION - PAIN TYPE: TYPE: ACUTE PAIN

## 2024-12-02 NOTE — TELEPHONE ENCOUNTER
Received a call from patient. She was just in the [facility] ER. She states that she had a CT Head as well as several labs completed while there, and they had tried to page you in the hospital but they were unable to reach you. Patient did not want to wait at the ER, but she is requesting that you review these. Please advise.

## 2024-12-02 NOTE — ED NOTES
[facility] called with neurology from [facility] on line to speak to Dr. Miranda.  Pt was discharged quite some time ago but Dr. Miranda stated he would speak with provider.

## 2024-12-02 NOTE — ED PROVIDER NOTES
patient had been discharged sometime ago.  He states that he will contact the patient's provider and they will get in touch with her to arrange outpatient follow-up.    CRITICAL CARE:       PROCEDURES:    Procedures    ED Course as of 12/02/24 1622   Mon Dec 02, 2024   1257 Ct negative, page to Neurology for further direction.  [RS]      ED Course User Index  [RS] Zev Miranda MD       DATA FOR LAB AND RADIOLOGY TESTS ORDERED BELOW ARE REVIEWED BY THE ED CLINICIAN:    RADIOLOGY: All x-rays, CT, MRI, and formal ultrasound images (except ED bedside ultrasound) are read by the radiologist, see reports below, unless otherwise noted in MDM or here.  Reports below are reviewed by myself.  CT HEAD WO CONTRAST   Preliminary Result   No acute intracranial abnormality.             LABS: Lab orders shown below, the results are reviewed by myself, and all abnormals are listed below.  Labs Reviewed   BASIC METABOLIC PANEL - Abnormal; Notable for the following components:       Result Value    Chloride 108 (*)     Anion Gap 8 (*)     All other components within normal limits   CBC WITH AUTO DIFFERENTIAL - Abnormal; Notable for the following components:    Hematocrit 35.5 (*)     MCHC 34.9 (*)     All other components within normal limits   MAGNESIUM       Vitals Reviewed:    Vitals:    12/02/24 1245 12/02/24 1404 12/02/24 1419 12/02/24 1434   BP: 131/81 (!) 149/83 (!) 144/76 126/81   Pulse: 69 77 80 79   Resp: 16 18 21 23   Temp:       TempSrc:       SpO2: 100% 99% 96% 97%   Weight:         MEDICATIONS GIVEN TO PATIENT THIS ENCOUNTER:  No orders of the defined types were placed in this encounter.    DISCHARGE PRESCRIPTIONS:  Discharge Medication List as of 12/2/2024  2:53 PM        PHYSICIAN CONSULTS ORDERED THIS ENCOUNTER:  IP CONSULT TO NEUROLOGY  FINAL IMPRESSION      1. Arm paresthesia, left    2. Paresthesias          DISPOSITION/PLAN   DISPOSITION Decision To Discharge 12/02/2024 02:52:37 PM           OUTPATIENT

## 2024-12-03 ENCOUNTER — TELEPHONE (OUTPATIENT)
Dept: PRIMARY CARE CLINIC | Age: 44
End: 2024-12-03

## 2024-12-03 NOTE — TELEPHONE ENCOUNTER
Leatha called the office this morning.  Patient states that she is still having issues of numbness and pain.  This was discussed with Dr. Andrade, who stated that he wanted to see her this Friday at 11:40 am.  Call placed to Leatha and this information was given.  Patient was agreeable with this.  Writer advised of [facility] location and gave address and directions.  Patient verbally stated her understanding.

## 2024-12-03 NOTE — TELEPHONE ENCOUNTER
[facility] Transitions Initial Follow Up Call    Outreach made within 2 business days of discharge: Yes    Patient: Leatha Gutierrez Patient : 1980   MRN: [id number]  Reason for Admission: There are no discharge diagnoses documented for the most recent discharge.  Discharge Date: 24       Spoke with: Leatha     Discharge department/facility: [facility]     TCM Interactive Patient Contact:  Was patient able to fill all prescriptions: Yes  Was patient instructed to bring all medications to the follow-up visit: Yes  Is patient taking all medications as directed in the discharge summary? Yes  Does patient understand their discharge instructions: Yes  Does patient have questions or concerns that need addressed prior to 7-14 day follow up office visit: no    Scheduled appointment with PCP within 7-14 days    Follow Up  Future Appointments   Date Time Provider Department Center   2024 10:00 AM Katie Lange APRN - CNP [facility]   2025 11:00 AM Vinicius Andrade MD Neuro Spec Neurology -       Madison Espinoza MA

## 2024-12-06 ENCOUNTER — OFFICE VISIT (OUTPATIENT)
Dept: NEUROLOGY | Age: 44
End: 2024-12-06
Payer: COMMERCIAL

## 2024-12-06 VITALS
DIASTOLIC BLOOD PRESSURE: 81 MMHG | WEIGHT: 229.4 LBS | BODY MASS INDEX: 36 KG/M2 | HEART RATE: 95 BPM | HEIGHT: 67 IN | SYSTOLIC BLOOD PRESSURE: 122 MMHG

## 2024-12-06 DIAGNOSIS — E53.9 VITAMIN B DEFICIENCY: ICD-10-CM

## 2024-12-06 DIAGNOSIS — G35 MULTIPLE SCLEROSIS, RELAPSING-REMITTING (HCC): ICD-10-CM

## 2024-12-06 DIAGNOSIS — R20.0 LEFT SIDED NUMBNESS: Primary | ICD-10-CM

## 2024-12-06 DIAGNOSIS — R53.83 OTHER FATIGUE: ICD-10-CM

## 2024-12-06 PROCEDURE — G8484 FLU IMMUNIZE NO ADMIN: HCPCS | Performed by: PSYCHIATRY & NEUROLOGY

## 2024-12-06 PROCEDURE — G8417 CALC BMI ABV UP PARAM F/U: HCPCS | Performed by: PSYCHIATRY & NEUROLOGY

## 2024-12-06 PROCEDURE — 1036F TOBACCO NON-USER: CPT | Performed by: PSYCHIATRY & NEUROLOGY

## 2024-12-06 PROCEDURE — G8427 DOCREV CUR MEDS BY ELIG CLIN: HCPCS | Performed by: PSYCHIATRY & NEUROLOGY

## 2024-12-06 PROCEDURE — 99214 OFFICE O/P EST MOD 30 MIN: CPT | Performed by: PSYCHIATRY & NEUROLOGY

## 2024-12-06 RX ORDER — MODAFINIL 200 MG/1
TABLET ORAL
Qty: 30 TABLET | Refills: 0 | Status: SHIPPED | OUTPATIENT
Start: 2024-12-06 | End: 2025-02-14

## 2024-12-06 NOTE — PROGRESS NOTES
exacerbation. She stated that she forgot to see her ophthalmologist as recommended during last visit. She had hx of rt optic neuritis with loss of vision for several days in 2010 while on Avonex. Since that time, she has been following up with ophthalmologist. She has some difficulties with ambulation and she hasn't been to PT recently. She also has not had any blood work recently. She is due for Ocrevus infusion.       September 2023 visit:  She was seen on an emergent basis on 8/17/2023 for intractable dizziness and headache.  Today she comes to clinic for follow-up visit to follow-up on MRI brain study done since then.  She comes to clinic stating that her dizziness and vertigo improved.  Headaches are mild to moderate in severity and mostly tolerable.  She has been taking ibuprofen on as needed basis in addition to gabapentin every evening.  She wants to go back to work and requesting letter stating \"okay to go back to work\".  She also stated that she has occasional dizziness turning her head while driving.  Never lost consciousness.  Denied nausea associated with it.  She is requesting for refills on amantadine.      Last visit on 8/17/23:  seen in the clinic on emergent basis for her symptoms of dizziness and intractable headaches. Her dizziness was vertiginous getting exacerbated by any movement of the head and also feeling nauseated.  She also has been having headaches associated with photophobia and dizziness.  Has not had any relief with NSAIDs.  During last visit on 8/9/2023; patient was sent to ER because of above-mentioned intractable symptoms along with chest tightness.  Patient was seen in ER; CT head done was negative.  EKG and troponin were unremarkable.  Patient has received Toradol, Compazine and Benadryl and Decadron.  Also received IV fluids with some improvement in headaches.  As her testing was unremarkable and also as headaches were improving; patient was discharged to home to be followed up

## 2024-12-19 ENCOUNTER — HOSPITAL ENCOUNTER (OUTPATIENT)
Age: 44
Discharge: HOME | End: 2024-12-19
Payer: COMMERCIAL

## 2024-12-19 ENCOUNTER — OFFICE VISIT (OUTPATIENT)
Dept: PRIMARY CARE CLINIC | Age: 44
End: 2024-12-19
Payer: COMMERCIAL

## 2024-12-19 VITALS
TEMPERATURE: 98.2 F | BODY MASS INDEX: 36.21 KG/M2 | DIASTOLIC BLOOD PRESSURE: 84 MMHG | WEIGHT: 231.2 LBS | SYSTOLIC BLOOD PRESSURE: 122 MMHG | HEART RATE: 95 BPM | OXYGEN SATURATION: 98 % | RESPIRATION RATE: 16 BRPM

## 2024-12-19 DIAGNOSIS — E55.9 VITAMIN D DEFICIENCY: ICD-10-CM

## 2024-12-19 DIAGNOSIS — Z12.31 OTHER SCREENING MAMMOGRAM: ICD-10-CM

## 2024-12-19 DIAGNOSIS — M79.641 PAIN IN BOTH HANDS: ICD-10-CM

## 2024-12-19 DIAGNOSIS — M79.642 PAIN IN BOTH HANDS: ICD-10-CM

## 2024-12-19 DIAGNOSIS — G35 MS (MULTIPLE SCLEROSIS) (HCC): Primary | ICD-10-CM

## 2024-12-19 DIAGNOSIS — E53.9 VITAMIN B DEFICIENCY: ICD-10-CM

## 2024-12-19 DIAGNOSIS — R20.0 BILATERAL HAND NUMBNESS: ICD-10-CM

## 2024-12-19 LAB
25(OH)D3 SERPL-MCNC: 37.9 NG/ML (ref 30–100)
FOLATE SERPL-MCNC: 11.5 NG/ML (ref 4.8–24.2)
VIT B12 SERPL-MCNC: 472 PG/ML (ref 232–1245)

## 2024-12-19 PROCEDURE — 1036F TOBACCO NON-USER: CPT | Performed by: NURSE PRACTITIONER

## 2024-12-19 PROCEDURE — 36415 COLL VENOUS BLD VENIPUNCTURE: CPT

## 2024-12-19 PROCEDURE — 82306 VITAMIN D 25 HYDROXY: CPT

## 2024-12-19 PROCEDURE — G8417 CALC BMI ABV UP PARAM F/U: HCPCS | Performed by: NURSE PRACTITIONER

## 2024-12-19 PROCEDURE — G8427 DOCREV CUR MEDS BY ELIG CLIN: HCPCS | Performed by: NURSE PRACTITIONER

## 2024-12-19 PROCEDURE — 99214 OFFICE O/P EST MOD 30 MIN: CPT | Performed by: NURSE PRACTITIONER

## 2024-12-19 PROCEDURE — 82607 VITAMIN B-12: CPT

## 2024-12-19 PROCEDURE — 82746 ASSAY OF FOLIC ACID SERUM: CPT

## 2024-12-19 PROCEDURE — 84207 ASSAY OF VITAMIN B-6: CPT

## 2024-12-19 PROCEDURE — G8484 FLU IMMUNIZE NO ADMIN: HCPCS | Performed by: NURSE PRACTITIONER

## 2024-12-19 ASSESSMENT — PATIENT HEALTH QUESTIONNAIRE - PHQ9
SUM OF ALL RESPONSES TO PHQ QUESTIONS 1-9: 0
2. FEELING DOWN, DEPRESSED OR HOPELESS: NOT AT ALL
SUM OF ALL RESPONSES TO PHQ9 QUESTIONS 1 & 2: 0
1. LITTLE INTEREST OR PLEASURE IN DOING THINGS: NOT AT ALL

## 2024-12-19 ASSESSMENT — ENCOUNTER SYMPTOMS
EYES NEGATIVE: 1
GASTROINTESTINAL NEGATIVE: 1
ALLERGIC/IMMUNOLOGIC NEGATIVE: 1
RESPIRATORY NEGATIVE: 1

## 2024-12-19 NOTE — PROGRESS NOTES
[facility]  JACQUELINE WILKINSON CNP  [facility]  [address] [address]  Dept: [phone]  Dept Fax: [phone]      Name: Leatha Gutierrez  : 1980         Chief Complaint:     Chief Complaint   Patient presents with    Multiple Sclerosis     3 month check. Patient has MRI on 24. Patient c/o numbness and tingling in both sides. Patient following up with Neuro on 24.        History of Present Illness:      Leatha Gutierrez is a 44 y.o.  female who presents with Multiple Sclerosis (3 month check. Patient has MRI on 24. Patient c/o numbness and tingling in both sides. Patient following up with Neuro on 24. )      GRACIELA Zhang is here today for a routine office visit.  She does follows up with Neurology for her MS.  She was recently in the hospital for an MS flare up and saw Neurology on 2024.  He has ordered labs and an MRI.  He is also checking for a Vitamin B deficiency.  She is scheduled to get her next infusion of Ocrevus on 25.  Follow up with Neurology on 24.    Her MS symptoms are worsening where they were only on the left side of her body but now are moving to the right side of her body.  Her left leg and left hand are the most numb.  She does have numbness in her face.  At times she has lost strength in the left hand but states if she concentrates she can hold on to things.  The numbness feeling in her hands feels like it is worsening.    Past Medical History:     Past Medical History:   Diagnosis Date    Acute relapsing multiple sclerosis (HCC)     Lumbar herniated disc 2014    back injury     MS (multiple sclerosis) (HCC)       Reviewed all health maintenance requirements and ordered appropriate tests  Health Maintenance Due   Topic Date Due    Breast cancer screen  2023    Flu vaccine (1) 2024    COVID-19 Vaccine (3 - 2023-24 season) 2024       Past Surgical History:     Past Surgical History:   Procedure

## 2024-12-19 NOTE — PATIENT INSTRUCTIONS
SURVEY:     You may be receiving a survey from [facility] [employer] regarding your visit today.     Please complete the survey to enable us to provide the highest quality of care to you and your family.     If you cannot score us a very good on any question, please call the office to discuss how we could have made your experience a very good one.     Thank you,    London Patel, APRN-CNP  Katie Lange, APRN-CNP  Helen, LPN  Alma, CMA  Subhash, CMA  Madison, CMA  Blessing, PCA  Lexie, CMA  Sharonda, PM

## 2024-12-22 LAB — PYRIDOXAL PHOS SERPL-SCNC: 41.3 NMOL/L (ref 20–125)

## 2024-12-23 ENCOUNTER — TELEPHONE (OUTPATIENT)
Dept: PRIMARY CARE CLINIC | Age: 44
End: 2024-12-23

## 2024-12-23 NOTE — TELEPHONE ENCOUNTER
----- Message from POLY Doss CNP sent at 12/23/2024  9:36 AM EST -----  Please notify patient of normal Vitamin D results.  Thanks Katie

## 2024-12-26 ENCOUNTER — HOSPITAL ENCOUNTER (OUTPATIENT)
Dept: MRI IMAGING | Age: 44
Discharge: HOME | End: 2024-12-28
Attending: PSYCHIATRY & NEUROLOGY
Payer: COMMERCIAL

## 2024-12-26 DIAGNOSIS — G35 MULTIPLE SCLEROSIS, RELAPSING-REMITTING (HCC): ICD-10-CM

## 2024-12-26 PROCEDURE — 70553 MRI BRAIN STEM W/O & W/DYE: CPT

## 2024-12-26 PROCEDURE — A9579 GAD-BASE MR CONTRAST NOS,1ML: HCPCS | Performed by: PSYCHIATRY & NEUROLOGY

## 2024-12-26 PROCEDURE — 6360000004 HC RX CONTRAST MEDICATION: Performed by: PSYCHIATRY & NEUROLOGY

## 2024-12-26 RX ADMIN — GADOTERIDOL 20 ML: 279.3 INJECTION, SOLUTION INTRAVENOUS at 14:31

## 2025-01-08 ENCOUNTER — HOSPITAL ENCOUNTER (OUTPATIENT)
Dept: OCCUPATIONAL THERAPY | Age: 45
Setting detail: THERAPIES SERIES
Discharge: HOME | End: 2025-01-08
Payer: COMMERCIAL

## 2025-01-08 ENCOUNTER — TELEPHONE (OUTPATIENT)
Dept: PRIMARY CARE CLINIC | Age: 45
End: 2025-01-08

## 2025-01-08 ENCOUNTER — HOSPITAL ENCOUNTER (OUTPATIENT)
Dept: PHYSICAL THERAPY | Age: 45
Setting detail: THERAPIES SERIES
Discharge: HOME | End: 2025-01-08
Payer: COMMERCIAL

## 2025-01-08 DIAGNOSIS — G35 MS (MULTIPLE SCLEROSIS) (HCC): ICD-10-CM

## 2025-01-08 DIAGNOSIS — M79.642 PAIN IN BOTH HANDS: Primary | ICD-10-CM

## 2025-01-08 DIAGNOSIS — R20.0 BILATERAL HAND NUMBNESS: ICD-10-CM

## 2025-01-08 DIAGNOSIS — M25.542 ARTHRALGIA OF HANDS, BILATERAL: ICD-10-CM

## 2025-01-08 DIAGNOSIS — M25.541 ARTHRALGIA OF HANDS, BILATERAL: ICD-10-CM

## 2025-01-08 DIAGNOSIS — M79.641 PAIN IN BOTH HANDS: Primary | ICD-10-CM

## 2025-01-08 PROCEDURE — 97110 THERAPEUTIC EXERCISES: CPT

## 2025-01-08 PROCEDURE — 97140 MANUAL THERAPY 1/> REGIONS: CPT

## 2025-01-08 PROCEDURE — 97165 OT EVAL LOW COMPLEX 30 MIN: CPT

## 2025-01-08 PROCEDURE — 97018 PARAFFIN BATH THERAPY: CPT

## 2025-01-08 NOTE — TELEPHONE ENCOUNTER
Lexie from [facility] called to request Leatha Gutierrez's referral be changed from PT to OT    If in agreement, please sign.

## 2025-01-08 NOTE — PLAN OF CARE
Phone: [phone]                      [facility]           Fax: [phone]                           Outpatient Occupational Therapy                                                                            Initial Evaluation      Name:  Leatha HASTINGS.: [birth year]  Date: 2025  Referring Physician: Katie Lange   Medical Diagnosis: Diagnosis: B hand numbness, R20.0; Pain in B hands, M79.641; MS, G35  Rehab Diagnosis/ICD-10#s:weakness, M62.81  Insurance: OT Insurance Information: [facility]  Total # of Visits to Date: 1  Next  Appointment: N/A at this time  Chief Complaint: L thumb pain  CSN #: [id number]    Onset Date: 11/15/24    Mechanism of Injury: No known injury    Precautions:   [x]None  [] Fall Risk  []WB Status  [] Pacemaker   [x]Other: hx MS            Involved Extremity:      [x] Left [] Right  Dominant: [] Left [x]Right    Work Status:   [] Normal [] Restricted [] Off D/T Injury/Condition [] Retired [x] Unemployed [x] Disabled []Other:  Critical Job/Daily Tasks:       Subjective:  Patient presents this date with increased c/o L thumb pain, radial side into wrist and forearm. Patient states that this has been ongoing for the past year, but has gotten worse. Patient also with c/o numbness, however, numbness is throughout entire L side body d/t MS. Patient stated that numbness started in her hand and radiated to L half of body. Patient goal to address L thumb.     PMH: MS    Pain: Intensity:  4/10 Location:   L thumb, radial side MC bone  Pain Type: [] Constant [x] Intermittent   [  ] with pain meds at rest   [x] With movement/Resistive activity [] With Sedentary activity      Objective:    THUMB ROM RIGHT LEFT   MP 55 60*   IP 55 56*   Sky AB 18.0 cm 17.0 cm*   Radial AB 16.5 cm 13.5 cm*    * Indicates pain with testing      WRIST AROM Right Left   Wrist Flexion 81 81   Wrist Extension 76 75   Ulnar Deviation 35 30   Radial Deviation 30 25      /PINCH

## 2025-01-10 ENCOUNTER — HOSPITAL ENCOUNTER (OUTPATIENT)
Dept: OCCUPATIONAL THERAPY | Age: 45
Setting detail: THERAPIES SERIES
Discharge: HOME | End: 2025-01-10
Payer: COMMERCIAL

## 2025-01-10 NOTE — PROGRESS NOTES
[facility]  Inpatient/Observation/Outpatient Rehabilitation    Date: 1/10/2025  Patient Name: Leatha Gutierrez       [] Inpatient Acute/Observation       [x]  Outpatient  : 1980     Plan of Care/Recert ends    [] Pt no showed for scheduled appointment    [] As a reminder, pt was contacted/attempted contact via phone of upcoming appointments.    [] Pt refused/declined therapy at this time due to:           [x] Pt cancelled due to:  [x] No Reason Given   [] Sick/ill   [] Other:     [] Evaluation held by RN/Provider due to:    [] High Heart Rate   [] High Blood Pressure   [] Orthopedic Consult   [] Hgb < 7   [] Other:    [] Pt ordered brace per physician request:  [] Proper fit will be completed and education for wearing/skin checks    [] Pt does not require skilled services due to:      Therapist/Assistant will attempt to see this patient, at our earliest opportunity.       Lexie Rivera Date: 1/10/2025

## 2025-01-13 ENCOUNTER — HOSPITAL ENCOUNTER (OUTPATIENT)
Dept: OCCUPATIONAL THERAPY | Age: 45
Setting detail: THERAPIES SERIES
Discharge: HOME | End: 2025-01-13
Payer: COMMERCIAL

## 2025-01-13 PROCEDURE — 97140 MANUAL THERAPY 1/> REGIONS: CPT

## 2025-01-13 PROCEDURE — 97018 PARAFFIN BATH THERAPY: CPT

## 2025-01-13 PROCEDURE — 97035 APP MDLTY 1+ULTRASOUND EA 15: CPT

## 2025-01-13 NOTE — PROGRESS NOTES
Phone: [phone]                 [facility]    Fax: [phone]                       Outpatient Occupational Therapy                 DAILY TREATMENT NOTE    Date: 1/13/2025  Patient’s Name:  Leatha Gutierrez  YOB: 1980 (44 y.o.)  Gender:  female  MRN:  [id number]  [facility] #: [id number]  Medical Diagnosis: B hand numbness, R20.0; Pain in B hands, M79.641; MS, G35    Referring Physician: Geovanna Andrade*     INSURANCE  OT Insurance Information: [facility]   Total # of Visits Approved: 8   Total # of Visits to Date: 2       PAIN  []No     [x]Yes      Location: L thumb  Pain Rating (0-10 pain scale): 3/10  Pain Description:     SUBJECTIVE   Patient states that she has been doing HEP. No questions at this time.    Flow Sheet   Exercise &   Manual treatment Weight/  Level Reps/Time Comments    Joint mobilizations and distractions x x L thumb and wrist   PROM c progressive stretch x x L wrist and thumb   STM x x Chip clip x 2 for 1 min each on thumb/index webspace and ulnar/hypothenar eminence; Cupping and use of small instrument L thumb 1st dorsal compartment                                                                         All therapeutic exercises and manual treatment completed to improve ROM and functional use of affected extremity.    Therapeutic Activities / NMR Time  Task                                       Modality Flow Sheet:   START STOP Tx Modality     Electrical Stim:      8 minutes Ultrasound: _.8__ W/cm2 x __8_ mins  Duty factor: _x_100%  __50%  __20% __10%  Head size:   MHz: __1mHz __2 mHz  _x_3mHz  Location: L thumb/radial wrist for pain. Patient tolerated well c skin intact pre and post treatment.    10 minutes Hot Pack: L hand for pain and stiffness. Combined with paraffin. Patient tolerated well c skin intact pre and post treatment.    10 minutes Paraffin: L hand for pain and stiffness. Combined with HP. Patient tolerated well c skin intact pre and post

## 2025-01-16 ENCOUNTER — HOSPITAL ENCOUNTER (OUTPATIENT)
Dept: OCCUPATIONAL THERAPY | Age: 45
Setting detail: THERAPIES SERIES
Discharge: HOME | End: 2025-01-16
Payer: COMMERCIAL

## 2025-01-16 NOTE — PROGRESS NOTES
[facility]  Inpatient/Observation/Outpatient Rehabilitation    Date: 2025  Patient Name: Leatha Gutierrez       [] Inpatient Acute/Observation       [x]  Outpatient  : 1980     Plan of Care/Recert ends    [] Pt no showed for scheduled appointment    [] As a reminder, pt was contacted/attempted contact via phone of upcoming appointments.    [] Pt refused/declined therapy at this time due to:           [x] Pt cancelled due to:  [x] No Reason Given   [] Sick/ill   [] Other:      [] Evaluation held by RN/Provider due to:    [] High Heart Rate   [] High Blood Pressure   [] Orthopedic Consult   [] Hgb < 7   [] Other:    [] Pt ordered brace per physician request:  [] Proper fit will be completed and education for wearing/skin checks    [] Pt does not require skilled services due to:      Therapist/Assistant will attempt to see this patient, at our earliest opportunity.       Lexie Rivear Date: 2025

## 2025-01-20 ENCOUNTER — HOSPITAL ENCOUNTER (OUTPATIENT)
Dept: OCCUPATIONAL THERAPY | Age: 45
Setting detail: THERAPIES SERIES
Discharge: HOME | End: 2025-01-20
Payer: COMMERCIAL

## 2025-01-20 PROCEDURE — 97110 THERAPEUTIC EXERCISES: CPT

## 2025-01-20 PROCEDURE — 97018 PARAFFIN BATH THERAPY: CPT

## 2025-01-20 PROCEDURE — 97035 APP MDLTY 1+ULTRASOUND EA 15: CPT

## 2025-01-20 NOTE — PROGRESS NOTES
Phone: [phone]                 [facility]    Fax: [phone]                       Outpatient Occupational Therapy                 DAILY TREATMENT NOTE    Date: 1/20/2025  Patient’s Name:  Leatha Gutierrez  YOB: 1980 (45 y.o.)  Gender:  female  MRN:  [id number]  [facility] #: [id number]  Medical Diagnosis: B hand numbness, R20.0; Pain in B hands, M79.641; MS, G35    Referring Physician: Geovanna Andrade*     INSURANCE  OT Insurance Information: [facility]   Total # of Visits Approved: 8   Total # of Visits to Date: 3       PAIN  []No     [x]Yes      Location: L thumb/thenar eminence   Pain Rating (0-10 pain scale): did not rate  Pain Description:     SUBJECTIVE   Patient with no new complaints. States radial wrist feels better.             Flow Sheet   Exercise &   Manual treatment Weight/  Level Reps/Time Comments    Joint mobilizations and distractions x x L thumb and wrist   PROM c progressive stretch x x L wrist and thumb   STM x x Chip clip x 2 for 1 min each on thumb/index webspace and ulnar/hypothenar eminence; Cupping and use of small instrument L thumb 1st dorsal compartment   Flexbar Yellow x15 Twists and bends to improve strength   Power web Yellow x15 Digit flexion - unable to tolerate extension   Egg Yellow x15 Pinches and  to improve strength                                                                  All therapeutic exercises and manual treatment completed to improve ROM and functional use of affected extremity.    Therapeutic Activities / NMR Time  Task                                                    Modality Flow Sheet:   START STOP Tx Modality       Electrical Stim:        8 minutes Ultrasound: _1.0__ W/cm2 x __8_ mins  Duty factor: _x_100%  __50%  __20% __10%  Head size:   MHz: __1mHz __2 mHz  _x_3mHz  Location: L thumb/radial wrist for pain. Patient tolerated well c skin intact pre and post treatment.     10 minutes Hot Pack: L hand for pain and

## 2025-01-20 NOTE — PROGRESS NOTES
NEUROLOGY FOLLOW-UP VISIT   Patient Name:       Leatha Gutierrez  :        1980  Clinic Visit Date:    2025  LOV: 2024       Leatha Gutierrez, was evaluated through a synchronous (real-time) audio-video encounter. The patient (or guardian if applicable) is aware that this is a billable service, which includes applicable co-pays. This Virtual Visit was conducted with patient's (and/or legal guardian's) consent. Patient identification was verified, and a caregiver was present when appropriate.   The patient was located at Home: [address] [place] [id number]  Provider was located at  Facility ([facility]): [address] [place] [address] [address]  [place] [address]      Dear Dr. Lange, Katie SMITH, APRN - CNP     I saw  Ms. Leatha Gutierrez  in follow-up visit today on unscheduled visit for concern of   As you know she  is a 45 y.o.  female with relapsing remitting multiple sclerosis and on DMT with IV Ocrevus infusion therapy. Her last infusion is scheduled for 2025.  Since her last visit in 2024; she had blood work and MRI brain study but she stopped taking modafinil and gabapentin and \"doing better and not feeling dizzy\". She also stated that her memory is better by stopping those meds. She feels \"drugged\" and family members noticed her having \"not having clear thought process\".  She is independent of basic and instrumental ADLs.   Patient stated that she has been on marijuana Gummies and she stopped taking gabapentin and modafinil.  She feels better on marijuana Gummies and not having any abnormal dizziness problems associated with gabapentin and modafinil. She is very frustrated about ongoing memory difficulties.  She was clearly explained during this encounter that repeat brain MRI done on 2024 was compared with 10/29/2024 MRI and it did not demonstrate any new MS lesions.  She states \"I have left-sided numbness since my MS diagnosis in \".       2024

## 2025-01-21 ENCOUNTER — TELEPHONE (OUTPATIENT)
Dept: PRIMARY CARE CLINIC | Age: 45
End: 2025-01-21

## 2025-01-21 ENCOUNTER — TELEMEDICINE (OUTPATIENT)
Dept: NEUROLOGY | Age: 45
End: 2025-01-21
Payer: COMMERCIAL

## 2025-01-21 DIAGNOSIS — R20.0 NUMBNESS AND TINGLING OF LEFT ARM AND LEG: ICD-10-CM

## 2025-01-21 DIAGNOSIS — G35 MULTIPLE SCLEROSIS, RELAPSING-REMITTING (HCC): Primary | ICD-10-CM

## 2025-01-21 DIAGNOSIS — R20.2 NUMBNESS AND TINGLING OF LEFT ARM AND LEG: ICD-10-CM

## 2025-01-21 DIAGNOSIS — G35 MS (MULTIPLE SCLEROSIS) (HCC): Primary | ICD-10-CM

## 2025-01-21 DIAGNOSIS — R41.3 MEMORY DIFFICULTIES: ICD-10-CM

## 2025-01-21 DIAGNOSIS — E55.9 VITAMIN D DEFICIENCY: ICD-10-CM

## 2025-01-21 PROCEDURE — G8427 DOCREV CUR MEDS BY ELIG CLIN: HCPCS | Performed by: PSYCHIATRY & NEUROLOGY

## 2025-01-21 PROCEDURE — 99214 OFFICE O/P EST MOD 30 MIN: CPT | Performed by: PSYCHIATRY & NEUROLOGY

## 2025-01-21 NOTE — TELEPHONE ENCOUNTER
Patient contacted the office wanting a new referral to Neurology. Patient is not happy with her current Neurologist and does not feel she is getting anywhere. Patient would like to see Katie Solano at [facility].

## 2025-01-23 ENCOUNTER — HOSPITAL ENCOUNTER (OUTPATIENT)
Dept: OCCUPATIONAL THERAPY | Age: 45
Setting detail: THERAPIES SERIES
Discharge: HOME | End: 2025-01-23
Payer: COMMERCIAL

## 2025-01-23 PROCEDURE — 97140 MANUAL THERAPY 1/> REGIONS: CPT

## 2025-01-23 PROCEDURE — 97035 APP MDLTY 1+ULTRASOUND EA 15: CPT

## 2025-01-23 PROCEDURE — 97018 PARAFFIN BATH THERAPY: CPT

## 2025-01-23 NOTE — PROGRESS NOTES
Phone: [phone]                 [facility]    Fax: [phone]                       Outpatient Occupational Therapy                 DAILY TREATMENT NOTE    Date: 1/23/2025  Patient’s Name:  Leatha Gutierrez  YOB: 1980 (45 y.o.)  Gender:  female  MRN:  [id number]  [facility] #: [id number]  Medical Diagnosis: B hand numbness, R20.0; Pain in B hands, M79.641; MS, G35    Referring Physician: Geovanna Andrade*     INSURANCE  OT Insurance Information: [facility]   Total # of Visits Approved: 8   Total # of Visits to Date: 4       PAIN  []No     []Yes      Location:   Pain Rating (0-10 pain scale):   Pain Description:     SUBJECTIVE                   Flow Sheet   Exercise &   Manual treatment Weight/  Level Reps/Time Comments    Joint mobilizations and distractions x x L thumb and wrist   PROM c progressive stretch x x L wrist and thumb   STM x x Chip clip x 2 for 1 min each on thumb/index webspace and ulnar/hypothenar eminence; Cupping and use of small instrument L thumb 1st dorsal compartment   Flexbar Yellow x15 Twists and bends to improve strength   Power web Yellow x15 Digit flexion - unable to tolerate extension   Egg Yellow x15 Pinches and  to improve strength                                                                         All therapeutic exercises and manual treatment completed to improve ROM and functional use of affected extremity.    Therapeutic Activities / NMR Time  Task                                                            Modality Flow Sheet:   START STOP Tx Modality       Electrical Stim:        8 minutes Ultrasound: _1.0__ W/cm2 x __8_ mins  Duty factor: _x_100%  __50%  __20% __10%  Head size:   MHz: __1mHz __2 mHz  _x_3mHz  Location: L thumb/radial wrist for pain. Patient tolerated well c skin intact pre and post treatment.     10 minutes Hot Pack: L hand for pain and stiffness. Combined with paraffin. Patient tolerated well c skin intact pre and post

## 2025-01-27 ENCOUNTER — HOSPITAL ENCOUNTER (OUTPATIENT)
Dept: OCCUPATIONAL THERAPY | Age: 45
Setting detail: THERAPIES SERIES
Discharge: HOME | End: 2025-01-27
Payer: COMMERCIAL

## 2025-01-27 PROCEDURE — 97140 MANUAL THERAPY 1/> REGIONS: CPT

## 2025-01-27 PROCEDURE — 97018 PARAFFIN BATH THERAPY: CPT

## 2025-01-27 PROCEDURE — 97110 THERAPEUTIC EXERCISES: CPT

## 2025-01-27 NOTE — PROGRESS NOTES
Phone: [phone]                 [facility]    Fax: [phone]                       Outpatient Occupational Therapy                 DAILY TREATMENT NOTE    Date: 1/27/2025  Patient’s Name:  Leatha Gutierrez  YOB: 1980 (45 y.o.)  Gender:  female  MRN:  [id number]  [facility] #: [id number]  Medical Diagnosis: B hand numbness, R20.0; Pain in B hands, M79.641; MS, G35    Referring Physician: Geovanna Andrade*     INSURANCE  OT Insurance Information: [facility]   Total # of Visits Approved: 8   Total # of Visits to Date: 5       PAIN  []No     [x]Yes      Location: L thumb/wrist  Pain Rating (0-10 pain scale): 2/10   Pain Description:     SUBJECTIVE   Reports that thumb hurt this weekend after folding a lot of laundry.              Flow Sheet   Exercise &   Manual treatment Weight/  Level Reps/Time Comments    Joint mobilizations and distractions x x L thumb and wrist   PROM c progressive stretch x x L wrist and thumb   STM x x Chip clip  for 1 min on thumb/index webspace ; Cupping and use of small instrument L thumb 1st dorsal compartment   Flexbar Unable to tolerate this date  Twists and bends to improve strength   Power web Yellow x15 Digit flexion - unable to tolerate extension   Egg Yellow x15 Pinches and  to improve strength                                                                         All therapeutic exercises and manual treatment completed to improve ROM and functional use of affected extremity.    Therapeutic Activities / NMR Time  Task                                                            Modality Flow Sheet:   START STOP Tx Modality       Electrical Stim:        8 minutes Ultrasound: _1.0__ W/cm2 x __8_ mins  Duty factor: _x_100%  __50%  __20% __10%  Head size:   MHz: __1mHz __2 mHz  _x_3mHz  Location: L thumb/radial wrist for pain. Patient tolerated well c skin intact pre and post treatment.     10 minutes Hot Pack: L hand for pain and stiffness. Combined

## 2025-02-05 ENCOUNTER — TELEPHONE (OUTPATIENT)
Dept: NEUROLOGY | Age: 45
End: 2025-02-05

## 2025-02-07 ENCOUNTER — TELEPHONE (OUTPATIENT)
Dept: NEUROLOGY | Age: 45
End: 2025-02-07

## 2025-02-07 NOTE — TELEPHONE ENCOUNTER
Received a call from [facility]. Pt is scheduled next week for her Ocrevus infusion and she does not have an anaphylaxis kit at home, it had . They were asking for a verbal order for Epi 0.3 mg PRN and Benadryl 50 mg PRN. I gave verbal confirmation to fill these medications so that patient is able to have her infusion next week.

## 2025-02-19 ENCOUNTER — TELEPHONE (OUTPATIENT)
Dept: NEUROLOGY | Age: 45
End: 2025-02-19

## 2025-02-19 NOTE — TELEPHONE ENCOUNTER
Received renewal order for [facility] [employer] for patients Ocrevus infusion. Dr. Andrade is out until Monday will have him sign when back in the office.

## 2025-02-25 DIAGNOSIS — E55.9 VITAMIN D DEFICIENCY: ICD-10-CM

## 2025-02-26 RX ORDER — ERGOCALCIFEROL 1.25 MG/1
50000 CAPSULE, LIQUID FILLED ORAL WEEKLY
Qty: 12 CAPSULE | Refills: 1 | Status: SHIPPED | OUTPATIENT
Start: 2025-02-26
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
## 2025-04-30 ENCOUNTER — E-VISIT (OUTPATIENT)
Dept: PRIMARY CARE CLINIC | Age: 45
End: 2025-04-30
Payer: COMMERCIAL

## 2025-04-30 DIAGNOSIS — H10.9 BACTERIAL CONJUNCTIVITIS OF RIGHT EYE: Primary | ICD-10-CM

## 2025-04-30 PROCEDURE — 99421 OL DIG E/M SVC 5-10 MIN: CPT | Performed by: NURSE PRACTITIONER

## 2025-04-30 RX ORDER — TOBRAMYCIN 3 MG/ML
1 SOLUTION/ DROPS OPHTHALMIC EVERY 4 HOURS
Qty: 5 ML | Refills: 0 | Status: SHIPPED | OUTPATIENT
Start: 2025-04-30 | End: 2025-05-10

## 2025-04-30 NOTE — PROGRESS NOTES
Tobramycin 0.3% Ophthalmic solution, 1-2 drops in affected eye every 4 hrs for 1 week  Meticulous handwashing after touching face and before and after using eye drops  Do not touch tip of dropper or tube to eye to prevent contamination  Return to work or school when no longer having eye discharge, usually 24 to 48 hours after starting eye drops.  Avoid wearing eye makeup and dispose of all old makeup as it may be contaminated  Use a new pillowcase each night until symptoms are gone  Wash all towels or cloths that have touched the eyes after each use, do not reuse  Do not resume wearing contact lenses until treatment is completed  Dispose of current contact lenses  Clean eyes with baby shampoo diluted with warm water to remove crusting to eyes  To ER for any visual changes, increased pain in the eye, photophobia (light sensitivity) or increase in eye discharge.        For contact users it is recommend that the current contacts be disposed of.  Do not wear contacts until treatment is complete and condition has resolved.  Encouraged correct and appropriate lens care including: proper hand washing, cleaningand rinsing and frequent lens replacement.    May return to work and or school 24-48 hours of initiating treatment or after symptoms and drainage has stopped.    ER or call 911 if any difficulty breathing, shortness of breath, inability to swallow,hives or temp greater than 103 degrees.

## 2025-06-27 SDOH — ECONOMIC STABILITY: FOOD INSECURITY: WITHIN THE PAST 12 MONTHS, THE FOOD YOU BOUGHT JUST DIDN'T LAST AND YOU DIDN'T HAVE MONEY TO GET MORE.: SOMETIMES TRUE

## 2025-06-27 SDOH — ECONOMIC STABILITY: FOOD INSECURITY: WITHIN THE PAST 12 MONTHS, YOU WORRIED THAT YOUR FOOD WOULD RUN OUT BEFORE YOU GOT MONEY TO BUY MORE.: SOMETIMES TRUE

## 2025-06-27 SDOH — ECONOMIC STABILITY: INCOME INSECURITY: IN THE LAST 12 MONTHS, WAS THERE A TIME WHEN YOU WERE NOT ABLE TO PAY THE MORTGAGE OR RENT ON TIME?: NO

## 2025-06-27 SDOH — ECONOMIC STABILITY: TRANSPORTATION INSECURITY
IN THE PAST 12 MONTHS, HAS LACK OF TRANSPORTATION KEPT YOU FROM MEETINGS, WORK, OR FROM GETTING THINGS NEEDED FOR DAILY LIVING?: NO

## 2025-06-27 SDOH — ECONOMIC STABILITY: TRANSPORTATION INSECURITY
IN THE PAST 12 MONTHS, HAS THE LACK OF TRANSPORTATION KEPT YOU FROM MEDICAL APPOINTMENTS OR FROM GETTING MEDICATIONS?: NO

## 2025-06-27 ASSESSMENT — PATIENT HEALTH QUESTIONNAIRE - PHQ9
2. FEELING DOWN, DEPRESSED OR HOPELESS: NOT AT ALL
SUM OF ALL RESPONSES TO PHQ QUESTIONS 1-9: 1
1. LITTLE INTEREST OR PLEASURE IN DOING THINGS: SEVERAL DAYS
1. LITTLE INTEREST OR PLEASURE IN DOING THINGS: SEVERAL DAYS
SUM OF ALL RESPONSES TO PHQ QUESTIONS 1-9: 1
SUM OF ALL RESPONSES TO PHQ9 QUESTIONS 1 & 2: 1
SUM OF ALL RESPONSES TO PHQ QUESTIONS 1-9: 1
SUM OF ALL RESPONSES TO PHQ QUESTIONS 1-9: 1
2. FEELING DOWN, DEPRESSED OR HOPELESS: NOT AT ALL

## 2025-06-30 ENCOUNTER — OFFICE VISIT (OUTPATIENT)
Dept: PRIMARY CARE CLINIC | Age: 45
End: 2025-06-30
Payer: COMMERCIAL

## 2025-06-30 VITALS
OXYGEN SATURATION: 99 % | TEMPERATURE: 98.1 F | WEIGHT: 231.6 LBS | RESPIRATION RATE: 16 BRPM | HEART RATE: 92 BPM | SYSTOLIC BLOOD PRESSURE: 116 MMHG | BODY MASS INDEX: 36.27 KG/M2 | DIASTOLIC BLOOD PRESSURE: 74 MMHG

## 2025-06-30 DIAGNOSIS — M79.622 PAIN IN LEFT UPPER ARM: Primary | ICD-10-CM

## 2025-06-30 DIAGNOSIS — M79.645 THUMB PAIN, LEFT: ICD-10-CM

## 2025-06-30 PROCEDURE — 1036F TOBACCO NON-USER: CPT | Performed by: NURSE PRACTITIONER

## 2025-06-30 PROCEDURE — G8427 DOCREV CUR MEDS BY ELIG CLIN: HCPCS | Performed by: NURSE PRACTITIONER

## 2025-06-30 PROCEDURE — 99214 OFFICE O/P EST MOD 30 MIN: CPT | Performed by: NURSE PRACTITIONER

## 2025-06-30 PROCEDURE — G8417 CALC BMI ABV UP PARAM F/U: HCPCS | Performed by: NURSE PRACTITIONER

## 2025-06-30 ASSESSMENT — ENCOUNTER SYMPTOMS
GASTROINTESTINAL NEGATIVE: 1
EYES NEGATIVE: 1
RESPIRATORY NEGATIVE: 1
ALLERGIC/IMMUNOLOGIC NEGATIVE: 1

## 2025-06-30 NOTE — PATIENT INSTRUCTIONS
SURVEY:     You may be receiving a survey from [facility] regarding your visit today.     Please complete the survey to enable us to provide the highest quality of care to you and your family.     If you cannot score us a very good on any question, please call the office to discuss how we could have made your experience a very good one.     Thank you,    London Patel, APRN-CNP  Katie Lange, APRN-CNP  Helen, LPN  Alma, CMA  Subhash, CMA  [place], CMA  Blessing, PCA  Lexie, CMA  Sharonda, PM  [facility] Food Resources*  (Call [facility] for more resources)    [facility] [employer] [facility]  What they offer: Food pantry second and fourth Tuesday 4:30-6pm  Phone Number and Address: [phone] Toll Free: [employer], between [employer] and [employer]; [address] Office on Aging of [facility]  What they offer: “Meals & Nutrition” search option on website  Phone Number and Website: 373.729.2032; https://[employer]/  [facility] [employer]  What they offer: Dinner is open to all (must be registered and in good standing) and three hot meals a day for shelter residents. Breakfast 7-8am, Lunch 12-1pm, and Dinner 5-6pm daily.  Phone and Address: [phone]; [address] [facility] [address]  [employer] Delivery program  What they offer: Food Box Delivery for those within [facility] who are homebound or without transportation. Applications done by phone. Qualifying individuals are eligible for 3 deliveries for the lifetime of the program.  Phone number: Call for eligibility check at 2-1-1 or 5-091-022-GEBL (8222)  [facility]  What they offer: Food Pantry second and fourth Saturday 1-5pm  Phone and Address: [phone]; [address] [facility] [address]  [employer] Pant[employer]   What they offer: Mobile pantries throughout the [facility]. Anyone in need may visit

## 2025-06-30 NOTE — PROGRESS NOTES
[facility]  JACQUELINE WILKINSON, SPENCER  [facility]  [address] [facility] [facility] [address]  Dept: [phone]  Dept Fax: [phone]      Name: Leatha Gutierrez  : 1980         Chief Complaint:     Chief Complaint   Patient presents with    Multiple Sclerosis     6 month check.     Mass     Patient was in the ED for an inguinal abscess on . Patient finished antibiotic but would like the area looked at.     Finger Pain     Patient is still having issues with left thumb pain and it is now radiating down her arm.        History of Present Illness:      Leatha Gutierrez is a 45 y.o.  female who presents with Multiple Sclerosis (6 month check. ), Mass (Patient was in the ED for an inguinal abscess on . Patient finished antibiotic but would like the area looked at. ), and Finger Pain (Patient is still having issues with left thumb pain and it is now radiating down her arm. )      GRACIELA Zhang is here today for a routine office visit. She was seen in the ED for an inguinal abscess on 25 and placed on Doxycycline.  Inflammation has resolved and no longer draining.  She states that it was the size of an egg at home and \"popped\" it at home with copious amounts of serosanguinous fluid.      Having left lower arm pain and elbow pain.  Had shotting pain in her left elbow the other day after just resting it on the arm rest of the car.  This pain lasted for 30-45 minutes before resolving on its own.  She has been having left thumb and hand pain.  The thumb hurts more with movement.  Has been having some pain in the palm of her hand that increases more with activity.  The thumb and hand has been ongoing and did complete OT with no improvement.  No relief with the Gabapentin.  She has been taking Ibuprofen as needed that will help some with the pain.  Has been unable to get a hold of Neurologist office and has left multiple phone messages.      Past Medical History:     Past

## 2025-07-09 ENCOUNTER — TELEPHONE (OUTPATIENT)
Dept: NEUROLOGY | Age: 45
End: 2025-07-09

## 2025-07-09 NOTE — TELEPHONE ENCOUNTER
Received renewal orders for Ocrevus from [facility]. Will have Dr. Hughes review when he is back in the office tomorrow.

## 2025-07-10 NOTE — TELEPHONE ENCOUNTER
Dr. Andrade wants to see if patient had had any recent infections. If no recent infections, okay to continue Ocrevus and Dr. Andrade will sign her orders.     Patient states she is not planning on following with [facility] neurology and wants to stay with Dr. Andrade. Patient has not had any recent infections and is feeling fine as well.

## 2025-08-25 ENCOUNTER — TELEPHONE (OUTPATIENT)
Dept: NEUROLOGY | Age: 45
End: 2025-08-25